# Patient Record
Sex: MALE | Race: WHITE | NOT HISPANIC OR LATINO | Employment: OTHER | URBAN - METROPOLITAN AREA
[De-identification: names, ages, dates, MRNs, and addresses within clinical notes are randomized per-mention and may not be internally consistent; named-entity substitution may affect disease eponyms.]

---

## 2017-12-02 ENCOUNTER — HISTORICAL (OUTPATIENT)
Dept: ADMINISTRATIVE | Facility: HOSPITAL | Age: 72
End: 2017-12-02

## 2017-12-13 ENCOUNTER — HISTORICAL (OUTPATIENT)
Dept: ADMINISTRATIVE | Facility: HOSPITAL | Age: 72
End: 2017-12-13

## 2018-03-21 ENCOUNTER — HISTORICAL (OUTPATIENT)
Dept: ADMINISTRATIVE | Facility: HOSPITAL | Age: 73
End: 2018-03-21

## 2018-04-28 ENCOUNTER — HISTORICAL (OUTPATIENT)
Dept: ADMINISTRATIVE | Facility: HOSPITAL | Age: 73
End: 2018-04-28

## 2021-01-01 ENCOUNTER — HISTORICAL (OUTPATIENT)
Dept: ADMINISTRATIVE | Facility: HOSPITAL | Age: 76
End: 2021-01-01

## 2021-10-25 ENCOUNTER — HISTORICAL (OUTPATIENT)
Dept: ADMINISTRATIVE | Facility: HOSPITAL | Age: 76
End: 2021-10-25

## 2022-01-01 ENCOUNTER — OFFICE VISIT (OUTPATIENT)
Dept: NEUROLOGY | Facility: CLINIC | Age: 77
End: 2022-01-01
Payer: MEDICARE

## 2022-01-01 ENCOUNTER — ANESTHESIA EVENT (OUTPATIENT)
Dept: ENDOSCOPY | Facility: HOSPITAL | Age: 77
DRG: 551 | End: 2022-01-01
Payer: MEDICARE

## 2022-01-01 ENCOUNTER — TELEPHONE (OUTPATIENT)
Dept: NEUROLOGY | Facility: CLINIC | Age: 77
End: 2022-01-01
Payer: MEDICARE

## 2022-01-01 ENCOUNTER — ANESTHESIA (OUTPATIENT)
Dept: ENDOSCOPY | Facility: HOSPITAL | Age: 77
DRG: 551 | End: 2022-01-01
Payer: MEDICARE

## 2022-01-01 ENCOUNTER — HOSPITAL ENCOUNTER (INPATIENT)
Facility: HOSPITAL | Age: 77
LOS: 11 days | DRG: 551 | End: 2022-11-18
Attending: EMERGENCY MEDICINE | Admitting: INTERNAL MEDICINE
Payer: MEDICARE

## 2022-01-01 ENCOUNTER — EXTERNAL HOME HEALTH (OUTPATIENT)
Dept: HOME HEALTH SERVICES | Facility: HOSPITAL | Age: 77
End: 2022-01-01
Payer: MEDICARE

## 2022-01-01 VITALS
HEIGHT: 65 IN | SYSTOLIC BLOOD PRESSURE: 168 MMHG | DIASTOLIC BLOOD PRESSURE: 87 MMHG | BODY MASS INDEX: 22.99 KG/M2 | WEIGHT: 138 LBS | HEART RATE: 66 BPM

## 2022-01-01 VITALS
WEIGHT: 150 LBS | OXYGEN SATURATION: 76 % | SYSTOLIC BLOOD PRESSURE: 129 MMHG | HEIGHT: 65 IN | BODY MASS INDEX: 24.99 KG/M2 | RESPIRATION RATE: 33 BRPM | TEMPERATURE: 99 F | DIASTOLIC BLOOD PRESSURE: 58 MMHG

## 2022-01-01 VITALS
HEIGHT: 66 IN | DIASTOLIC BLOOD PRESSURE: 80 MMHG | SYSTOLIC BLOOD PRESSURE: 140 MMHG | WEIGHT: 142 LBS | BODY MASS INDEX: 22.82 KG/M2

## 2022-01-01 VITALS
WEIGHT: 142 LBS | DIASTOLIC BLOOD PRESSURE: 82 MMHG | BODY MASS INDEX: 22.82 KG/M2 | SYSTOLIC BLOOD PRESSURE: 140 MMHG | HEIGHT: 66 IN

## 2022-01-01 DIAGNOSIS — R42 DIZZINESS: ICD-10-CM

## 2022-01-01 DIAGNOSIS — R29.818 ROMBERG'S TEST POSITIVE: ICD-10-CM

## 2022-01-01 DIAGNOSIS — I82.409 DVT (DEEP VENOUS THROMBOSIS): ICD-10-CM

## 2022-01-01 DIAGNOSIS — T17.908A ASPIRATION INTO AIRWAY, INITIAL ENCOUNTER: ICD-10-CM

## 2022-01-01 DIAGNOSIS — R53.1 GENERALIZED WEAKNESS: ICD-10-CM

## 2022-01-01 DIAGNOSIS — R53.81 PHYSICAL DECONDITIONING: ICD-10-CM

## 2022-01-01 DIAGNOSIS — R63.4 UNINTENTIONAL WEIGHT LOSS: ICD-10-CM

## 2022-01-01 DIAGNOSIS — R29.6 REPEATED FALLS: ICD-10-CM

## 2022-01-01 DIAGNOSIS — G40.019 INTRACTABLE LOCALIZATION-RELATED EPILEPSY: Primary | ICD-10-CM

## 2022-01-01 DIAGNOSIS — M62.81 MUSCLE WEAKNESS: ICD-10-CM

## 2022-01-01 DIAGNOSIS — R29.818 POSITIVE ROMBERG TEST: ICD-10-CM

## 2022-01-01 DIAGNOSIS — R29.6 FALLS FREQUENTLY: ICD-10-CM

## 2022-01-01 DIAGNOSIS — G40.019 INTRACTABLE LOCALIZATION-RELATED EPILEPSY: Primary | Chronic | ICD-10-CM

## 2022-01-01 DIAGNOSIS — R11.10 VOMITING, UNSPECIFIED VOMITING TYPE, UNSPECIFIED WHETHER NAUSEA PRESENT: ICD-10-CM

## 2022-01-01 DIAGNOSIS — R78.81 BACTEREMIA: ICD-10-CM

## 2022-01-01 DIAGNOSIS — R26.89 IMBALANCE: ICD-10-CM

## 2022-01-01 DIAGNOSIS — H53.2 VERTICAL DIPLOPIA: ICD-10-CM

## 2022-01-01 DIAGNOSIS — R56.9 SEIZURE: Primary | ICD-10-CM

## 2022-01-01 DIAGNOSIS — R47.01 APHASIA: Primary | ICD-10-CM

## 2022-01-01 DIAGNOSIS — Z51.81 THERAPEUTIC DRUG MONITORING: ICD-10-CM

## 2022-01-01 DIAGNOSIS — G40.109 LOCALIZATION-RELATED EPILEPSY: Primary | ICD-10-CM

## 2022-01-01 DIAGNOSIS — S12.300A: ICD-10-CM

## 2022-01-01 DIAGNOSIS — E87.6 HYPOKALEMIA: ICD-10-CM

## 2022-01-01 DIAGNOSIS — N17.9 AKI (ACUTE KIDNEY INJURY): Primary | ICD-10-CM

## 2022-01-01 DIAGNOSIS — R79.89 ELEVATED TROPONIN: ICD-10-CM

## 2022-01-01 DIAGNOSIS — R29.6 FREQUENT FALLS: ICD-10-CM

## 2022-01-01 LAB
ABS NEUT (OLG): 17.58 X10(3)/MCL (ref 2.1–9.2)
ABS NEUT (OLG): 18.81 X10(3)/MCL (ref 2.1–9.2)
ABS NEUT (OLG): 19.14 X10(3)/MCL (ref 2.1–9.2)
ABS NEUT (OLG): 23 X10(3)/MCL (ref 2.1–9.2)
ALBUMIN SERPL-MCNC: 1.2 GM/DL (ref 3.4–4.8)
ALBUMIN SERPL-MCNC: 1.4 GM/DL (ref 3.4–4.8)
ALBUMIN SERPL-MCNC: 1.6 GM/DL (ref 3.4–4.8)
ALBUMIN SERPL-MCNC: 1.8 GM/DL (ref 3.4–4.8)
ALBUMIN SERPL-MCNC: 1.9 GM/DL (ref 3.4–4.8)
ALBUMIN SERPL-MCNC: 2.4 GM/DL (ref 3.4–4.8)
ALBUMIN SERPL-MCNC: 2.7 GM/DL (ref 3.4–4.8)
ALBUMIN SERPL-MCNC: 2.8 GM/DL (ref 3.4–4.8)
ALBUMIN SERPL-MCNC: 2.8 GM/DL (ref 3.4–4.8)
ALBUMIN/GLOB SERPL: 0.3 RATIO (ref 1.1–2)
ALBUMIN/GLOB SERPL: 0.4 RATIO (ref 1.1–2)
ALBUMIN/GLOB SERPL: 0.5 RATIO (ref 1.1–2)
ALBUMIN/GLOB SERPL: 0.6 RATIO (ref 1.1–2)
ALBUMIN/GLOB SERPL: 0.6 RATIO (ref 1.1–2)
ALBUMIN/GLOB SERPL: 0.7 RATIO (ref 1.1–2)
ALBUMIN/GLOB SERPL: 0.9 RATIO (ref 1.1–2)
ALBUMIN/GLOB SERPL: 1 RATIO (ref 1.1–2)
ALBUMIN/GLOB SERPL: 1 RATIO (ref 1.1–2)
ALP SERPL-CCNC: 101 UNIT/L (ref 40–150)
ALP SERPL-CCNC: 65 UNIT/L (ref 40–150)
ALP SERPL-CCNC: 67 UNIT/L (ref 40–150)
ALP SERPL-CCNC: 67 UNIT/L (ref 40–150)
ALP SERPL-CCNC: 80 UNIT/L (ref 40–150)
ALP SERPL-CCNC: 88 UNIT/L (ref 40–150)
ALP SERPL-CCNC: 91 UNIT/L (ref 40–150)
ALP SERPL-CCNC: 94 UNIT/L (ref 40–150)
ALP SERPL-CCNC: 99 UNIT/L (ref 40–150)
ALT SERPL-CCNC: 10 UNIT/L (ref 0–55)
ALT SERPL-CCNC: 14 UNIT/L (ref 0–55)
ALT SERPL-CCNC: 15 UNIT/L (ref 0–55)
ALT SERPL-CCNC: 15 UNIT/L (ref 0–55)
ALT SERPL-CCNC: 6 UNIT/L (ref 0–55)
ALT SERPL-CCNC: 9 UNIT/L (ref 0–55)
ALT SERPL-CCNC: <5 UNIT/L (ref 0–55)
ANA SER QL HEP2 SUBST: NORMAL
ANION GAP SERPL CALC-SCNC: 11 MEQ/L
ANION GAP SERPL CALC-SCNC: 16 MEQ/L
ANISOCYTOSIS BLD QL SMEAR: ABNORMAL
APPEARANCE UR: CLEAR
AST SERPL-CCNC: 131 UNIT/L (ref 5–34)
AST SERPL-CCNC: 23 UNIT/L (ref 5–34)
AST SERPL-CCNC: 23 UNIT/L (ref 5–34)
AST SERPL-CCNC: 28 UNIT/L (ref 5–34)
AST SERPL-CCNC: 30 UNIT/L (ref 5–34)
AST SERPL-CCNC: 74 UNIT/L (ref 5–34)
AST SERPL-CCNC: 76 UNIT/L (ref 5–34)
AST SERPL-CCNC: 85 UNIT/L (ref 5–34)
AST SERPL-CCNC: 86 UNIT/L (ref 5–34)
AV INDEX (PROSTH): 0.67
AV PEAK GRADIENT: 6 MMHG
AV VELOCITY RATIO: 0.58
B PARAP IS1001 DNA CT SPEC QN NAA+PROBE: NOT DETECTED
B PERT+PARAP PTXS1 CT SPEC QN NAA+PROBE: NOT DETECTED
BACTERIA #/AREA URNS AUTO: NORMAL /HPF
BACTERIA BLD CULT: ABNORMAL
BASOPHILS # BLD AUTO: 0.05 X10(3)/MCL (ref 0–0.2)
BASOPHILS # BLD AUTO: 0.06 X10(3)/MCL (ref 0–0.2)
BASOPHILS # BLD AUTO: 0.06 X10(3)/MCL (ref 0–0.2)
BASOPHILS # BLD AUTO: 0.07 X10(3)/MCL (ref 0–0.2)
BASOPHILS # BLD AUTO: 0.09 X10(3)/MCL (ref 0–0.2)
BASOPHILS NFR BLD AUTO: 0.5 %
BASOPHILS NFR BLD AUTO: 0.5 %
BASOPHILS NFR BLD AUTO: 0.7 %
BASOPHILS NFR BLD AUTO: 0.7 %
BASOPHILS NFR BLD AUTO: 0.8 %
BILIRUB UR QL STRIP.AUTO: NEGATIVE MG/DL
BILIRUBIN DIRECT+TOT PNL SERPL-MCNC: 0.8 MG/DL
BILIRUBIN DIRECT+TOT PNL SERPL-MCNC: 0.9 MG/DL
BILIRUBIN DIRECT+TOT PNL SERPL-MCNC: 1 MG/DL
BILIRUBIN DIRECT+TOT PNL SERPL-MCNC: 1.1 MG/DL
BILIRUBIN DIRECT+TOT PNL SERPL-MCNC: 1.3 MG/DL
BSA FOR ECHO PROCEDURE: 1.77 M2
BUN SERPL-MCNC: 11.3 MG/DL (ref 8.4–25.7)
BUN SERPL-MCNC: 21.9 MG/DL (ref 8.4–25.7)
BUN SERPL-MCNC: 23.9 MG/DL (ref 8.4–25.7)
BUN SERPL-MCNC: 26.5 MG/DL (ref 8.4–25.7)
BUN SERPL-MCNC: 62.7 MG/DL (ref 8.4–25.7)
BUN SERPL-MCNC: 68 MG/DL (ref 8.4–25.7)
BUN SERPL-MCNC: 75.7 MG/DL (ref 8.4–25.7)
BUN SERPL-MCNC: 83.1 MG/DL (ref 8.4–25.7)
BUN SERPL-MCNC: 87.1 MG/DL (ref 8.4–25.7)
BUN SERPL-MCNC: 94.3 MG/DL (ref 8.4–25.7)
BUN SERPL-MCNC: 96 MG/DL (ref 8.4–25.7)
BURR CELLS (OLG): ABNORMAL
C-ANCA TITR SER IF: NEGATIVE {TITER}
C3 SERPL-MCNC: 94 MG/DL (ref 80–173)
C4 SERPL-MCNC: 21 MG/DL (ref 13–46)
CALCIUM SERPL-MCNC: 5.9 MG/DL (ref 8.8–10)
CALCIUM SERPL-MCNC: 6.3 MG/DL (ref 8.8–10)
CALCIUM SERPL-MCNC: 6.4 MG/DL (ref 8.8–10)
CALCIUM SERPL-MCNC: 6.5 MG/DL (ref 8.8–10)
CALCIUM SERPL-MCNC: 6.6 MG/DL (ref 8.8–10)
CALCIUM SERPL-MCNC: 7.3 MG/DL (ref 8.8–10)
CALCIUM SERPL-MCNC: 7.7 MG/DL (ref 8.8–10)
CALCIUM SERPL-MCNC: 8.6 MG/DL (ref 8.8–10)
CHLAMYDIA SP IGG+IGM PNL TITR SER IF: NOT DETECTED
CHLORIDE SERPL-SCNC: 100 MMOL/L (ref 98–107)
CHLORIDE SERPL-SCNC: 101 MMOL/L (ref 98–107)
CHLORIDE SERPL-SCNC: 101 MMOL/L (ref 98–107)
CHLORIDE SERPL-SCNC: 103 MMOL/L (ref 98–107)
CHLORIDE SERPL-SCNC: 104 MMOL/L (ref 98–107)
CHLORIDE SERPL-SCNC: 104 MMOL/L (ref 98–107)
CHLORIDE SERPL-SCNC: 105 MMOL/L (ref 98–107)
CHLORIDE SERPL-SCNC: 96 MMOL/L (ref 98–107)
CHLORIDE SERPL-SCNC: 96 MMOL/L (ref 98–107)
CHLORIDE SERPL-SCNC: 98 MMOL/L (ref 98–107)
CHLORIDE SERPL-SCNC: 99 MMOL/L (ref 98–107)
CO2 SERPL-SCNC: 14 MMOL/L (ref 23–31)
CO2 SERPL-SCNC: 16 MMOL/L (ref 23–31)
CO2 SERPL-SCNC: 20 MMOL/L (ref 23–31)
CO2 SERPL-SCNC: 21 MMOL/L (ref 23–31)
CO2 SERPL-SCNC: 23 MMOL/L (ref 23–31)
CO2 SERPL-SCNC: 24 MMOL/L (ref 23–31)
CO2 SERPL-SCNC: 25 MMOL/L (ref 23–31)
CO2 SERPL-SCNC: 27 MMOL/L (ref 23–31)
CO2 SERPL-SCNC: 30 MMOL/L (ref 23–31)
COLOR UR AUTO: YELLOW
CORRECTED TEMPERATURE (PCO2): 37 MMHG (ref 35–45)
CORRECTED TEMPERATURE (PCO2): 54 MMHG (ref 19–50)
CORRECTED TEMPERATURE (PH): 7.25 (ref 7.29–7.61)
CORRECTED TEMPERATURE (PH): 7.32 (ref 7.35–7.45)
CORRECTED TEMPERATURE (PO2): 82 MMHG (ref 80–100)
CORRECTED TEMPERATURE (PO2): 98 MMHG (ref 80–100)
CREAT SERPL-MCNC: 1.32 MG/DL (ref 0.73–1.18)
CREAT SERPL-MCNC: 1.93 MG/DL (ref 0.73–1.18)
CREAT SERPL-MCNC: 2.24 MG/DL (ref 0.73–1.18)
CREAT SERPL-MCNC: 2.71 MG/DL (ref 0.73–1.18)
CREAT SERPL-MCNC: 3.49 MG/DL (ref 0.73–1.18)
CREAT SERPL-MCNC: 4.23 MG/DL (ref 0.73–1.18)
CREAT SERPL-MCNC: 4.26 MG/DL (ref 0.73–1.18)
CREAT SERPL-MCNC: 4.6 MG/DL (ref 0.73–1.18)
CREAT SERPL-MCNC: 5.06 MG/DL (ref 0.73–1.18)
CREAT SERPL-MCNC: 5.21 MG/DL (ref 0.73–1.18)
CREAT SERPL-MCNC: 5.22 MG/DL (ref 0.73–1.18)
CREAT UR-MCNC: 40 MG/DL (ref 63–166)
CREAT/UREA NIT SERPL: 18
CREAT/UREA NIT SERPL: 18
CV ECHO LV RWT: 0.67 CM
DOP CALC AO PEAK VEL: 1.2 M/S
DOP CALC AO VTI: 27.9 CM
DOP CALC LVOT PEAK VEL: 0.7 M/S
DOP CALCLVOT PEAK VEL VTI: 18.7 CM
E/A RATIO: 0.83
ECHO LV POSTERIOR WALL: 1.2 CM (ref 0.6–1.1)
EJECTION FRACTION: 55 %
ELIA CELIKEY IGA (TTG IGA): NEGATIVE
EOSINOPHIL # BLD AUTO: 0.01 X10(3)/MCL (ref 0–0.9)
EOSINOPHIL # BLD AUTO: 0.06 X10(3)/MCL (ref 0–0.9)
EOSINOPHIL # BLD AUTO: 0.13 X10(3)/MCL (ref 0–0.9)
EOSINOPHIL # BLD AUTO: 0.19 X10(3)/MCL (ref 0–0.9)
EOSINOPHIL # BLD AUTO: 0.24 X10(3)/MCL (ref 0–0.9)
EOSINOPHIL NFR BLD AUTO: 0.1 %
EOSINOPHIL NFR BLD AUTO: 0.4 %
EOSINOPHIL NFR BLD AUTO: 1.4 %
EOSINOPHIL NFR BLD AUTO: 2.1 %
EOSINOPHIL NFR BLD AUTO: 2.9 %
ERYTHROCYTE [DISTWIDTH] IN BLOOD BY AUTOMATED COUNT: 12.6 % (ref 11.5–17)
ERYTHROCYTE [DISTWIDTH] IN BLOOD BY AUTOMATED COUNT: 13 % (ref 11.5–17)
ERYTHROCYTE [DISTWIDTH] IN BLOOD BY AUTOMATED COUNT: 13.2 % (ref 11.5–17)
ERYTHROCYTE [DISTWIDTH] IN BLOOD BY AUTOMATED COUNT: 13.2 % (ref 11.5–17)
ERYTHROCYTE [DISTWIDTH] IN BLOOD BY AUTOMATED COUNT: 13.4 % (ref 11.5–17)
ERYTHROCYTE [DISTWIDTH] IN BLOOD BY AUTOMATED COUNT: 13.5 % (ref 11.5–17)
ERYTHROCYTE [DISTWIDTH] IN BLOOD BY AUTOMATED COUNT: 13.5 % (ref 11.5–17)
ERYTHROCYTE [DISTWIDTH] IN BLOOD BY AUTOMATED COUNT: 14 % (ref 11.5–17)
ERYTHROCYTE [DISTWIDTH] IN BLOOD BY AUTOMATED COUNT: 14.6 % (ref 11.5–17)
ESTROGEN SERPL-MCNC: NORMAL PG/ML
FLUAV AG UPPER RESP QL IA.RAPID: NOT DETECTED
FLUAV AG UPPER RESP QL IA.RAPID: NOT DETECTED
FLUAV H1 2009 RNA SPEC NAA+PROBE-IMP: NORMAL
FLUAV H3 HA GENE NPH QL NAA+PROBE: NORMAL
FLUBV AG UPPER RESP QL IA.RAPID: NOT DETECTED
FLUBV AG UPPER RESP QL IA.RAPID: NOT DETECTED
FRACTIONAL SHORTENING: 67 % (ref 28–44)
GFR SERPLBLD CREATININE-BSD FMLA CKD-EPI: 11 MLS/MIN/1.73/M2
GFR SERPLBLD CREATININE-BSD FMLA CKD-EPI: 12 MLS/MIN/1.73/M2
GFR SERPLBLD CREATININE-BSD FMLA CKD-EPI: 14 MLS/MIN/1.73/M2
GFR SERPLBLD CREATININE-BSD FMLA CKD-EPI: 14 MLS/MIN/1.73/M2
GFR SERPLBLD CREATININE-BSD FMLA CKD-EPI: 17 MLS/MIN/1.73/M2
GFR SERPLBLD CREATININE-BSD FMLA CKD-EPI: 24 MLS/MIN/1.73/M2
GFR SERPLBLD CREATININE-BSD FMLA CKD-EPI: 30 MLS/MIN/1.73/M2
GFR SERPLBLD CREATININE-BSD FMLA CKD-EPI: 35 MLS/MIN/1.73/M2
GFR SERPLBLD CREATININE-BSD FMLA CKD-EPI: 56 MLS/MIN/1.73/M2
GIANT PLATELETS: ABNORMAL
GLOBULIN SER-MCNC: 2.7 GM/DL (ref 2.4–3.5)
GLOBULIN SER-MCNC: 2.7 GM/DL (ref 2.4–3.5)
GLOBULIN SER-MCNC: 3.1 GM/DL (ref 2.4–3.5)
GLOBULIN SER-MCNC: 3.1 GM/DL (ref 2.4–3.5)
GLOBULIN SER-MCNC: 3.2 GM/DL (ref 2.4–3.5)
GLOBULIN SER-MCNC: 3.3 GM/DL (ref 2.4–3.5)
GLOBULIN SER-MCNC: 3.4 GM/DL (ref 2.4–3.5)
GLOBULIN SER-MCNC: 3.6 GM/DL (ref 2.4–3.5)
GLOBULIN SER-MCNC: 3.8 GM/DL (ref 2.4–3.5)
GLUCOSE SERPL-MCNC: 110 MG/DL (ref 82–115)
GLUCOSE SERPL-MCNC: 111 MG/DL (ref 82–115)
GLUCOSE SERPL-MCNC: 120 MG/DL (ref 82–115)
GLUCOSE SERPL-MCNC: 157 MG/DL (ref 82–115)
GLUCOSE SERPL-MCNC: 180 MG/DL (ref 82–115)
GLUCOSE SERPL-MCNC: 202 MG/DL (ref 82–115)
GLUCOSE SERPL-MCNC: 211 MG/DL (ref 82–115)
GLUCOSE SERPL-MCNC: 253 MG/DL (ref 82–115)
GLUCOSE SERPL-MCNC: 439 MG/DL (ref 82–115)
GLUCOSE SERPL-MCNC: 68 MG/DL (ref 82–115)
GLUCOSE SERPL-MCNC: 90 MG/DL (ref 82–115)
GLUCOSE UR QL STRIP.AUTO: ABNORMAL MG/DL
GLUCOSE UR QL STRIP.AUTO: NEGATIVE MG/DL
GLUCOSE UR QL STRIP.AUTO: NEGATIVE MG/DL
GRAM STN SPEC: ABNORMAL
HADV DNA NPH QL NAA+NON-PROBE: NOT DETECTED
HBV CORE AB SERPL QL IA: NONREACTIVE
HBV CORE IGM SERPL QL IA: NONREACTIVE
HBV SURFACE AB SER-ACNC: 0 MIU/ML
HBV SURFACE AB SERPL IA-ACNC: NONREACTIVE M[IU]/ML
HBV SURFACE AG SERPL QL IA: NONREACTIVE
HCO3 UR-SCNC: 16.2 MMOL/L (ref 22–26)
HCO3 UR-SCNC: 27.8 MMOL/L (ref 22–26)
HCOV 229E+OC43 RNA NPH QL NAA+PROBE: NOT DETECTED
HCOV HKU1 RNA SPEC QL NAA+PROBE: NOT DETECTED
HCOV NL63 RNA SPEC QL NAA+PROBE: NOT DETECTED
HCOV OC43 RNA SPEC QL NAA+PROBE: NOT DETECTED
HCT VFR BLD AUTO: 22.8 % (ref 42–52)
HCT VFR BLD AUTO: 23.6 % (ref 42–52)
HCT VFR BLD AUTO: 25.6 % (ref 42–52)
HCT VFR BLD AUTO: 32.8 % (ref 42–52)
HCT VFR BLD AUTO: 33.4 % (ref 42–52)
HCT VFR BLD AUTO: 34.6 % (ref 42–52)
HCT VFR BLD AUTO: 34.8 % (ref 42–52)
HCT VFR BLD AUTO: 36.2 % (ref 42–52)
HCT VFR BLD AUTO: 37.4 % (ref 42–52)
HCV AB SERPL QL IA: NONREACTIVE
HGB BLD-MCNC: 10.4 GM/DL (ref 14–18)
HGB BLD-MCNC: 10.8 G/DL (ref 12–16)
HGB BLD-MCNC: 11 GM/DL (ref 14–18)
HGB BLD-MCNC: 11.2 GM/DL (ref 14–18)
HGB BLD-MCNC: 11.3 GM/DL (ref 14–18)
HGB BLD-MCNC: 11.8 GM/DL (ref 14–18)
HGB BLD-MCNC: 12.2 GM/DL (ref 14–18)
HGB BLD-MCNC: 7.4 GM/DL (ref 14–18)
HGB BLD-MCNC: 7.8 GM/DL (ref 14–18)
HGB BLD-MCNC: 7.9 G/DL (ref 12–16)
HGB BLD-MCNC: 8.4 GM/DL (ref 14–18)
HIV 1+2 AB+HIV1 P24 AG SERPL QL IA: NONREACTIVE
HMPV RNA SPEC QL NAA+PROBE: NOT DETECTED
HPIV1 F GENE NPH QL NAA+PROBE: NOT DETECTED
HPIV2 L GENE NPH QL NAA+PROBE: NOT DETECTED
HPIV3 NP GENE NPH QL NAA+PROBE: NOT DETECTED
HPIV4 P GENE NPH QL NAA+PROBE: NOT DETECTED
IMM GRANULOCYTES # BLD AUTO: 0.03 X10(3)/MCL (ref 0–0.04)
IMM GRANULOCYTES # BLD AUTO: 0.03 X10(3)/MCL (ref 0–0.04)
IMM GRANULOCYTES # BLD AUTO: 0.04 X10(3)/MCL (ref 0–0.04)
IMM GRANULOCYTES # BLD AUTO: 0.09 X10(3)/MCL (ref 0–0.04)
IMM GRANULOCYTES # BLD AUTO: 0.14 X10(3)/MCL (ref 0–0.04)
IMM GRANULOCYTES # BLD AUTO: 0.16 X10(3)/MCL (ref 0–0.04)
IMM GRANULOCYTES # BLD AUTO: 0.23 X10(3)/MCL (ref 0–0.04)
IMM GRANULOCYTES # BLD AUTO: 0.97 X10(3)/MCL (ref 0–0.04)
IMM GRANULOCYTES # BLD AUTO: 1.4 X10(3)/MCL (ref 0–0.04)
IMM GRANULOCYTES NFR BLD AUTO: 0.3 %
IMM GRANULOCYTES NFR BLD AUTO: 0.4 %
IMM GRANULOCYTES NFR BLD AUTO: 0.5 %
IMM GRANULOCYTES NFR BLD AUTO: 0.8 %
IMM GRANULOCYTES NFR BLD AUTO: 0.8 %
IMM GRANULOCYTES NFR BLD AUTO: 0.9 %
IMM GRANULOCYTES NFR BLD AUTO: 1.2 %
IMM GRANULOCYTES NFR BLD AUTO: 4.5 %
IMM GRANULOCYTES NFR BLD AUTO: 5.6 %
INSTRUMENT WBC (OLG): 18.9 X10(3)/MCL
INSTRUMENT WBC (OLG): 20.9 X10(3)/MCL
INSTRUMENT WBC (OLG): 21.5 X10(3)/MCL
INSTRUMENT WBC (OLG): 25 X10(3)/MCL
INSULIN SERPL-ACNC: NORMAL U[IU]/ML
INTERVENTRICULAR SEPTUM: 1.4 CM (ref 0.6–1.1)
KETONES UR QL STRIP.AUTO: ABNORMAL MG/DL
LAB AP CLINICAL INFORMATION: NORMAL
LAB AP GROSS DESCRIPTION: NORMAL
LAB AP REPORT FOOTNOTES: NORMAL
LACOSAMIDE SERPL-MCNC: 10.9 MCG/ML (ref 1–10)
LACTATE SERPL-SCNC: 1.9 MMOL/L (ref 0.5–2.2)
LAMOTRIGINE SERPL-MCNC: 11 MCG/ML (ref 3–15)
LEFT ATRIUM SIZE: 2.6 CM
LEFT INTERNAL DIMENSION IN SYSTOLE: 1.2 CM (ref 2.1–4)
LEFT VENTRICLE MASS INDEX: 91 G/M2
LEFT VENTRICULAR INTERNAL DIMENSION IN DIASTOLE: 3.6 CM (ref 3.5–6)
LEFT VENTRICULAR MASS: 160.07 G
LEUKOCYTE ESTERASE UR QL STRIP.AUTO: NEGATIVE UNIT/L
LYMPHOCYTES # BLD AUTO: 0.59 X10(3)/MCL (ref 0.6–4.6)
LYMPHOCYTES # BLD AUTO: 1.11 X10(3)/MCL (ref 0.6–4.6)
LYMPHOCYTES # BLD AUTO: 1.48 X10(3)/MCL (ref 0.6–4.6)
LYMPHOCYTES # BLD AUTO: 1.89 X10(3)/MCL (ref 0.6–4.6)
LYMPHOCYTES # BLD AUTO: 2.08 X10(3)/MCL (ref 0.6–4.6)
LYMPHOCYTES NFR BLD AUTO: 17.9 %
LYMPHOCYTES NFR BLD AUTO: 21.4 %
LYMPHOCYTES NFR BLD AUTO: 22.7 %
LYMPHOCYTES NFR BLD AUTO: 5.7 %
LYMPHOCYTES NFR BLD AUTO: 6.5 %
LYMPHOCYTES NFR BLD MANUAL: 0.75 X10(3)/MCL
LYMPHOCYTES NFR BLD MANUAL: 0.76 X10(3)/MCL
LYMPHOCYTES NFR BLD MANUAL: 1.25 X10(3)/MCL
LYMPHOCYTES NFR BLD MANUAL: 1.72 X10(3)/MCL
LYMPHOCYTES NFR BLD MANUAL: 3 %
LYMPHOCYTES NFR BLD MANUAL: 4 %
LYMPHOCYTES NFR BLD MANUAL: 6 %
LYMPHOCYTES NFR BLD MANUAL: 8 %
M PNEUMO IGA SER-ACNC: NOT DETECTED
MAGNESIUM SERPL-MCNC: 1.9 MG/DL (ref 1.6–2.6)
MAGNESIUM SERPL-MCNC: 2 MG/DL (ref 1.6–2.6)
MAGNESIUM SERPL-MCNC: 2 MG/DL (ref 1.6–2.6)
MAGNESIUM SERPL-MCNC: 2.1 MG/DL (ref 1.6–2.6)
MAGNESIUM SERPL-MCNC: 2.2 MG/DL (ref 1.6–2.6)
MCH RBC QN AUTO: 27.6 PG (ref 27–31)
MCH RBC QN AUTO: 27.8 PG (ref 27–31)
MCH RBC QN AUTO: 27.8 PG (ref 27–31)
MCH RBC QN AUTO: 28.1 PG (ref 27–31)
MCH RBC QN AUTO: 28.1 PG (ref 27–31)
MCH RBC QN AUTO: 28.2 PG (ref 27–31)
MCH RBC QN AUTO: 28.3 PG (ref 27–31)
MCH RBC QN AUTO: 28.4 PG (ref 27–31)
MCH RBC QN AUTO: 28.6 PG (ref 27–31)
MCHC RBC AUTO-ENTMCNC: 31.6 MG/DL (ref 33–36)
MCHC RBC AUTO-ENTMCNC: 31.7 MG/DL (ref 33–36)
MCHC RBC AUTO-ENTMCNC: 32.5 MG/DL (ref 33–36)
MCHC RBC AUTO-ENTMCNC: 32.6 MG/DL (ref 33–36)
MCHC RBC AUTO-ENTMCNC: 32.6 MG/DL (ref 33–36)
MCHC RBC AUTO-ENTMCNC: 32.7 MG/DL (ref 33–36)
MCHC RBC AUTO-ENTMCNC: 32.8 MG/DL (ref 33–36)
MCHC RBC AUTO-ENTMCNC: 33.1 MG/DL (ref 33–36)
MCHC RBC AUTO-ENTMCNC: 33.5 MG/DL (ref 33–36)
MCV RBC AUTO: 84.9 FL (ref 80–94)
MCV RBC AUTO: 85.4 FL (ref 80–94)
MCV RBC AUTO: 85.7 FL (ref 80–94)
MCV RBC AUTO: 86.2 FL (ref 80–94)
MCV RBC AUTO: 86.4 FL (ref 80–94)
MCV RBC AUTO: 86.5 FL (ref 80–94)
MCV RBC AUTO: 86.7 FL (ref 80–94)
MCV RBC AUTO: 87 FL (ref 80–94)
MCV RBC AUTO: 88.1 FL (ref 80–94)
METAMYELOCYTES NFR BLD MANUAL: 2 %
MICROCYTES BLD QL SMEAR: ABNORMAL
MONOCYTES # BLD AUTO: 0.76 X10(3)/MCL (ref 0.1–1.3)
MONOCYTES # BLD AUTO: 0.89 X10(3)/MCL (ref 0.1–1.3)
MONOCYTES # BLD AUTO: 0.9 X10(3)/MCL (ref 0.1–1.3)
MONOCYTES # BLD AUTO: 1 X10(3)/MCL (ref 0.1–1.3)
MONOCYTES # BLD AUTO: 1.21 X10(3)/MCL (ref 0.1–1.3)
MONOCYTES NFR BLD AUTO: 10.1 %
MONOCYTES NFR BLD AUTO: 10.9 %
MONOCYTES NFR BLD AUTO: 10.9 %
MONOCYTES NFR BLD AUTO: 7.1 %
MONOCYTES NFR BLD AUTO: 7.4 %
MONOCYTES NFR BLD MANUAL: 0.57 X10(3)/MCL (ref 0.1–1.3)
MONOCYTES NFR BLD MANUAL: 0.65 X10(3)/MCL (ref 0.1–1.3)
MONOCYTES NFR BLD MANUAL: 0.84 X10(3)/MCL (ref 0.1–1.3)
MONOCYTES NFR BLD MANUAL: 1.25 X10(3)/MCL (ref 0.1–1.3)
MONOCYTES NFR BLD MANUAL: 3 %
MONOCYTES NFR BLD MANUAL: 3 %
MONOCYTES NFR BLD MANUAL: 4 %
MONOCYTES NFR BLD MANUAL: 5 %
MV PEAK A VEL: 0.6 M/S
MV PEAK E VEL: 0.5 M/S
NEUTROPHILS # BLD AUTO: 14.5 X10(3)/MCL (ref 2.1–9.2)
NEUTROPHILS # BLD AUTO: 5.5 X10(3)/MCL (ref 2.1–9.2)
NEUTROPHILS # BLD AUTO: 5.8 X10(3)/MCL (ref 2.1–9.2)
NEUTROPHILS # BLD AUTO: 5.9 X10(3)/MCL (ref 2.1–9.2)
NEUTROPHILS # BLD AUTO: 8.8 X10(3)/MCL (ref 2.1–9.2)
NEUTROPHILS NFR BLD AUTO: 64 %
NEUTROPHILS NFR BLD AUTO: 65.1 %
NEUTROPHILS NFR BLD AUTO: 67.2 %
NEUTROPHILS NFR BLD AUTO: 84.7 %
NEUTROPHILS NFR BLD AUTO: 85.4 %
NEUTROPHILS NFR BLD MANUAL: 89 %
NEUTROPHILS NFR BLD MANUAL: 90 %
NEUTROPHILS NFR BLD MANUAL: 90 %
NEUTROPHILS NFR BLD MANUAL: 93 %
NITRITE UR QL STRIP.AUTO: NEGATIVE
NRBC BLD AUTO-RTO: 0 %
NRBC BLD AUTO-RTO: 0.1 %
NRBC BLD AUTO-RTO: 0.1 %
P-ANCA SER QL IF: NEGATIVE
PCO2 BLDA: 37 MMHG (ref 35–45)
PCO2 BLDA: 43 MMHG
PCO2 BLDA: 49 MMHG
PCO2 BLDA: 54 MMHG (ref 19–50)
PH SMN: 7.25 [PH] (ref 7.29–7.61)
PH SMN: 7.32 [PH] (ref 7.35–7.45)
PH SMN: 7.35 [PH]
PH SMN: 7.35 [PH]
PH UR STRIP.AUTO: 5 [PH]
PH UR STRIP.AUTO: 6.5 [PH]
PH UR STRIP.AUTO: 6.5 [PH]
PHOSPHATE SERPL-MCNC: 3 MG/DL (ref 2.3–4.7)
PHOSPHATE SERPL-MCNC: 5.4 MG/DL (ref 2.3–4.7)
PHOSPHATE SERPL-MCNC: 6.3 MG/DL (ref 2.3–4.7)
PLATELET # BLD AUTO: 253 X10(3)/MCL (ref 130–400)
PLATELET # BLD AUTO: 254 X10(3)/MCL (ref 130–400)
PLATELET # BLD AUTO: 258 X10(3)/MCL (ref 130–400)
PLATELET # BLD AUTO: 258 X10(3)/MCL (ref 130–400)
PLATELET # BLD AUTO: 259 X10(3)/MCL (ref 130–400)
PLATELET # BLD AUTO: 279 X10(3)/MCL (ref 130–400)
PLATELET # BLD AUTO: 288 X10(3)/MCL (ref 130–400)
PLATELET # BLD AUTO: 294 X10(3)/MCL (ref 130–400)
PLATELET # BLD AUTO: 312 X10(3)/MCL (ref 130–400)
PLATELET # BLD EST: ADEQUATE 10*3/UL
PLATELET # BLD EST: ADEQUATE 10*3/UL
PLATELET # BLD EST: NORMAL 10*3/UL
PLATELET # BLD EST: NORMAL 10*3/UL
PMV BLD AUTO: 8.5 FL (ref 7.4–10.4)
PMV BLD AUTO: 9 FL (ref 7.4–10.4)
PMV BLD AUTO: 9 FL (ref 7.4–10.4)
PMV BLD AUTO: 9.2 FL (ref 7.4–10.4)
PMV BLD AUTO: 9.3 FL (ref 7.4–10.4)
PMV BLD AUTO: 9.4 FL (ref 7.4–10.4)
PMV BLD AUTO: 9.4 FL (ref 7.4–10.4)
PMV BLD AUTO: 9.5 FL (ref 7.4–10.4)
PMV BLD AUTO: 9.6 FL (ref 7.4–10.4)
PO2 BLDA: 82 MMHG (ref 80–100)
PO2 BLDA: 92 MMHG
PO2 BLDA: 98 MMHG
PO2 BLDA: 98 MMHG (ref 80–100)
POC BASE DEFICIT: -10.2 MMOL/L (ref -2–2)
POC BASE DEFICIT: -2 MMOL/L
POC BASE DEFICIT: 0.8 MMOL/L
POC BASE DEFICIT: 1 MMOL/L (ref -2–2)
POC COHB: 1.7 %
POC COHB: 1.8 %
POC HCO3: 23.7 MMOL/L
POC HCO3: 27.1 MMOL/L
POC IONIZED CALCIUM: 0.77 MMOL/L (ref 0.79–1.4)
POC IONIZED CALCIUM: 0.87 MMOL/L (ref 1.1–1.2)
POC IONIZED CALCIUM: 0.89 MMOL/L (ref 1.1–1.2)
POC IONIZED CALCIUM: 0.97 MMOL/L (ref 1.1–1.2)
POC METHB: 1.2 % (ref 0.4–1.5)
POC METHB: 1.3 % (ref 0.4–1.5)
POC O2HB: 94.7 % (ref 94–97)
POC O2HB: 95.6 % (ref 94–97)
POC SATURATED O2: 95 %
POC SATURATED O2: 96.4 %
POC SATURATED O2: 97 %
POC SATURATED O2: 97 %
POC TEMPERATURE: 37 C
POC TEMPERATURE: 37 C
POC TEMPERATURE: 37 °C
POC TEMPERATURE: 37 °C
POCT GLUCOSE: 162 MG/DL (ref 70–110)
POCT GLUCOSE: 428 MG/DL (ref 70–110)
POCT GLUCOSE: 76 MG/DL (ref 70–110)
POCT GLUCOSE: 81 MG/DL (ref 70–110)
POCT GLUCOSE: <20 MG/DL (ref 70–110)
POCT GLUCOSE: <20 MG/DL (ref 70–110)
POCT GLUCOSE: >500 MG/DL (ref 70–110)
POIKILOCYTOSIS BLD QL SMEAR: ABNORMAL
POTASSIUM BLD-SCNC: 4.3 MMOL/L (ref 3.5–5)
POTASSIUM BLD-SCNC: 4.5 MMOL/L
POTASSIUM BLD-SCNC: 4.6 MMOL/L
POTASSIUM BLD-SCNC: 5.8 MMOL/L (ref 3.5–5)
POTASSIUM SERPL-SCNC: 2.8 MMOL/L (ref 3.5–5.1)
POTASSIUM SERPL-SCNC: 3.3 MMOL/L (ref 3.5–5.1)
POTASSIUM SERPL-SCNC: 3.5 MMOL/L (ref 3.5–5.1)
POTASSIUM SERPL-SCNC: 3.8 MMOL/L (ref 3.5–5.1)
POTASSIUM SERPL-SCNC: 4.5 MMOL/L (ref 3.5–5.1)
POTASSIUM SERPL-SCNC: 4.7 MMOL/L (ref 3.5–5.1)
POTASSIUM SERPL-SCNC: 4.7 MMOL/L (ref 3.5–5.1)
POTASSIUM SERPL-SCNC: 4.8 MMOL/L (ref 3.5–5.1)
POTASSIUM SERPL-SCNC: 5.1 MMOL/L (ref 3.5–5.1)
POTASSIUM SERPL-SCNC: 5.5 MMOL/L (ref 3.5–5.1)
POTASSIUM SERPL-SCNC: 5.9 MMOL/L (ref 3.5–5.1)
PROT SERPL-MCNC: 4.9 GM/DL (ref 5.8–7.6)
PROT SERPL-MCNC: 4.9 GM/DL (ref 5.8–7.6)
PROT SERPL-MCNC: 5 GM/DL (ref 5.8–7.6)
PROT SERPL-MCNC: 5 GM/DL (ref 5.8–7.6)
PROT SERPL-MCNC: 5.1 GM/DL (ref 5.8–7.6)
PROT SERPL-MCNC: 5.4 GM/DL (ref 5.8–7.6)
PROT SERPL-MCNC: 5.5 GM/DL (ref 5.8–7.6)
PROT SERPL-MCNC: 5.8 GM/DL (ref 5.8–7.6)
PROT SERPL-MCNC: 5.9 GM/DL (ref 5.8–7.6)
PROT UR QL STRIP.AUTO: ABNORMAL MG/DL
PROT UR QL STRIP.AUTO: ABNORMAL MG/DL
PROT UR QL STRIP.AUTO: NEGATIVE MG/DL
PROT UR STRIP-MCNC: 56.4 MG/DL
PSA SERPL-MCNC: 0.81 NG/ML
RBC # BLD AUTO: 2.66 X10(6)/MCL (ref 4.7–6.1)
RBC # BLD AUTO: 2.78 X10(6)/MCL (ref 4.7–6.1)
RBC # BLD AUTO: 2.96 X10(6)/MCL (ref 4.7–6.1)
RBC # BLD AUTO: 3.77 X10(6)/MCL (ref 4.7–6.1)
RBC # BLD AUTO: 3.91 X10(6)/MCL (ref 4.7–6.1)
RBC # BLD AUTO: 3.95 X10(6)/MCL (ref 4.7–6.1)
RBC # BLD AUTO: 3.99 X10(6)/MCL (ref 4.7–6.1)
RBC # BLD AUTO: 4.19 X10(6)/MCL (ref 4.7–6.1)
RBC # BLD AUTO: 4.34 X10(6)/MCL (ref 4.7–6.1)
RBC #/AREA URNS AUTO: <5 /HPF
RBC MORPH BLD: ABNORMAL
RBC UR QL AUTO: ABNORMAL UNIT/L
RBC UR QL AUTO: ABNORMAL UNIT/L
RBC UR QL AUTO: NEGATIVE UNIT/L
RHINOVIRUS RNA SPEC NAA+PROBE: NOT DETECTED
RSV A 5' UTR RNA NPH QL NAA+PROBE: NOT DETECTED
SARS-COV-2 RNA RESP QL NAA+PROBE: NOT DETECTED
SODIUM BLD-SCNC: 128 MMOL/L (ref 137–145)
SODIUM BLD-SCNC: 129 MMOL/L (ref 137–145)
SODIUM BLD-SCNC: 130 MMOL/L (ref 137–145)
SODIUM BLD-SCNC: 130 MMOL/L (ref 137–145)
SODIUM SERPL-SCNC: 131 MMOL/L (ref 136–145)
SODIUM SERPL-SCNC: 132 MMOL/L (ref 136–145)
SODIUM SERPL-SCNC: 133 MMOL/L (ref 136–145)
SODIUM SERPL-SCNC: 134 MMOL/L (ref 136–145)
SODIUM SERPL-SCNC: 135 MMOL/L (ref 136–145)
SODIUM SERPL-SCNC: 137 MMOL/L (ref 136–145)
SODIUM SERPL-SCNC: 138 MMOL/L (ref 136–145)
SODIUM SERPL-SCNC: 144 MMOL/L (ref 136–145)
SODIUM SERPL-SCNC: 144 MMOL/L (ref 136–145)
SODIUM UR-SCNC: 70 MMOL/L
SP GR UR STRIP.AUTO: 1.01 (ref 1–1.03)
SPECIMEN SOURCE: ABNORMAL
SQUAMOUS #/AREA URNS AUTO: <5 /HPF
T PALLIDUM AB SER QL: NONREACTIVE
T3RU NFR SERPL: NORMAL %
TARGETS BLD QL SMEAR: ABNORMAL
TARGETS BLD QL SMEAR: ABNORMAL
TROPONIN I SERPL-MCNC: 2.58 NG/ML (ref 0–0.04)
TROPONIN I SERPL-MCNC: 3.92 NG/ML (ref 0–0.04)
TROPONIN I SERPL-MCNC: 5.38 NG/ML (ref 0–0.04)
TROPONIN I SERPL-MCNC: 5.39 NG/ML (ref 0–0.04)
TROPONIN I SERPL-MCNC: <0.01 NG/ML (ref 0–0.04)
URINE PROTEIN/CREATININE RATIO (OHS): 1.4
UROBILINOGEN UR STRIP-ACNC: 1 MG/DL
VANCOMYCIN SERPL-MCNC: 13.9 UG/ML (ref 15–20)
WBC # SPEC AUTO: 10.3 X10(3)/MCL (ref 4.5–11.5)
WBC # SPEC AUTO: 17.1 X10(3)/MCL (ref 4.5–11.5)
WBC # SPEC AUTO: 18.9 X10(3)/MCL (ref 4.5–11.5)
WBC # SPEC AUTO: 20.9 X10(3)/MCL (ref 4.5–11.5)
WBC # SPEC AUTO: 21.5 X10(3)/MCL (ref 4.5–11.5)
WBC # SPEC AUTO: 25 X10(3)/MCL (ref 4.5–11.5)
WBC # SPEC AUTO: 8.3 X10(3)/MCL (ref 4.5–11.5)
WBC # SPEC AUTO: 8.8 X10(3)/MCL (ref 4.5–11.5)
WBC # SPEC AUTO: 9.2 X10(3)/MCL (ref 4.5–11.5)
WBC #/AREA URNS AUTO: <5 /HPF

## 2022-01-01 PROCEDURE — 1101F PR PT FALLS ASSESS DOC 0-1 FALLS W/OUT INJ PAST YR: ICD-10-PCS | Mod: CPTII,S$GLB,, | Performed by: PSYCHIATRY & NEUROLOGY

## 2022-01-01 PROCEDURE — 97530 THERAPEUTIC ACTIVITIES: CPT | Mod: CQ

## 2022-01-01 PROCEDURE — 3079F DIAST BP 80-89 MM HG: CPT | Mod: CPTII,S$GLB,, | Performed by: PSYCHIATRY & NEUROLOGY

## 2022-01-01 PROCEDURE — 99232 SBSQ HOSP IP/OBS MODERATE 35: CPT | Mod: ,,, | Performed by: NEUROLOGICAL SURGERY

## 2022-01-01 PROCEDURE — 84153 ASSAY OF PSA TOTAL: CPT | Performed by: INTERNAL MEDICINE

## 2022-01-01 PROCEDURE — 1159F MED LIST DOCD IN RCRD: CPT | Mod: CPTII,S$GLB,, | Performed by: NURSE PRACTITIONER

## 2022-01-01 PROCEDURE — 25000003 PHARM REV CODE 250

## 2022-01-01 PROCEDURE — 63600175 PHARM REV CODE 636 W HCPCS: Performed by: NURSE PRACTITIONER

## 2022-01-01 PROCEDURE — 99900035 HC TECH TIME PER 15 MIN (STAT)

## 2022-01-01 PROCEDURE — 94640 AIRWAY INHALATION TREATMENT: CPT

## 2022-01-01 PROCEDURE — 80053 COMPREHEN METABOLIC PANEL: CPT | Performed by: EMERGENCY MEDICINE

## 2022-01-01 PROCEDURE — 85025 COMPLETE CBC W/AUTO DIFF WBC: CPT | Performed by: INTERNAL MEDICINE

## 2022-01-01 PROCEDURE — 25000003 PHARM REV CODE 250: Performed by: STUDENT IN AN ORGANIZED HEALTH CARE EDUCATION/TRAINING PROGRAM

## 2022-01-01 PROCEDURE — 36600 WITHDRAWAL OF ARTERIAL BLOOD: CPT

## 2022-01-01 PROCEDURE — 1160F PR REVIEW ALL MEDS BY PRESCRIBER/CLIN PHARMACIST DOCUMENTED: ICD-10-PCS | Mod: CPTII,S$GLB,, | Performed by: NURSE PRACTITIONER

## 2022-01-01 PROCEDURE — 99233 PR SUBSEQUENT HOSPITAL CARE,LEVL III: ICD-10-PCS | Mod: GT,,, | Performed by: PSYCHIATRY & NEUROLOGY

## 2022-01-01 PROCEDURE — 11000001 HC ACUTE MED/SURG PRIVATE ROOM

## 2022-01-01 PROCEDURE — 25000003 PHARM REV CODE 250: Performed by: INTERNAL MEDICINE

## 2022-01-01 PROCEDURE — 25000003 PHARM REV CODE 250: Performed by: PSYCHIATRY & NEUROLOGY

## 2022-01-01 PROCEDURE — 25000003 PHARM REV CODE 250: Performed by: NURSE ANESTHETIST, CERTIFIED REGISTERED

## 2022-01-01 PROCEDURE — 25000242 PHARM REV CODE 250 ALT 637 W/ HCPCS: Performed by: NURSE PRACTITIONER

## 2022-01-01 PROCEDURE — 86780 TREPONEMA PALLIDUM: CPT | Performed by: NURSE PRACTITIONER

## 2022-01-01 PROCEDURE — 1159F PR MEDICATION LIST DOCUMENTED IN MEDICAL RECORD: ICD-10-PCS | Mod: CPTII,S$GLB,, | Performed by: PSYCHIATRY & NEUROLOGY

## 2022-01-01 PROCEDURE — 92611 MOTION FLUOROSCOPY/SWALLOW: CPT

## 2022-01-01 PROCEDURE — 83735 ASSAY OF MAGNESIUM: CPT | Performed by: EMERGENCY MEDICINE

## 2022-01-01 PROCEDURE — 85025 COMPLETE CBC W/AUTO DIFF WBC: CPT | Performed by: EMERGENCY MEDICINE

## 2022-01-01 PROCEDURE — 85027 COMPLETE CBC AUTOMATED: CPT | Performed by: STUDENT IN AN ORGANIZED HEALTH CARE EDUCATION/TRAINING PROGRAM

## 2022-01-01 PROCEDURE — 43239 EGD BIOPSY SINGLE/MULTIPLE: CPT

## 2022-01-01 PROCEDURE — 84484 ASSAY OF TROPONIN QUANT: CPT | Performed by: STUDENT IN AN ORGANIZED HEALTH CARE EDUCATION/TRAINING PROGRAM

## 2022-01-01 PROCEDURE — 85025 COMPLETE CBC W/AUTO DIFF WBC: CPT | Performed by: STUDENT IN AN ORGANIZED HEALTH CARE EDUCATION/TRAINING PROGRAM

## 2022-01-01 PROCEDURE — 94660 CPAP INITIATION&MGMT: CPT

## 2022-01-01 PROCEDURE — 87077 CULTURE AEROBIC IDENTIFY: CPT | Performed by: GENERAL PRACTICE

## 2022-01-01 PROCEDURE — 25000003 PHARM REV CODE 250: Performed by: NURSE PRACTITIONER

## 2022-01-01 PROCEDURE — 99223 1ST HOSP IP/OBS HIGH 75: CPT | Mod: ,,, | Performed by: PSYCHIATRY & NEUROLOGY

## 2022-01-01 PROCEDURE — 83516 IMMUNOASSAY NONANTIBODY: CPT | Performed by: INTERNAL MEDICINE

## 2022-01-01 PROCEDURE — 84100 ASSAY OF PHOSPHORUS: CPT | Performed by: INTERNAL MEDICINE

## 2022-01-01 PROCEDURE — 36415 COLL VENOUS BLD VENIPUNCTURE: CPT | Performed by: STUDENT IN AN ORGANIZED HEALTH CARE EDUCATION/TRAINING PROGRAM

## 2022-01-01 PROCEDURE — 97116 GAIT TRAINING THERAPY: CPT | Mod: CQ

## 2022-01-01 PROCEDURE — 81003 URINALYSIS AUTO W/O SCOPE: CPT | Performed by: INTERNAL MEDICINE

## 2022-01-01 PROCEDURE — A4216 STERILE WATER/SALINE, 10 ML: HCPCS | Performed by: STUDENT IN AN ORGANIZED HEALTH CARE EDUCATION/TRAINING PROGRAM

## 2022-01-01 PROCEDURE — 1159F PR MEDICATION LIST DOCUMENTED IN MEDICAL RECORD: ICD-10-PCS | Mod: CPTII,S$GLB,, | Performed by: NURSE PRACTITIONER

## 2022-01-01 PROCEDURE — 0240U COVID/FLU A&B PCR: CPT | Performed by: INTERNAL MEDICINE

## 2022-01-01 PROCEDURE — 27000221 HC OXYGEN, UP TO 24 HOURS

## 2022-01-01 PROCEDURE — 93010 EKG 12-LEAD: ICD-10-PCS | Mod: ,,, | Performed by: INTERNAL MEDICINE

## 2022-01-01 PROCEDURE — 3077F PR MOST RECENT SYSTOLIC BLOOD PRESSURE >= 140 MM HG: ICD-10-PCS | Mod: CPTII,S$GLB,, | Performed by: NURSE PRACTITIONER

## 2022-01-01 PROCEDURE — 82803 BLOOD GASES ANY COMBINATION: CPT

## 2022-01-01 PROCEDURE — 21400001 HC TELEMETRY ROOM

## 2022-01-01 PROCEDURE — 1100F PTFALLS ASSESS-DOCD GE2>/YR: CPT | Mod: CPTII,S$GLB,, | Performed by: NURSE PRACTITIONER

## 2022-01-01 PROCEDURE — 1160F RVW MEDS BY RX/DR IN RCRD: CPT | Mod: CPTII,S$GLB,, | Performed by: NURSE PRACTITIONER

## 2022-01-01 PROCEDURE — 87389 HIV-1 AG W/HIV-1&-2 AB AG IA: CPT | Performed by: NURSE PRACTITIONER

## 2022-01-01 PROCEDURE — 80235 DRUG ASSAY LACOSAMIDE: CPT | Performed by: INTERNAL MEDICINE

## 2022-01-01 PROCEDURE — 3288F FALL RISK ASSESSMENT DOCD: CPT | Mod: CPTII,S$GLB,, | Performed by: NURSE PRACTITIONER

## 2022-01-01 PROCEDURE — 1100F PR PT FALLS ASSESS DOC 2+ FALLS/FALL W/INJURY/YR: ICD-10-PCS | Mod: CPTII,S$GLB,, | Performed by: NURSE PRACTITIONER

## 2022-01-01 PROCEDURE — 83735 ASSAY OF MAGNESIUM: CPT | Performed by: NURSE PRACTITIONER

## 2022-01-01 PROCEDURE — 99233 PR SUBSEQUENT HOSPITAL CARE,LEVL III: ICD-10-PCS | Mod: ,,, | Performed by: GENERAL PRACTICE

## 2022-01-01 PROCEDURE — 99233 SBSQ HOSP IP/OBS HIGH 50: CPT | Mod: ,,, | Performed by: GENERAL PRACTICE

## 2022-01-01 PROCEDURE — 84300 ASSAY OF URINE SODIUM: CPT | Performed by: STUDENT IN AN ORGANIZED HEALTH CARE EDUCATION/TRAINING PROGRAM

## 2022-01-01 PROCEDURE — 36569 INSJ PICC 5 YR+ W/O IMAGING: CPT

## 2022-01-01 PROCEDURE — 96374 THER/PROPH/DIAG INJ IV PUSH: CPT

## 2022-01-01 PROCEDURE — 3288F PR FALLS RISK ASSESSMENT DOCUMENTED: ICD-10-PCS | Mod: CPTII,S$GLB,, | Performed by: NURSE PRACTITIONER

## 2022-01-01 PROCEDURE — 25000003 PHARM REV CODE 250: Performed by: HOSPITALIST

## 2022-01-01 PROCEDURE — 63600175 PHARM REV CODE 636 W HCPCS: Performed by: INTERNAL MEDICINE

## 2022-01-01 PROCEDURE — 93010 ELECTROCARDIOGRAM REPORT: CPT | Mod: ,,, | Performed by: INTERNAL MEDICINE

## 2022-01-01 PROCEDURE — 86036 ANCA SCREEN EACH ANTIBODY: CPT | Performed by: STUDENT IN AN ORGANIZED HEALTH CARE EDUCATION/TRAINING PROGRAM

## 2022-01-01 PROCEDURE — 27201423 OPTIME MED/SURG SUP & DEVICES STERILE SUPPLY: Performed by: INTERNAL MEDICINE

## 2022-01-01 PROCEDURE — 36415 COLL VENOUS BLD VENIPUNCTURE: CPT | Performed by: GENERAL PRACTICE

## 2022-01-01 PROCEDURE — 63600175 PHARM REV CODE 636 W HCPCS: Performed by: STUDENT IN AN ORGANIZED HEALTH CARE EDUCATION/TRAINING PROGRAM

## 2022-01-01 PROCEDURE — 36415 COLL VENOUS BLD VENIPUNCTURE: CPT | Performed by: NURSE PRACTITIONER

## 2022-01-01 PROCEDURE — 37799 UNLISTED PX VASCULAR SURGERY: CPT

## 2022-01-01 PROCEDURE — 1126F PR PAIN SEVERITY QUANTIFIED, NO PAIN PRESENT: ICD-10-PCS | Mod: CPTII,S$GLB,, | Performed by: NURSE PRACTITIONER

## 2022-01-01 PROCEDURE — 80053 COMPREHEN METABOLIC PANEL: CPT | Performed by: INTERNAL MEDICINE

## 2022-01-01 PROCEDURE — 99214 OFFICE O/P EST MOD 30 MIN: CPT | Mod: S$GLB,,, | Performed by: NURSE PRACTITIONER

## 2022-01-01 PROCEDURE — 80202 ASSAY OF VANCOMYCIN: CPT | Performed by: INTERNAL MEDICINE

## 2022-01-01 PROCEDURE — 99214 PR OFFICE/OUTPT VISIT, EST, LEVL IV, 30-39 MIN: ICD-10-PCS | Mod: S$GLB,,, | Performed by: NURSE PRACTITIONER

## 2022-01-01 PROCEDURE — 99999 PR PBB SHADOW E&M-EST. PATIENT-LVL IV: CPT | Mod: PBBFAC,,, | Performed by: NURSE PRACTITIONER

## 2022-01-01 PROCEDURE — 84484 ASSAY OF TROPONIN QUANT: CPT | Performed by: EMERGENCY MEDICINE

## 2022-01-01 PROCEDURE — 86160 COMPLEMENT ANTIGEN: CPT | Performed by: STUDENT IN AN ORGANIZED HEALTH CARE EDUCATION/TRAINING PROGRAM

## 2022-01-01 PROCEDURE — 25000242 PHARM REV CODE 250 ALT 637 W/ HCPCS: Performed by: INTERNAL MEDICINE

## 2022-01-01 PROCEDURE — 84100 ASSAY OF PHOSPHORUS: CPT | Performed by: STUDENT IN AN ORGANIZED HEALTH CARE EDUCATION/TRAINING PROGRAM

## 2022-01-01 PROCEDURE — 86704 HEP B CORE ANTIBODY TOTAL: CPT | Performed by: NURSE PRACTITIONER

## 2022-01-01 PROCEDURE — C9113 INJ PANTOPRAZOLE SODIUM, VIA: HCPCS | Performed by: STUDENT IN AN ORGANIZED HEALTH CARE EDUCATION/TRAINING PROGRAM

## 2022-01-01 PROCEDURE — 37000008 HC ANESTHESIA 1ST 15 MINUTES: Performed by: INTERNAL MEDICINE

## 2022-01-01 PROCEDURE — 99223 1ST HOSP IP/OBS HIGH 75: CPT | Mod: ,,, | Performed by: NEUROLOGICAL SURGERY

## 2022-01-01 PROCEDURE — 94761 N-INVAS EAR/PLS OXIMETRY MLT: CPT

## 2022-01-01 PROCEDURE — 63600175 PHARM REV CODE 636 W HCPCS: Performed by: NURSE ANESTHETIST, CERTIFIED REGISTERED

## 2022-01-01 PROCEDURE — 93005 ELECTROCARDIOGRAM TRACING: CPT

## 2022-01-01 PROCEDURE — 99999 PR PBB SHADOW E&M-EST. PATIENT-LVL V: CPT | Mod: PBBFAC,,, | Performed by: NURSE PRACTITIONER

## 2022-01-01 PROCEDURE — 99223 PR INITIAL HOSPITAL CARE,LEVL III: ICD-10-PCS | Mod: ,,, | Performed by: PSYCHIATRY & NEUROLOGY

## 2022-01-01 PROCEDURE — 83735 ASSAY OF MAGNESIUM: CPT | Performed by: STUDENT IN AN ORGANIZED HEALTH CARE EDUCATION/TRAINING PROGRAM

## 2022-01-01 PROCEDURE — 3077F PR MOST RECENT SYSTOLIC BLOOD PRESSURE >= 140 MM HG: ICD-10-PCS | Mod: CPTII,S$GLB,, | Performed by: PSYCHIATRY & NEUROLOGY

## 2022-01-01 PROCEDURE — 97162 PT EVAL MOD COMPLEX 30 MIN: CPT

## 2022-01-01 PROCEDURE — 3077F SYST BP >= 140 MM HG: CPT | Mod: CPTII,S$GLB,, | Performed by: NURSE PRACTITIONER

## 2022-01-01 PROCEDURE — 37000009 HC ANESTHESIA EA ADD 15 MINS: Performed by: INTERNAL MEDICINE

## 2022-01-01 PROCEDURE — 99999 PR PBB SHADOW E&M-EST. PATIENT-LVL V: ICD-10-PCS | Mod: PBBFAC,,, | Performed by: NURSE PRACTITIONER

## 2022-01-01 PROCEDURE — 99233 PR SUBSEQUENT HOSPITAL CARE,LEVL III: ICD-10-PCS | Mod: FS,,, | Performed by: GENERAL PRACTICE

## 2022-01-01 PROCEDURE — 99291 CRITICAL CARE FIRST HOUR: CPT | Mod: FS,,, | Performed by: GENERAL PRACTICE

## 2022-01-01 PROCEDURE — 99231 PR SUBSEQUENT HOSPITAL CARE,LEVL I: ICD-10-PCS | Mod: ,,, | Performed by: NEUROLOGICAL SURGERY

## 2022-01-01 PROCEDURE — C9254 INJECTION, LACOSAMIDE: HCPCS | Performed by: STUDENT IN AN ORGANIZED HEALTH CARE EDUCATION/TRAINING PROGRAM

## 2022-01-01 PROCEDURE — 80053 COMPREHEN METABOLIC PANEL: CPT | Performed by: STUDENT IN AN ORGANIZED HEALTH CARE EDUCATION/TRAINING PROGRAM

## 2022-01-01 PROCEDURE — 3077F SYST BP >= 140 MM HG: CPT | Mod: CPTII,S$GLB,, | Performed by: PSYCHIATRY & NEUROLOGY

## 2022-01-01 PROCEDURE — 86039 ANTINUCLEAR ANTIBODIES (ANA): CPT | Performed by: STUDENT IN AN ORGANIZED HEALTH CARE EDUCATION/TRAINING PROGRAM

## 2022-01-01 PROCEDURE — 99231 SBSQ HOSP IP/OBS SF/LOW 25: CPT | Mod: ,,, | Performed by: NEUROLOGICAL SURGERY

## 2022-01-01 PROCEDURE — 36415 COLL VENOUS BLD VENIPUNCTURE: CPT | Performed by: INTERNAL MEDICINE

## 2022-01-01 PROCEDURE — 81001 URINALYSIS AUTO W/SCOPE: CPT | Performed by: PSYCHIATRY & NEUROLOGY

## 2022-01-01 PROCEDURE — 36415 COLL VENOUS BLD VENIPUNCTURE: CPT

## 2022-01-01 PROCEDURE — 27000190 HC CPAP FULL FACE MASK W/VALVE

## 2022-01-01 PROCEDURE — 81001 URINALYSIS AUTO W/SCOPE: CPT | Performed by: INTERNAL MEDICINE

## 2022-01-01 PROCEDURE — 3079F PR MOST RECENT DIASTOLIC BLOOD PRESSURE 80-89 MM HG: ICD-10-PCS | Mod: CPTII,S$GLB,, | Performed by: NURSE PRACTITIONER

## 2022-01-01 PROCEDURE — 99233 SBSQ HOSP IP/OBS HIGH 50: CPT | Mod: FS,,, | Performed by: GENERAL PRACTICE

## 2022-01-01 PROCEDURE — 1126F AMNT PAIN NOTED NONE PRSNT: CPT | Mod: CPTII,S$GLB,, | Performed by: NURSE PRACTITIONER

## 2022-01-01 PROCEDURE — 43450 DILATE ESOPHAGUS 1/MULT PASS: CPT | Performed by: INTERNAL MEDICINE

## 2022-01-01 PROCEDURE — 3079F DIAST BP 80-89 MM HG: CPT | Mod: CPTII,S$GLB,, | Performed by: NURSE PRACTITIONER

## 2022-01-01 PROCEDURE — 25000242 PHARM REV CODE 250 ALT 637 W/ HCPCS: Performed by: STUDENT IN AN ORGANIZED HEALTH CARE EDUCATION/TRAINING PROGRAM

## 2022-01-01 PROCEDURE — 88305 TISSUE EXAM BY PATHOLOGIST: CPT | Performed by: INTERNAL MEDICINE

## 2022-01-01 PROCEDURE — 87040 BLOOD CULTURE FOR BACTERIA: CPT | Performed by: NURSE PRACTITIONER

## 2022-01-01 PROCEDURE — 20000000 HC ICU ROOM

## 2022-01-01 PROCEDURE — 85007 BL SMEAR W/DIFF WBC COUNT: CPT | Performed by: STUDENT IN AN ORGANIZED HEALTH CARE EDUCATION/TRAINING PROGRAM

## 2022-01-01 PROCEDURE — 86709 HEPATITIS A IGM ANTIBODY: CPT | Performed by: NURSE PRACTITIONER

## 2022-01-01 PROCEDURE — 87040 BLOOD CULTURE FOR BACTERIA: CPT | Performed by: INTERNAL MEDICINE

## 2022-01-01 PROCEDURE — 87633 RESP VIRUS 12-25 TARGETS: CPT | Performed by: INTERNAL MEDICINE

## 2022-01-01 PROCEDURE — 1126F PR PAIN SEVERITY QUANTIFIED, NO PAIN PRESENT: ICD-10-PCS | Mod: CPTII,S$GLB,, | Performed by: PSYCHIATRY & NEUROLOGY

## 2022-01-01 PROCEDURE — 99214 OFFICE O/P EST MOD 30 MIN: CPT | Mod: S$GLB,,, | Performed by: PSYCHIATRY & NEUROLOGY

## 2022-01-01 PROCEDURE — 1101F PT FALLS ASSESS-DOCD LE1/YR: CPT | Mod: CPTII,S$GLB,, | Performed by: PSYCHIATRY & NEUROLOGY

## 2022-01-01 PROCEDURE — 97166 OT EVAL MOD COMPLEX 45 MIN: CPT

## 2022-01-01 PROCEDURE — 99358 PR PROLONGED SERV,NO CONTACT,1ST HR: ICD-10-PCS | Mod: ,,, | Performed by: PSYCHIATRY & NEUROLOGY

## 2022-01-01 PROCEDURE — 3288F FALL RISK ASSESSMENT DOCD: CPT | Mod: CPTII,S$GLB,, | Performed by: PSYCHIATRY & NEUROLOGY

## 2022-01-01 PROCEDURE — 3288F PR FALLS RISK ASSESSMENT DOCUMENTED: ICD-10-PCS | Mod: CPTII,S$GLB,, | Performed by: PSYCHIATRY & NEUROLOGY

## 2022-01-01 PROCEDURE — 99358 PROLONG SERVICE W/O CONTACT: CPT | Mod: ,,, | Performed by: PSYCHIATRY & NEUROLOGY

## 2022-01-01 PROCEDURE — 99232 PR SUBSEQUENT HOSPITAL CARE,LEVL II: ICD-10-PCS | Mod: ,,, | Performed by: NEUROLOGICAL SURGERY

## 2022-01-01 PROCEDURE — 99285 EMERGENCY DEPT VISIT HI MDM: CPT | Mod: 25

## 2022-01-01 PROCEDURE — 83735 ASSAY OF MAGNESIUM: CPT | Performed by: INTERNAL MEDICINE

## 2022-01-01 PROCEDURE — 82570 ASSAY OF URINE CREATININE: CPT | Performed by: STUDENT IN AN ORGANIZED HEALTH CARE EDUCATION/TRAINING PROGRAM

## 2022-01-01 PROCEDURE — 83605 ASSAY OF LACTIC ACID: CPT | Performed by: STUDENT IN AN ORGANIZED HEALTH CARE EDUCATION/TRAINING PROGRAM

## 2022-01-01 PROCEDURE — 99999 PR PBB SHADOW E&M-EST. PATIENT-LVL III: ICD-10-PCS | Mod: PBBFAC,,, | Performed by: PSYCHIATRY & NEUROLOGY

## 2022-01-01 PROCEDURE — 99999 PR PBB SHADOW E&M-EST. PATIENT-LVL III: CPT | Mod: PBBFAC,,, | Performed by: PSYCHIATRY & NEUROLOGY

## 2022-01-01 PROCEDURE — 99291 PR CRITICAL CARE, E/M 30-74 MINUTES: ICD-10-PCS | Mod: FS,,, | Performed by: GENERAL PRACTICE

## 2022-01-01 PROCEDURE — C1751 CATH, INF, PER/CENT/MIDLINE: HCPCS

## 2022-01-01 PROCEDURE — 1159F MED LIST DOCD IN RCRD: CPT | Mod: CPTII,S$GLB,, | Performed by: PSYCHIATRY & NEUROLOGY

## 2022-01-01 PROCEDURE — 99214 PR OFFICE/OUTPT VISIT, EST, LEVL IV, 30-39 MIN: ICD-10-PCS | Mod: S$GLB,,, | Performed by: PSYCHIATRY & NEUROLOGY

## 2022-01-01 PROCEDURE — 99233 SBSQ HOSP IP/OBS HIGH 50: CPT | Mod: GT,,, | Performed by: PSYCHIATRY & NEUROLOGY

## 2022-01-01 PROCEDURE — 99999 PR PBB SHADOW E&M-EST. PATIENT-LVL IV: ICD-10-PCS | Mod: PBBFAC,,, | Performed by: NURSE PRACTITIONER

## 2022-01-01 PROCEDURE — 87340 HEPATITIS B SURFACE AG IA: CPT | Performed by: NURSE PRACTITIONER

## 2022-01-01 PROCEDURE — 80175 DRUG SCREEN QUAN LAMOTRIGINE: CPT

## 2022-01-01 PROCEDURE — 97110 THERAPEUTIC EXERCISES: CPT | Mod: CQ

## 2022-01-01 PROCEDURE — 3079F PR MOST RECENT DIASTOLIC BLOOD PRESSURE 80-89 MM HG: ICD-10-PCS | Mod: CPTII,S$GLB,, | Performed by: PSYCHIATRY & NEUROLOGY

## 2022-01-01 PROCEDURE — 84100 ASSAY OF PHOSPHORUS: CPT | Performed by: NURSE PRACTITIONER

## 2022-01-01 PROCEDURE — 99223 PR INITIAL HOSPITAL CARE,LEVL III: ICD-10-PCS | Mod: ,,, | Performed by: NEUROLOGICAL SURGERY

## 2022-01-01 PROCEDURE — 86706 HEP B SURFACE ANTIBODY: CPT | Performed by: NURSE PRACTITIONER

## 2022-01-01 PROCEDURE — 86803 HEPATITIS C AB TEST: CPT | Performed by: NURSE PRACTITIONER

## 2022-01-01 PROCEDURE — 1126F AMNT PAIN NOTED NONE PRSNT: CPT | Mod: CPTII,S$GLB,, | Performed by: PSYCHIATRY & NEUROLOGY

## 2022-01-01 RX ORDER — CLONIDINE HYDROCHLORIDE 0.1 MG/1
1 TABLET ORAL EVERY 8 HOURS PRN
COMMUNITY
Start: 2022-01-01

## 2022-01-01 RX ORDER — LIDOCAINE HYDROCHLORIDE 20 MG/ML
INJECTION, SOLUTION EPIDURAL; INFILTRATION; INTRACAUDAL; PERINEURAL
Status: COMPLETED
Start: 2022-01-01 | End: 2022-01-01

## 2022-01-01 RX ORDER — LACOSAMIDE 100 MG/1
TABLET, FILM COATED ORAL
Qty: 180 TABLET | Refills: 0 | Status: SHIPPED | OUTPATIENT
Start: 2022-01-01 | End: 2022-01-01 | Stop reason: SDUPTHER

## 2022-01-01 RX ORDER — SODIUM CHLORIDE, SODIUM GLUCONATE, SODIUM ACETATE, POTASSIUM CHLORIDE AND MAGNESIUM CHLORIDE 30; 37; 368; 526; 502 MG/100ML; MG/100ML; MG/100ML; MG/100ML; MG/100ML
INJECTION, SOLUTION INTRAVENOUS CONTINUOUS
Status: CANCELLED | OUTPATIENT
Start: 2022-01-01 | End: 2022-12-11

## 2022-01-01 RX ORDER — NIFEDIPINE 60 MG/1
60 TABLET, EXTENDED RELEASE ORAL
COMMUNITY
Start: 2022-01-01 | End: 2022-01-01

## 2022-01-01 RX ORDER — LACOSAMIDE 100 MG/1
150 TABLET ORAL DAILY
COMMUNITY
Start: 2022-01-01 | End: 2022-01-01 | Stop reason: SDUPTHER

## 2022-01-01 RX ORDER — HYDROCODONE BITARTRATE AND ACETAMINOPHEN 10; 325 MG/1; MG/1
1 TABLET ORAL EVERY 4 HOURS PRN
Status: DISCONTINUED | OUTPATIENT
Start: 2022-01-01 | End: 2022-01-01 | Stop reason: HOSPADM

## 2022-01-01 RX ORDER — PANTOPRAZOLE SODIUM 40 MG/10ML
40 INJECTION, POWDER, LYOPHILIZED, FOR SOLUTION INTRAVENOUS DAILY
Status: CANCELLED | OUTPATIENT
Start: 2022-01-01

## 2022-01-01 RX ORDER — DEXMEDETOMIDINE HYDROCHLORIDE 4 UG/ML
INJECTION, SOLUTION INTRAVENOUS
Status: COMPLETED
Start: 2022-01-01 | End: 2022-01-01

## 2022-01-01 RX ORDER — LAMOTRIGINE 200 MG/1
1.5 TABLET ORAL NIGHTLY
COMMUNITY
Start: 2022-01-01 | End: 2022-01-01 | Stop reason: SDUPTHER

## 2022-01-01 RX ORDER — LAMOTRIGINE 100 MG/1
200 TABLET ORAL 2 TIMES DAILY
Status: DISCONTINUED | OUTPATIENT
Start: 2022-01-01 | End: 2022-01-01 | Stop reason: HOSPADM

## 2022-01-01 RX ORDER — CALCIUM GLUCONATE 20 MG/ML
1 INJECTION, SOLUTION INTRAVENOUS ONCE
Status: COMPLETED | OUTPATIENT
Start: 2022-01-01 | End: 2022-01-01

## 2022-01-01 RX ORDER — NOREPINEPHRINE BITARTRATE/D5W 8 MG/250ML
PLASTIC BAG, INJECTION (ML) INTRAVENOUS
Status: COMPLETED
Start: 2022-01-01 | End: 2022-01-01

## 2022-01-01 RX ORDER — LACOSAMIDE 50 MG/1
100 TABLET ORAL EVERY 12 HOURS
Status: DISCONTINUED | OUTPATIENT
Start: 2022-01-01 | End: 2022-01-01

## 2022-01-01 RX ORDER — GLYCOPYRROLATE 0.2 MG/ML
0.1 INJECTION INTRAMUSCULAR; INTRAVENOUS ONCE
Status: COMPLETED | OUTPATIENT
Start: 2022-01-01 | End: 2022-01-01

## 2022-01-01 RX ORDER — CARVEDILOL 3.12 MG/1
3.12 TABLET ORAL 2 TIMES DAILY
Status: DISCONTINUED | OUTPATIENT
Start: 2022-01-01 | End: 2022-01-01

## 2022-01-01 RX ORDER — LAMOTRIGINE 200 MG/1
TABLET ORAL
Qty: 90 TABLET | Refills: 12 | Status: SHIPPED | OUTPATIENT
Start: 2022-01-01

## 2022-01-01 RX ORDER — CETIRIZINE HYDROCHLORIDE 10 MG/1
10 TABLET ORAL DAILY
COMMUNITY
Start: 2022-01-01 | End: 2023-04-14

## 2022-01-01 RX ORDER — MUPIROCIN 20 MG/G
OINTMENT TOPICAL 2 TIMES DAILY
Status: DISCONTINUED | OUTPATIENT
Start: 2022-01-01 | End: 2022-01-01 | Stop reason: HOSPADM

## 2022-01-01 RX ORDER — ATORVASTATIN CALCIUM 40 MG/1
40 TABLET, FILM COATED ORAL DAILY
Status: DISCONTINUED | OUTPATIENT
Start: 2022-01-01 | End: 2022-01-01 | Stop reason: HOSPADM

## 2022-01-01 RX ORDER — SODIUM CHLORIDE 0.9 % (FLUSH) 0.9 %
10 SYRINGE (ML) INJECTION
Status: DISCONTINUED | OUTPATIENT
Start: 2022-01-01 | End: 2022-01-01 | Stop reason: HOSPADM

## 2022-01-01 RX ORDER — BISACODYL 10 MG
10 SUPPOSITORY, RECTAL RECTAL EVERY 12 HOURS
Status: DISCONTINUED | OUTPATIENT
Start: 2022-01-01 | End: 2022-01-01 | Stop reason: HOSPADM

## 2022-01-01 RX ORDER — ATORVASTATIN CALCIUM 40 MG/1
1 TABLET, FILM COATED ORAL DAILY
COMMUNITY
Start: 2022-01-01

## 2022-01-01 RX ORDER — ESCITALOPRAM OXALATE 5 MG/1
5 TABLET ORAL DAILY
Status: DISCONTINUED | OUTPATIENT
Start: 2022-01-01 | End: 2022-01-01 | Stop reason: HOSPADM

## 2022-01-01 RX ORDER — TALC
6 POWDER (GRAM) TOPICAL NIGHTLY PRN
Status: CANCELLED | OUTPATIENT
Start: 2022-01-01

## 2022-01-01 RX ORDER — SODIUM CHLORIDE 9 MG/ML
INJECTION, SOLUTION INTRAVENOUS ONCE
Status: COMPLETED | OUTPATIENT
Start: 2022-01-01 | End: 2022-01-01

## 2022-01-01 RX ORDER — CARBIDOPA AND LEVODOPA 10; 100 MG/1; MG/1
1 TABLET ORAL 3 TIMES DAILY
Status: DISCONTINUED | OUTPATIENT
Start: 2022-01-01 | End: 2022-01-01

## 2022-01-01 RX ORDER — FUROSEMIDE 40 MG/1
40 TABLET ORAL DAILY
Status: DISCONTINUED | OUTPATIENT
Start: 2022-01-01 | End: 2022-01-01

## 2022-01-01 RX ORDER — PROPOFOL 10 MG/ML
VIAL (ML) INTRAVENOUS
Status: DISPENSED
Start: 2022-01-01 | End: 2022-01-01

## 2022-01-01 RX ORDER — DEXMEDETOMIDINE HYDROCHLORIDE 4 UG/ML
0-1.4 INJECTION, SOLUTION INTRAVENOUS CONTINUOUS
Status: DISCONTINUED | OUTPATIENT
Start: 2022-01-01 | End: 2022-01-01 | Stop reason: HOSPADM

## 2022-01-01 RX ORDER — IPRATROPIUM BROMIDE AND ALBUTEROL SULFATE 2.5; .5 MG/3ML; MG/3ML
3 SOLUTION RESPIRATORY (INHALATION)
Status: COMPLETED | OUTPATIENT
Start: 2022-01-01 | End: 2022-01-01

## 2022-01-01 RX ORDER — NIFEDIPINE 60 MG/1
60 TABLET, EXTENDED RELEASE ORAL DAILY
Status: DISCONTINUED | OUTPATIENT
Start: 2022-01-01 | End: 2022-01-01

## 2022-01-01 RX ORDER — PROPOFOL 10 MG/ML
VIAL (ML) INTRAVENOUS
Status: DISCONTINUED | OUTPATIENT
Start: 2022-01-01 | End: 2022-01-01

## 2022-01-01 RX ORDER — MORPHINE SULFATE 4 MG/ML
2 INJECTION, SOLUTION INTRAMUSCULAR; INTRAVENOUS
Status: DISCONTINUED | OUTPATIENT
Start: 2022-01-01 | End: 2022-01-01 | Stop reason: HOSPADM

## 2022-01-01 RX ORDER — FLUTICASONE PROPIONATE 50 MCG
1 SPRAY, SUSPENSION (ML) NASAL DAILY
Status: DISCONTINUED | OUTPATIENT
Start: 2022-01-01 | End: 2022-01-01

## 2022-01-01 RX ORDER — SODIUM CHLORIDE 9 MG/ML
INJECTION, SOLUTION INTRAVENOUS CONTINUOUS
Status: DISCONTINUED | OUTPATIENT
Start: 2022-01-01 | End: 2022-01-01 | Stop reason: HOSPADM

## 2022-01-01 RX ORDER — ACETAMINOPHEN 325 MG/1
650 TABLET ORAL EVERY 4 HOURS PRN
Status: DISCONTINUED | OUTPATIENT
Start: 2022-01-01 | End: 2022-01-01 | Stop reason: HOSPADM

## 2022-01-01 RX ORDER — NIFEDIPINE 30 MG/1
30 TABLET, FILM COATED, EXTENDED RELEASE ORAL DAILY
COMMUNITY
Start: 2021-01-01 | End: 2022-01-01

## 2022-01-01 RX ORDER — ASPIRIN 81 MG/1
81 TABLET ORAL DAILY
COMMUNITY

## 2022-01-01 RX ORDER — FLUTICASONE PROPIONATE 50 MCG
1 SPRAY, SUSPENSION (ML) NASAL DAILY
COMMUNITY
Start: 2022-01-01

## 2022-01-01 RX ORDER — PANTOPRAZOLE SODIUM 40 MG/10ML
40 INJECTION, POWDER, LYOPHILIZED, FOR SOLUTION INTRAVENOUS DAILY
Status: DISCONTINUED | OUTPATIENT
Start: 2022-01-01 | End: 2022-01-01 | Stop reason: HOSPADM

## 2022-01-01 RX ORDER — CLONIDINE HYDROCHLORIDE 0.1 MG/1
0.1 TABLET ORAL EVERY 8 HOURS PRN
Status: DISCONTINUED | OUTPATIENT
Start: 2022-01-01 | End: 2022-01-01

## 2022-01-01 RX ORDER — SODIUM CHLORIDE 0.9 % (FLUSH) 0.9 %
10 SYRINGE (ML) INJECTION EVERY 6 HOURS
Status: DISCONTINUED | OUTPATIENT
Start: 2022-01-01 | End: 2022-01-01 | Stop reason: HOSPADM

## 2022-01-01 RX ORDER — SODIUM BICARBONATE 1 MEQ/ML
50 SYRINGE (ML) INTRAVENOUS ONCE
Status: COMPLETED | OUTPATIENT
Start: 2022-01-01 | End: 2022-01-01

## 2022-01-01 RX ORDER — LIDOCAINE HYDROCHLORIDE 20 MG/ML
INJECTION, SOLUTION EPIDURAL; INFILTRATION; INTRACAUDAL; PERINEURAL
Status: DISCONTINUED | OUTPATIENT
Start: 2022-01-01 | End: 2022-01-01

## 2022-01-01 RX ORDER — NIFEDIPINE 60 MG/1
60 TABLET, EXTENDED RELEASE ORAL DAILY
COMMUNITY
Start: 2022-01-01

## 2022-01-01 RX ORDER — ALBUTEROL SULFATE 90 UG/1
2 AEROSOL, METERED RESPIRATORY (INHALATION) EVERY 4 HOURS PRN
Status: DISCONTINUED | OUTPATIENT
Start: 2022-01-01 | End: 2022-01-01 | Stop reason: HOSPADM

## 2022-01-01 RX ORDER — ENOXAPARIN SODIUM 100 MG/ML
30 INJECTION SUBCUTANEOUS
Status: DISCONTINUED | OUTPATIENT
Start: 2022-01-01 | End: 2022-01-01 | Stop reason: HOSPADM

## 2022-01-01 RX ORDER — VANCOMYCIN HCL IN 5 % DEXTROSE 1G/250ML
1000 PLASTIC BAG, INJECTION (ML) INTRAVENOUS ONCE
Status: DISCONTINUED | OUTPATIENT
Start: 2022-01-01 | End: 2022-01-01

## 2022-01-01 RX ORDER — CITALOPRAM 20 MG/1
20 TABLET, FILM COATED ORAL DAILY
COMMUNITY
Start: 2022-01-01 | End: 2022-01-01

## 2022-01-01 RX ORDER — LACOSAMIDE 100 MG/1
100 TABLET ORAL EVERY 12 HOURS
Qty: 60 TABLET | Refills: 5
Start: 2022-01-01 | End: 2022-01-01

## 2022-01-01 RX ORDER — LIDOCAINE HYDROCHLORIDE 10 MG/ML
1 INJECTION, SOLUTION EPIDURAL; INFILTRATION; INTRACAUDAL; PERINEURAL ONCE
Status: CANCELLED | OUTPATIENT
Start: 2022-01-01 | End: 2022-01-01

## 2022-01-01 RX ORDER — NIFEDIPINE 60 MG/1
1 TABLET, EXTENDED RELEASE ORAL NIGHTLY
COMMUNITY
Start: 2022-01-01 | End: 2022-01-01

## 2022-01-01 RX ORDER — ALBUTEROL SULFATE 90 UG/1
2 AEROSOL, METERED RESPIRATORY (INHALATION) EVERY 4 HOURS PRN
COMMUNITY
Start: 2021-01-01

## 2022-01-01 RX ORDER — ENOXAPARIN SODIUM 100 MG/ML
30 INJECTION SUBCUTANEOUS EVERY 24 HOURS
Status: DISCONTINUED | OUTPATIENT
Start: 2022-01-01 | End: 2022-01-01

## 2022-01-01 RX ORDER — PANTOPRAZOLE SODIUM 40 MG/1
40 TABLET, DELAYED RELEASE ORAL
Status: DISCONTINUED | OUTPATIENT
Start: 2022-01-01 | End: 2022-01-01

## 2022-01-01 RX ORDER — VALSARTAN 80 MG/1
80 TABLET ORAL 2 TIMES DAILY
Status: DISCONTINUED | OUTPATIENT
Start: 2022-01-01 | End: 2022-01-01

## 2022-01-01 RX ORDER — ESCITALOPRAM OXALATE 5 MG/1
1 TABLET ORAL DAILY
COMMUNITY
Start: 2022-01-01

## 2022-01-01 RX ORDER — CARVEDILOL 25 MG/1
1 TABLET ORAL 2 TIMES DAILY
COMMUNITY
Start: 2022-01-01

## 2022-01-01 RX ORDER — MECLIZINE HYDROCHLORIDE 25 MG/1
25 TABLET ORAL 3 TIMES DAILY PRN
COMMUNITY
Start: 2022-01-01 | End: 2022-01-01

## 2022-01-01 RX ORDER — MAGNESIUM SULFATE 1 G/100ML
1 INJECTION INTRAVENOUS ONCE
Status: COMPLETED | OUTPATIENT
Start: 2022-01-01 | End: 2022-01-01

## 2022-01-01 RX ORDER — DONEPEZIL HYDROCHLORIDE 10 MG/1
10 TABLET, FILM COATED ORAL NIGHTLY
COMMUNITY
Start: 2022-01-01 | End: 2022-01-01

## 2022-01-01 RX ORDER — CARVEDILOL 12.5 MG/1
25 TABLET ORAL 2 TIMES DAILY
Status: DISCONTINUED | OUTPATIENT
Start: 2022-01-01 | End: 2022-01-01

## 2022-01-01 RX ORDER — ASPIRIN 81 MG/1
81 TABLET ORAL DAILY
Status: DISCONTINUED | OUTPATIENT
Start: 2022-01-01 | End: 2022-01-01 | Stop reason: HOSPADM

## 2022-01-01 RX ORDER — IPRATROPIUM BROMIDE AND ALBUTEROL SULFATE 2.5; .5 MG/3ML; MG/3ML
3 SOLUTION RESPIRATORY (INHALATION) EVERY 6 HOURS
Status: DISCONTINUED | OUTPATIENT
Start: 2022-01-01 | End: 2022-01-01

## 2022-01-01 RX ORDER — FUROSEMIDE 40 MG/1
1 TABLET ORAL DAILY
COMMUNITY
Start: 2022-01-01

## 2022-01-01 RX ORDER — SODIUM CHLORIDE 0.9 % (FLUSH) 0.9 %
10 SYRINGE (ML) INJECTION
Status: CANCELLED | OUTPATIENT
Start: 2022-01-01

## 2022-01-01 RX ORDER — LACOSAMIDE 100 MG/1
100 TABLET ORAL EVERY 12 HOURS
Qty: 180 TABLET | Refills: 1 | Status: SHIPPED | OUTPATIENT
Start: 2022-01-01

## 2022-01-01 RX ORDER — IPRATROPIUM BROMIDE AND ALBUTEROL SULFATE 2.5; .5 MG/3ML; MG/3ML
3 SOLUTION RESPIRATORY (INHALATION) EVERY 6 HOURS
Status: DISCONTINUED | OUTPATIENT
Start: 2022-01-01 | End: 2022-01-01 | Stop reason: HOSPADM

## 2022-01-01 RX ORDER — NOREPINEPHRINE BITARTRATE/D5W 8 MG/250ML
0-3 PLASTIC BAG, INJECTION (ML) INTRAVENOUS CONTINUOUS
Status: DISCONTINUED | OUTPATIENT
Start: 2022-01-01 | End: 2022-01-01

## 2022-01-01 RX ORDER — POTASSIUM CHLORIDE 14.9 MG/ML
40 INJECTION INTRAVENOUS ONCE
Status: COMPLETED | OUTPATIENT
Start: 2022-01-01 | End: 2022-01-01

## 2022-01-01 RX ORDER — ENOXAPARIN SODIUM 100 MG/ML
40 INJECTION SUBCUTANEOUS
Status: DISCONTINUED | OUTPATIENT
Start: 2022-01-01 | End: 2022-01-01

## 2022-01-01 RX ADMIN — VASOPRESSIN 0.04 UNITS/MIN: 20 INJECTION INTRAVENOUS at 04:11

## 2022-01-01 RX ADMIN — Medication: at 08:11

## 2022-01-01 RX ADMIN — VASOPRESSIN 0.04 UNITS/MIN: 20 INJECTION INTRAVENOUS at 11:11

## 2022-01-01 RX ADMIN — CARBIDOPA AND LEVODOPA 1 TABLET: 10; 100 TABLET ORAL at 09:11

## 2022-01-01 RX ADMIN — SODIUM CHLORIDE 500 ML: 9 INJECTION, SOLUTION INTRAVENOUS at 12:11

## 2022-01-01 RX ADMIN — OXACILLIN SODIUM 2 G: 2 INJECTION, POWDER, FOR SOLUTION INTRAMUSCULAR; INTRAVENOUS at 12:11

## 2022-01-01 RX ADMIN — LAMOTRIGINE 200 MG: 100 TABLET ORAL at 08:11

## 2022-01-01 RX ADMIN — Medication: at 09:11

## 2022-01-01 RX ADMIN — IPRATROPIUM BROMIDE AND ALBUTEROL SULFATE 3 ML: 2.5; .5 SOLUTION RESPIRATORY (INHALATION) at 08:11

## 2022-01-01 RX ADMIN — OXACILLIN SODIUM 2 G: 2 INJECTION, POWDER, FOR SOLUTION INTRAMUSCULAR; INTRAVENOUS at 01:11

## 2022-01-01 RX ADMIN — DEXMEDETOMIDINE HYDROCHLORIDE: 400 INJECTION INTRAVENOUS at 07:11

## 2022-01-01 RX ADMIN — LIDOCAINE HYDROCHLORIDE 5 ML: 20 INJECTION, SOLUTION INTRAVENOUS at 12:11

## 2022-01-01 RX ADMIN — PROPOFOL 30 MG: 10 INJECTION, EMULSION INTRAVENOUS at 12:11

## 2022-01-01 RX ADMIN — NIFEDIPINE 60 MG: 60 TABLET, FILM COATED, EXTENDED RELEASE ORAL at 09:11

## 2022-01-01 RX ADMIN — MUPIROCIN: 20 OINTMENT TOPICAL at 08:11

## 2022-01-01 RX ADMIN — VASOPRESSIN 0.04 UNITS/MIN: 20 INJECTION INTRAVENOUS at 08:11

## 2022-01-01 RX ADMIN — SODIUM CHLORIDE, POTASSIUM CHLORIDE, SODIUM LACTATE AND CALCIUM CHLORIDE 1000 ML: 600; 310; 30; 20 INJECTION, SOLUTION INTRAVENOUS at 12:11

## 2022-01-01 RX ADMIN — SODIUM CHLORIDE: 9 INJECTION, SOLUTION INTRAVENOUS at 02:11

## 2022-01-01 RX ADMIN — VALSARTAN 80 MG: 80 TABLET, FILM COATED ORAL at 08:11

## 2022-01-01 RX ADMIN — PROPOFOL 50 MG: 10 INJECTION, EMULSION INTRAVENOUS at 12:11

## 2022-01-01 RX ADMIN — ATORVASTATIN CALCIUM 40 MG: 40 TABLET, FILM COATED ORAL at 08:11

## 2022-01-01 RX ADMIN — ASPIRIN 81 MG: 81 TABLET, COATED ORAL at 09:11

## 2022-01-01 RX ADMIN — LACOSAMIDE 100 MG: 50 TABLET, FILM COATED ORAL at 08:11

## 2022-01-01 RX ADMIN — CARVEDILOL 25 MG: 12.5 TABLET, FILM COATED ORAL at 08:11

## 2022-01-01 RX ADMIN — VANCOMYCIN HYDROCHLORIDE 1250 MG: 1.25 INJECTION, POWDER, LYOPHILIZED, FOR SOLUTION INTRAVENOUS at 02:11

## 2022-01-01 RX ADMIN — ESCITALOPRAM OXALATE 5 MG: 5 TABLET, FILM COATED ORAL at 08:11

## 2022-01-01 RX ADMIN — GLYCOPYRROLATE 0.1 MG: 0.2 INJECTION INTRAMUSCULAR; INTRAVENOUS at 10:11

## 2022-01-01 RX ADMIN — DEXTROSE 250 ML: 10 SOLUTION INTRAVENOUS at 04:11

## 2022-01-01 RX ADMIN — OXACILLIN SODIUM 2 G: 2 INJECTION, POWDER, FOR SOLUTION INTRAMUSCULAR; INTRAVENOUS at 04:11

## 2022-01-01 RX ADMIN — CARVEDILOL 25 MG: 12.5 TABLET, FILM COATED ORAL at 09:11

## 2022-01-01 RX ADMIN — VASOPRESSIN 0.04 UNITS/MIN: 20 INJECTION INTRAVENOUS at 10:11

## 2022-01-01 RX ADMIN — OXACILLIN SODIUM 2 G: 2 INJECTION, POWDER, FOR SOLUTION INTRAMUSCULAR; INTRAVENOUS at 08:11

## 2022-01-01 RX ADMIN — CARBIDOPA AND LEVODOPA 1 TABLET: 10; 100 TABLET ORAL at 08:11

## 2022-01-01 RX ADMIN — ESCITALOPRAM OXALATE 5 MG: 5 TABLET, FILM COATED ORAL at 09:11

## 2022-01-01 RX ADMIN — SODIUM CHLORIDE, PRESERVATIVE FREE 10 ML: 5 INJECTION INTRAVENOUS at 12:11

## 2022-01-01 RX ADMIN — SODIUM BICARBONATE 50 MEQ: 84 INJECTION INTRAVENOUS at 08:11

## 2022-01-01 RX ADMIN — ACETAMINOPHEN 650 MG: 325 TABLET, FILM COATED ORAL at 10:11

## 2022-01-01 RX ADMIN — ENOXAPARIN SODIUM 30 MG: 30 INJECTION SUBCUTANEOUS at 05:11

## 2022-01-01 RX ADMIN — CARBIDOPA AND LEVODOPA 1 TABLET: 10; 100 TABLET ORAL at 02:11

## 2022-01-01 RX ADMIN — SODIUM CHLORIDE, PRESERVATIVE FREE 10 ML: 5 INJECTION INTRAVENOUS at 06:11

## 2022-01-01 RX ADMIN — PROPOFOL 20 MG: 10 INJECTION, EMULSION INTRAVENOUS at 12:11

## 2022-01-01 RX ADMIN — SODIUM CHLORIDE, SODIUM GLUCONATE, SODIUM ACETATE, POTASSIUM CHLORIDE AND MAGNESIUM CHLORIDE: 526; 502; 368; 37; 30 INJECTION, SOLUTION INTRAVENOUS at 12:11

## 2022-01-01 RX ADMIN — VASOPRESSIN 0.04 UNITS/MIN: 20 INJECTION INTRAVENOUS at 01:11

## 2022-01-01 RX ADMIN — ENOXAPARIN SODIUM 40 MG: 40 INJECTION SUBCUTANEOUS at 02:11

## 2022-01-01 RX ADMIN — SODIUM CHLORIDE: 9 INJECTION, SOLUTION INTRAVENOUS at 08:11

## 2022-01-01 RX ADMIN — CALCIUM GLUCONATE 1 G: 20 INJECTION, SOLUTION INTRAVENOUS at 08:11

## 2022-01-01 RX ADMIN — SODIUM CHLORIDE: 9 INJECTION, SOLUTION INTRAVENOUS at 01:11

## 2022-01-01 RX ADMIN — SODIUM CHLORIDE 100 MG: 9 INJECTION, SOLUTION INTRAVENOUS at 05:11

## 2022-01-01 RX ADMIN — VALSARTAN 80 MG: 80 TABLET, FILM COATED ORAL at 09:11

## 2022-01-01 RX ADMIN — SODIUM CHLORIDE: 9 INJECTION, SOLUTION INTRAVENOUS at 07:11

## 2022-01-01 RX ADMIN — NOREPINEPHRINE BITARTRATE 0.48 MCG/KG/MIN: 1 INJECTION, SOLUTION, CONCENTRATE INTRAVENOUS at 02:11

## 2022-01-01 RX ADMIN — LACOSAMIDE 100 MG: 50 TABLET, FILM COATED ORAL at 09:11

## 2022-01-01 RX ADMIN — HYDROCORTISONE SODIUM SUCCINATE 100 MG: 100 INJECTION, POWDER, FOR SOLUTION INTRAMUSCULAR; INTRAVENOUS at 02:11

## 2022-01-01 RX ADMIN — FLUTICASONE PROPIONATE 50 MCG: 50 SPRAY, METERED NASAL at 11:11

## 2022-01-01 RX ADMIN — VANCOMYCIN HYDROCHLORIDE 1250 MG: 1.25 INJECTION, POWDER, LYOPHILIZED, FOR SOLUTION INTRAVENOUS at 10:11

## 2022-01-01 RX ADMIN — HYDROCORTISONE SODIUM SUCCINATE 100 MG: 100 INJECTION, POWDER, FOR SOLUTION INTRAMUSCULAR; INTRAVENOUS at 06:11

## 2022-01-01 RX ADMIN — FUROSEMIDE 40 MG: 40 TABLET ORAL at 09:11

## 2022-01-01 RX ADMIN — RIVAROXABAN 10 MG: 10 TABLET, FILM COATED ORAL at 04:11

## 2022-01-01 RX ADMIN — AMIODARONE HYDROCHLORIDE 0.5 MG/MIN: 1.8 INJECTION, SOLUTION INTRAVENOUS at 09:11

## 2022-01-01 RX ADMIN — ENOXAPARIN SODIUM 30 MG: 30 INJECTION SUBCUTANEOUS at 03:11

## 2022-01-01 RX ADMIN — NIFEDIPINE 60 MG: 60 TABLET, FILM COATED, EXTENDED RELEASE ORAL at 08:11

## 2022-01-01 RX ADMIN — RIVAROXABAN 10 MG: 10 TABLET, FILM COATED ORAL at 05:11

## 2022-01-01 RX ADMIN — PANTOPRAZOLE SODIUM 40 MG: 40 INJECTION, POWDER, FOR SOLUTION INTRAVENOUS at 08:11

## 2022-01-01 RX ADMIN — NOREPINEPHRINE BITARTRATE 8 MG: 8 INJECTION, SOLUTION INTRAVENOUS at 05:11

## 2022-01-01 RX ADMIN — NOREPINEPHRINE BITARTRATE 1.4 MCG/KG/MIN: 1 INJECTION, SOLUTION, CONCENTRATE INTRAVENOUS at 02:11

## 2022-01-01 RX ADMIN — SODIUM CHLORIDE, POTASSIUM CHLORIDE, SODIUM LACTATE AND CALCIUM CHLORIDE 1000 ML: 600; 310; 30; 20 INJECTION, SOLUTION INTRAVENOUS at 05:11

## 2022-01-01 RX ADMIN — SODIUM CHLORIDE 100 MG: 9 INJECTION, SOLUTION INTRAVENOUS at 06:11

## 2022-01-01 RX ADMIN — DEXMEDETOMIDINE HYDROCHLORIDE 0.4 MCG/KG/HR: 400 INJECTION INTRAVENOUS at 07:11

## 2022-01-01 RX ADMIN — Medication: at 11:11

## 2022-01-01 RX ADMIN — ATORVASTATIN CALCIUM 40 MG: 40 TABLET, FILM COATED ORAL at 09:11

## 2022-01-01 RX ADMIN — POTASSIUM CHLORIDE 40 MEQ: 14.9 INJECTION, SOLUTION INTRAVENOUS at 11:11

## 2022-01-01 RX ADMIN — ACETAMINOPHEN 650 MG: 325 TABLET, FILM COATED ORAL at 09:11

## 2022-01-01 RX ADMIN — SODIUM CHLORIDE 100 MG: 9 INJECTION, SOLUTION INTRAVENOUS at 09:11

## 2022-01-01 RX ADMIN — SODIUM CHLORIDE 1000 ML: 9 INJECTION, SOLUTION INTRAVENOUS at 05:11

## 2022-01-01 RX ADMIN — POTASSIUM CHLORIDE 75 ML/HR: 149 INJECTION, SOLUTION, CONCENTRATE INTRAVENOUS at 10:11

## 2022-01-01 RX ADMIN — LAMOTRIGINE 200 MG: 100 TABLET ORAL at 09:11

## 2022-01-01 RX ADMIN — ASPIRIN 81 MG: 81 TABLET, COATED ORAL at 08:11

## 2022-01-01 RX ADMIN — OXACILLIN SODIUM 2 G: 2 INJECTION, POWDER, FOR SOLUTION INTRAMUSCULAR; INTRAVENOUS at 11:11

## 2022-01-01 RX ADMIN — POTASSIUM CHLORIDE 75 ML/HR: 149 INJECTION, SOLUTION, CONCENTRATE INTRAVENOUS at 11:11

## 2022-01-01 RX ADMIN — IPRATROPIUM BROMIDE AND ALBUTEROL SULFATE 3 ML: 2.5; .5 SOLUTION RESPIRATORY (INHALATION) at 02:11

## 2022-01-01 RX ADMIN — BISACODYL 10 MG: 10 SUPPOSITORY RECTAL at 08:11

## 2022-01-01 RX ADMIN — SODIUM CHLORIDE 100 MG: 9 INJECTION, SOLUTION INTRAVENOUS at 04:11

## 2022-01-01 RX ADMIN — CARBIDOPA AND LEVODOPA 1 TABLET: 10; 100 TABLET ORAL at 03:11

## 2022-01-01 RX ADMIN — Medication: at 12:11

## 2022-01-01 RX ADMIN — HYDROCODONE BITARTRATE AND ACETAMINOPHEN 1 TABLET: 10; 325 TABLET ORAL at 04:11

## 2022-01-01 RX ADMIN — ACETAMINOPHEN 650 MG: 325 TABLET, FILM COATED ORAL at 03:11

## 2022-01-01 RX ADMIN — SODIUM CHLORIDE 100 MG: 9 INJECTION, SOLUTION INTRAVENOUS at 08:11

## 2022-01-01 RX ADMIN — MORPHINE SULFATE 2 MG: 4 INJECTION INTRAVENOUS at 10:11

## 2022-01-01 RX ADMIN — DEXMEDETOMIDINE HYDROCHLORIDE 0.4 MCG/KG/HR: 400 INJECTION INTRAVENOUS at 10:11

## 2022-01-01 RX ADMIN — HYDROCORTISONE SODIUM SUCCINATE 100 MG: 100 INJECTION, POWDER, FOR SOLUTION INTRAMUSCULAR; INTRAVENOUS at 05:11

## 2022-01-01 RX ADMIN — ACETAMINOPHEN 650 MG: 325 TABLET, FILM COATED ORAL at 02:11

## 2022-01-01 RX ADMIN — BISACODYL 10 MG: 10 SUPPOSITORY RECTAL at 11:11

## 2022-01-01 RX ADMIN — ENOXAPARIN SODIUM 30 MG: 30 INJECTION SUBCUTANEOUS at 04:11

## 2022-01-01 RX ADMIN — NOREPINEPHRINE BITARTRATE 0.14 MCG/KG/MIN: 1 INJECTION, SOLUTION, CONCENTRATE INTRAVENOUS at 08:11

## 2022-01-01 RX ADMIN — FUROSEMIDE 40 MG: 40 TABLET ORAL at 08:11

## 2022-01-01 RX ADMIN — IPRATROPIUM BROMIDE AND ALBUTEROL SULFATE 3 ML: 2.5; .5 SOLUTION RESPIRATORY (INHALATION) at 01:11

## 2022-01-01 RX ADMIN — HYDROCORTISONE SODIUM SUCCINATE 100 MG: 100 INJECTION, POWDER, FOR SOLUTION INTRAMUSCULAR; INTRAVENOUS at 10:11

## 2022-01-01 RX ADMIN — ENOXAPARIN SODIUM 30 MG: 30 INJECTION SUBCUTANEOUS at 09:11

## 2022-01-01 RX ADMIN — MAGNESIUM SULFATE IN DEXTROSE 1 G: 10 INJECTION, SOLUTION INTRAVENOUS at 11:11

## 2022-01-01 RX ADMIN — HYDROCORTISONE SODIUM SUCCINATE 100 MG: 100 INJECTION, POWDER, FOR SOLUTION INTRAMUSCULAR; INTRAVENOUS at 09:11

## 2022-01-01 RX ADMIN — OXACILLIN SODIUM 2 G: 2 INJECTION, POWDER, FOR SOLUTION INTRAMUSCULAR; INTRAVENOUS at 07:11

## 2022-01-01 RX ADMIN — NOREPINEPHRINE BITARTRATE 1 MCG/KG/MIN: 8 INJECTION, SOLUTION INTRAVENOUS at 08:11

## 2022-01-01 RX ADMIN — SODIUM CHLORIDE: 9 INJECTION, SOLUTION INTRAVENOUS at 04:11

## 2022-01-01 RX ADMIN — DEXMEDETOMIDINE HYDROCHLORIDE 0.2 MCG/KG/HR: 400 INJECTION INTRAVENOUS at 08:11

## 2022-01-01 RX ADMIN — OXACILLIN SODIUM 2 G: 2 INJECTION, POWDER, FOR SOLUTION INTRAMUSCULAR; INTRAVENOUS at 05:11

## 2022-01-01 RX ADMIN — NOREPINEPHRINE BITARTRATE 1.2 MCG/KG/MIN: 1 INJECTION, SOLUTION, CONCENTRATE INTRAVENOUS at 09:11

## 2022-01-01 RX ADMIN — VASOPRESSIN 0.04 UNITS/MIN: 20 INJECTION INTRAVENOUS at 05:11

## 2022-01-01 RX ADMIN — NOREPINEPHRINE BITARTRATE 0.5 MCG/KG/MIN: 8 INJECTION, SOLUTION INTRAVENOUS at 06:11

## 2022-01-01 RX ADMIN — HYDROCORTISONE SODIUM SUCCINATE 100 MG: 100 INJECTION, POWDER, FOR SOLUTION INTRAMUSCULAR; INTRAVENOUS at 03:11

## 2022-01-01 RX ADMIN — CALCIUM GLUCONATE 1 G: 20 INJECTION, SOLUTION INTRAVENOUS at 02:11

## 2022-01-01 RX ADMIN — CARBIDOPA AND LEVODOPA 1 TABLET: 10; 100 TABLET ORAL at 04:11

## 2022-01-01 RX ADMIN — INSULIN HUMAN 10 UNITS: 100 INJECTION, SOLUTION PARENTERAL at 04:11

## 2022-01-01 RX ADMIN — FLUTICASONE PROPIONATE 50 MCG: 50 SPRAY, METERED NASAL at 09:11

## 2022-01-01 RX ADMIN — OXACILLIN SODIUM 2 G: 2 INJECTION, POWDER, FOR SOLUTION INTRAMUSCULAR; INTRAVENOUS at 03:11

## 2022-01-01 RX ADMIN — HYDROCODONE BITARTRATE AND ACETAMINOPHEN 1 TABLET: 10; 325 TABLET ORAL at 06:11

## 2022-01-01 RX ADMIN — AMIODARONE HYDROCHLORIDE 150 MG: 1.5 INJECTION, SOLUTION INTRAVENOUS at 02:11

## 2022-01-01 RX ADMIN — POTASSIUM CHLORIDE 75 ML/HR: 149 INJECTION, SOLUTION, CONCENTRATE INTRAVENOUS at 02:11

## 2022-01-01 RX ADMIN — POTASSIUM CHLORIDE 75 ML/HR: 149 INJECTION, SOLUTION, CONCENTRATE INTRAVENOUS at 04:11

## 2022-01-01 RX ADMIN — ENOXAPARIN SODIUM 40 MG: 40 INJECTION SUBCUTANEOUS at 04:11

## 2022-01-01 RX ADMIN — AMIODARONE HYDROCHLORIDE 1 MG/MIN: 1.8 INJECTION, SOLUTION INTRAVENOUS at 03:11

## 2022-01-01 RX ADMIN — IPRATROPIUM BROMIDE AND ALBUTEROL SULFATE 3 ML: 2.5; .5 SOLUTION RESPIRATORY (INHALATION) at 12:11

## 2022-01-01 RX ADMIN — CLONIDINE HYDROCHLORIDE 0.1 MG: 0.1 TABLET ORAL at 04:11

## 2022-07-28 NOTE — PROGRESS NOTES
Subjective:       Patient ID: Tim Meneses is a 76 y.o. male.    Chief Complaint: Seizures (C/o Sz on 03/24, 03/31, 06/17.  2 falls; had CT 07/15/2022 (report in chart))    HPI 3 small sz since last seen  On vimpat 100 mg bid; lamictal 200 am/300 qhs  Satisfied  Does not drive  Fell twice; scalp hematoma; resolved  occ dizziness after taking meds with assoc diplopia; resolves after a while  bp meds have been adjusted  Review of Systems    The remainder of the 14 system ROS is noncontributory or negative unless mentioned/reviewed above.    Objective:      Physical Exam  Mental Status: Alert and oriented x3. Language is fluent with good comprehension.    Cranial Nerve: Pupils are equal, round, and reactive to light. Visual fields are intact to confrontation. Normal fundi. Ocular movements are intact. Face is symmetric at rest and with activation with intact sensation throughout. Hearing intact to finger rub bilaterally. Muscles of tongue and palate activate symmetrically. No dysarthria. Strength is full in sternocleidomastoid and trapezius bilaterally.    Motor: Muscle bulk and tone are normal. Strength is 5/5 in all four extremities both proximally and distally. Intact fine motor movements bilaterally. There is no pronator drift or satelliting on arm roll.    Sensory: Sensation is intact to light touch, pinprick, vibration, and proprioception throughout. Romberg is negative.    Reflexes: 2+ and symmetric at the biceps, triceps, brachioradialis, patella, and Achilles bilaterally. Plantar response is flexor bilaterally.    Coordination: No dysmetria on finger-nose-finger or heel-knee-shin. Normal rapid alternating movements. Fast finger tapping with normal amplitude and speed.    Gait: Narrow based with normal stride length and good arm swing bilaterally. Able to walk on heels, toes, and in tandem.    Assessment:       Problem List Items Addressed This Visit    None         Plan:       Epilepsy  See meds/rx   vimpat pt  assist program; rx written and postdated but not sent until needed

## 2022-08-11 NOTE — TELEPHONE ENCOUNTER
States pt needs a new prescription of Vimpat sent to the [facility]. Per [facility] note pt is on Vimpat assist program.     Medication:Vimpat 100 mg    Pharmacy: none  States pt gets this sent to [facility].    Last Appointment: 07/28/22    Next Appointment: 01/25/23

## 2022-08-12 NOTE — TELEPHONE ENCOUNTER
[id number]  Ph [phone]  F [phone]    January 2022's scanned into chart

## 2022-08-16 NOTE — TELEPHONE ENCOUNTER
States following up on call from 08/11/22. Notified Nasreen is working on this, but no update yet. States she spoke to [facility] 08/15/22 and they states they will be contacting office.     Medication:Vimpat 100 mg     Pharmacy: none  States pt gets this sent to [facility].     Last Appointment: 07/28/22     Next Appointment: 01/25/23

## 2022-08-16 NOTE — TELEPHONE ENCOUNTER
Pt is in need of a new Rx for Vimpat 100mg BID [facility]

## 2022-10-18 NOTE — TELEPHONE ENCOUNTER
Rtn call  Has been on both Vimpat and lacosamide since 2018, unlikely the cause of dizziness.  Has rx of meclizine and had been taking at the same time.  Inst possibly the cause.  Asked that they bring all medications to appt 10/25/22  Verbalized understanding.

## 2022-10-18 NOTE — TELEPHONE ENCOUNTER
Reports after pt takes these medications along with others he loses his balance and is not able to walk properly.   States this happens in the morning and the evening.   State symptoms started three weeks ago.       Medication: Vimpat 100 mg, Lamictal 200 mg  When I asked about other medications Laura states clinic should have these medication listed already.     Pharmacy: [facility] [place]    Last Appointment: 7/28/22    Next Appointment: 10/25/22

## 2022-10-24 PROBLEM — G40.019 INTRACTABLE LOCALIZATION-RELATED EPILEPSY: Status: ACTIVE | Noted: 2022-01-01

## 2022-10-24 PROBLEM — G40.019 INTRACTABLE LOCALIZATION-RELATED EPILEPSY: Chronic | Status: ACTIVE | Noted: 2022-01-01

## 2022-10-24 NOTE — PROGRESS NOTES
Subjective:       Patient ID: Tim Meneses is a 77 y.o. male.    Chief Complaint: Seizures and Medication Problem (Lamictal and Vimpat dose increase @ [facility].  C/o frequent falls w/head injury, dizziness, double vision.  Rx'd Aricept/Antivert by [place] - pt stopped antivert.  )    HPI   Called on 10/18 reporting imbalance/not able to walk properly  PCP started Aricept 10, celexa 20 & antivert 25 tid on 9/22/22.  His imbalance, falls & vertical diplopia has gotten progressively worse since then.    He is now ambulating with a walker  He had a brief seizure while in bed a few weeks ago  +fatigued/poor stamina    Received his flu shot on 10/8  His vimpat & lamictal doses were not changed at his last appt here.      Here with his daughter today  Reports generalized deconditioning    Have stopped antivert  Still taking aricept & celexa    Review of Systems    The remainder of the 14 system ROS is noncontributory or negative unless mentioned/reviewed above.    Objective:      Physical Exam   GENERAL: NAD, calm, cooperative, appropriate  Poor stamina  Looks fatigued & deconditioned  Awake/alert  Well groomed  RESP: mild audible wheeze  HEART: RRR  No LE edema  MENTAL STATUS: oriented, follow commands reliably  SPEECH/LANGUAGE: clear, fluent  CN:  Perrla, eomi, vff, gaze conjugate, no nystagmus  No tactile or motor facial asymmetry  Tongue protrudes midline  Motor: mild generalized weakness  Cerebellar: no no head tremor, mild  +romberg  Impaired tandem  Sensory: normal to tactile stim/vibration  DTRs: normal +2, symmetric  Gait: slow, shuffling, multi-point turn; with anne John np, certify that dr. Del george the supervising/rendering physician is physically present in the office at the time of the visit    Assessment:       Problem List Items Addressed This Visit          Neuro    Intractable localization-related epilepsy - Primary (Chronic)     Other Visit Diagnoses       Falls frequently        Relevant  Orders    Ambulatory referral/consult to Home Health    Romberg's test positive        Imbalance        Relevant Orders    Ambulatory referral/consult to Home Health    Vertical diplopia        Physical deconditioning        Relevant Orders    Ambulatory referral/consult to Home Health            Plan:       Cont vimpat 100 bid & lamictal 200/300  Stop aricept  Stop meclizine  Reduce celexa to 10mg for a week then stop  HH for PT/OT ([facility] requested)  F/u 1 wk

## 2022-10-25 NOTE — PATIENT INSTRUCTIONS
"Patient Education       Seizures   The Basics   Written by the doctors and editors at [facility]   What are seizures? -- Seizures are waves of abnormal electrical activity in the brain. Seizures can make you pass out, or move or behave strangely. Most seizures last only a few seconds or minutes.  Epilepsy is a condition that causes people to have repeated seizures. But not everyone who has had a seizure has epilepsy. Problems such as low blood sugar or infection can also cause seizures. Other problems such as anxiety or fainting spells can cause events that look like seizures.  What are the symptoms of a seizure? -- There are different kinds of seizures. Each causes a different set of symptoms.  People who have "tonic clonic" or "grand mal" seizures often get stiff and then have jerking movements. People who have other types of seizures have less dramatic changes. For instance, some people have shaking movements in just 1 arm or in a part of their face. Other people suddenly stop responding and stare for a few seconds.  Should I see a doctor or nurse if I have a seizure? -- If you have never had a seizure before and you have one, you (or whoever is with you) should call for an ambulance (in the [place] and [place], dial 9-1-1). Having a seizure can be a sign that something is wrong with your brain.  How are seizures treated? -- The right treatment for seizures depends on what is causing them. If you have seizures because of an infection, you will probably need treatments to get rid of the infection. On the other hand, if you have repeated seizures because of epilepsy, you will probably need anti-seizure medicines, also called "anti-convulsants."  People sometimes need to try different medicines before they find a treatment that works well. Seizures can be hard to control. But if you work with your doctor, chances are good that you will find a treatment that works.  Do anti-seizure medicines cause side effects? -- Yes. " "Anti-seizure medicines can cause side effects. They can make you feel tired or clumsy, or cause other problems. If you are bothered by side effects, tell your doctor about it. They can work with you to find the medicine or dose that causes the fewest problems. Most of the side effects from these medicines are mild. But there are two side effects that are very serious but rare:  Anti-seizure medicines can cause a rare but serious skin rash. Tell your doctor or nurse right away if you notice a new rash while taking an anti-seizure medicine.  Anti-seizure medicines can increase the risk of becoming suicidal (wanting to kill yourself). Tell your doctor or nurse right away if you start to feel depressed or have thoughts of harming yourself.  What if anti-seizure medicines do not work for me? -- If you keep having seizures even after trying different medicines, you might have other options. Some people can have surgery to remove the small part of their brain that is causing seizures. Others get a device called a "vagus nerve stimulator" put in their chest to help control seizures.  A special diet, called the "ketogenic diet," might be helpful for some people. This diet involves eating foods that are high in fat but avoiding carbohydrates. If you are interested in trying this kind of diet, your doctor or nurse can talk to you about how to do this safely.   What can I do to keep myself safe? -- Until you have your seizures under control, do not drive. The laws that say when a person with seizures can drive are different depending on where the person lives. Ask your doctor if you can safely drive and about the laws where you live.  Also, if your seizures are not under control, make sure to take other safety steps. For example, do not swim without someone else nearby who could help you if you started having a seizure. And avoid activities that could result in you falling from a height.  How can I reduce my chances of having " more seizures? -- You can:  Take your medicines exactly as directed - at the right times, and at the right doses.  Tell your doctor about any side effects you have. That way you can work together to find the best medicine for you.  Be careful not to let your prescription run out. (Stopping anti-seizure medicine suddenly can put you at risk of seizures.)  While on anti-seizure medicines, check with your doctor before starting any new medicines. Anti-seizure medicines can interact with prescription and non-prescription medicines, and with herbal drugs. Mixing them can increase side effects or make them not work as well.  Avoid alcohol. Alcohol can increase the risk of seizures, affect the way seizure medicines work, and increase side effects from anti-seizure medicines.  What should other people do if they see me having a seizure? -- Ask your doctor what your family members, friends, or coworkers should do if you have a seizure. Some people will have seizures from time to time, and they might not need to see a doctor every time. But if you have a seizure that lasts longer than 5 minutes or if you do not wake up after a seizure, someone should call for an ambulance (in the [place] and [place], dial 9-1-1).  Other people should not try to put anything in your mouth while you are having a seizure. But they should make sure you do not bang against any hard surfaces.  What if I want to get pregnant? -- If you take anti-seizure medicines, speak to your doctor or nurse before you start trying to get pregnant. Some anti-seizure medicines can hurt an unborn baby. You might need to switch medicines before you get pregnant.  All topics are updated as new evidence becomes available and our peer review process is complete.  This topic retrieved from [employer] on: Sep 21, 2021.  Topic 83685 Version 17.0  Release: 29.4.2 - C29.263  © 2021 UpToDate, Inc. and/or its affiliates. All rights reserved.  Consumer Information Use and Disclaimer    This information is not specific medical advice and does not replace information you receive from your health care provider. This is only a brief summary of general information. It does NOT include all information about conditions, illnesses, injuries, tests, procedures, treatments, therapies, discharge instructions or life-style choices that may apply to you. You must talk with your health care provider for complete information about your health and treatment options. This information should not be used to decide whether or not to accept your health care provider's advice, instructions or recommendations. Only your health care provider has the knowledge and training to provide advice that is right for you. The use of this information is governed by the [employer] End User License Agreement, available at https://[email or web address] [email or web address]/[email or web address]/elissa.The use of [employer] content is governed by the [employer] Terms of Use. ©2021 UpToDate, Inc. All rights reserved.  Copyright   © 2021 UpToDate, Inc. and/or its affiliates. All rights reserved.

## 2022-11-01 PROBLEM — R53.81 PHYSICAL DECONDITIONING: Status: ACTIVE | Noted: 2022-01-01

## 2022-11-01 PROBLEM — R29.818 POSITIVE ROMBERG TEST: Status: ACTIVE | Noted: 2022-01-01

## 2022-11-01 PROBLEM — R29.6 FREQUENT FALLS: Status: ACTIVE | Noted: 2022-01-01

## 2022-11-01 PROBLEM — R26.89 IMBALANCE: Status: ACTIVE | Noted: 2022-01-01

## 2022-11-01 NOTE — PROGRESS NOTES
Subjective:       Patient ID: Tim Meneses is a 77 y.o. male.    Chief Complaint: Seizures (F/u for epilepsy)    HPI   OFF of aricept, antivert & celexa  On vimpat 100 bid & lamictal 300/200  HH is seeing him & doing therapy (Only had 1 visit from therapy)  He is a little better & his wife is already noticing an improvement  He has an appt w/ his eye MD next week    Had a seizure Monday night while sitting in the recliner; lasted a few minutes; post-ictal fatigue    No falls since I saw him last week  Feels like his R side is weak; he prev always feel to the R side    Review of Systems    The remainder of the 14 system ROS is noncontributory or negative unless mentioned/reviewed above.    Objective:      Physical Exam   GENERAL: NAD, calm, cooperative, appropriate  He looks better today; looks more steady when ambulating  Awake/alert  Well groomed  RESP: mild audible wheeze  HEART: RRR  No LE edema  MENTAL STATUS: oriented, follow commands reliably  SPEECH/LANGUAGE: clear, fluent  CN:  Perrla, eomi, vff, gaze conjugate, no nystagmus  No tactile or motor facial asymmetry  Tongue protrudes midline  Motor: mild generalized weakness  Cerebellar: no head tremor noted today  +romberg, mildly positive today - improved  Impaired tandem  Sensory: normal to tactile stim/vibration  DTRs: normal +2, symmetric  Gait: faster this week, no shuffling; using rollator    I, anne park np, certify that dr. Del george the supervising/rendering physician is physically present in the office at the time of the visit    Assessment:       Problem List Items Addressed This Visit          Neuro    Intractable localization-related epilepsy - Primary (Chronic)    Relevant Orders    Lamotrigine level    Lacosamide (Vimpat)    Positive Romberg test       Other    Imbalance    Frequent falls    Physical deconditioning     Other Visit Diagnoses       Therapeutic drug monitoring        Relevant Orders    Lamotrigine level    Lacosamide (Vimpat)             Plan:       Cont vimpat 100 bid & lamictal 300/200  HH for PT/OT ([facility] requested)  Ck vimpat & lamictal levels - will contact after results received - send orders to [facility] for nursing to draw; fax results to us after  See eye MD; update me after the appt  F/u 2mo

## 2022-11-02 NOTE — PATIENT INSTRUCTIONS
"Patient Education       Seizures   The Basics   Written by the doctors and editors at [facility]   What are seizures? -- Seizures are waves of abnormal electrical activity in the brain. Seizures can make you pass out, or move or behave strangely. Most seizures last only a few seconds or minutes.  Epilepsy is a condition that causes people to have repeated seizures. But not everyone who has had a seizure has epilepsy. Problems such as low blood sugar or infection can also cause seizures. Other problems such as anxiety or fainting spells can cause events that look like seizures.  What are the symptoms of a seizure? -- There are different kinds of seizures. Each causes a different set of symptoms.  People who have "tonic clonic" or "grand mal" seizures often get stiff and then have jerking movements. People who have other types of seizures have less dramatic changes. For instance, some people have shaking movements in just 1 arm or in a part of their face. Other people suddenly stop responding and stare for a few seconds.  Should I see a doctor or nurse if I have a seizure? -- If you have never had a seizure before and you have one, you (or whoever is with you) should call for an ambulance (in the [place] and [place], dial 9-1-1). Having a seizure can be a sign that something is wrong with your brain.  How are seizures treated? -- The right treatment for seizures depends on what is causing them. If you have seizures because of an infection, you will probably need treatments to get rid of the infection. On the other hand, if you have repeated seizures because of epilepsy, you will probably need anti-seizure medicines, also called "anti-convulsants."  People sometimes need to try different medicines before they find a treatment that works well. Seizures can be hard to control. But if you work with your doctor, chances are good that you will find a treatment that works.  Do anti-seizure medicines cause side effects? -- Yes. " "Anti-seizure medicines can cause side effects. They can make you feel tired or clumsy, or cause other problems. If you are bothered by side effects, tell your doctor about it. They can work with you to find the medicine or dose that causes the fewest problems. Most of the side effects from these medicines are mild. But there are two side effects that are very serious but rare:  Anti-seizure medicines can cause a rare but serious skin rash. Tell your doctor or nurse right away if you notice a new rash while taking an anti-seizure medicine.  Anti-seizure medicines can increase the risk of becoming suicidal (wanting to kill yourself). Tell your doctor or nurse right away if you start to feel depressed or have thoughts of harming yourself.  What if anti-seizure medicines do not work for me? -- If you keep having seizures even after trying different medicines, you might have other options. Some people can have surgery to remove the small part of their brain that is causing seizures. Others get a device called a "vagus nerve stimulator" put in their chest to help control seizures.  A special diet, called the "ketogenic diet," might be helpful for some people. This diet involves eating foods that are high in fat but avoiding carbohydrates. If you are interested in trying this kind of diet, your doctor or nurse can talk to you about how to do this safely.   What can I do to keep myself safe? -- Until you have your seizures under control, do not drive. The laws that say when a person with seizures can drive are different depending on where the person lives. Ask your doctor if you can safely drive and about the laws where you live.  Also, if your seizures are not under control, make sure to take other safety steps. For example, do not swim without someone else nearby who could help you if you started having a seizure. And avoid activities that could result in you falling from a height.  How can I reduce my chances of having " more seizures? -- You can:  Take your medicines exactly as directed - at the right times, and at the right doses.  Tell your doctor about any side effects you have. That way you can work together to find the best medicine for you.  Be careful not to let your prescription run out. (Stopping anti-seizure medicine suddenly can put you at risk of seizures.)  While on anti-seizure medicines, check with your doctor before starting any new medicines. Anti-seizure medicines can interact with prescription and non-prescription medicines, and with herbal drugs. Mixing them can increase side effects or make them not work as well.  Avoid alcohol. Alcohol can increase the risk of seizures, affect the way seizure medicines work, and increase side effects from anti-seizure medicines.  What should other people do if they see me having a seizure? -- Ask your doctor what your family members, friends, or coworkers should do if you have a seizure. Some people will have seizures from time to time, and they might not need to see a doctor every time. But if you have a seizure that lasts longer than 5 minutes or if you do not wake up after a seizure, someone should call for an ambulance (in the [place] and [place], dial 9-1-1).  Other people should not try to put anything in your mouth while you are having a seizure. But they should make sure you do not bang against any hard surfaces.  What if I want to get pregnant? -- If you take anti-seizure medicines, speak to your doctor or nurse before you start trying to get pregnant. Some anti-seizure medicines can hurt an unborn baby. You might need to switch medicines before you get pregnant.  All topics are updated as new evidence becomes available and our peer review process is complete.  This topic retrieved from [employer] on: Sep 21, 2021.  Topic 39785 Version 17.0  Release: 29.4.2 - C29.263  © 2021 UpToDate, Inc. and/or its affiliates. All rights reserved.  Consumer Information Use and Disclaimer    This information is not specific medical advice and does not replace information you receive from your health care provider. This is only a brief summary of general information. It does NOT include all information about conditions, illnesses, injuries, tests, procedures, treatments, therapies, discharge instructions or life-style choices that may apply to you. You must talk with your health care provider for complete information about your health and treatment options. This information should not be used to decide whether or not to accept your health care provider's advice, instructions or recommendations. Only your health care provider has the knowledge and training to provide advice that is right for you. The use of this information is governed by the [employer] End User License Agreement, available at https://[email or web address]/en/solutions/Markado/about/[email or web address].The use of [employer] content is governed by the [employer] Terms of Use. ©2021 UpToDate, Inc. All rights reserved.  Copyright   © 2021 UpToDate, Inc. and/or its affiliates. All rights reserved.

## 2022-11-07 NOTE — TELEPHONE ENCOUNTER
Rtn call to Mrs Nava  States pt had multiple falls this weekend.  States brought pt in today, fracture was found (of neck).  States medications had been reduced recently.  Confirmed pt had been doing a little better w changes, but states he began to get worse this weekend.  States being admitted to [facility] and Neurology has been consulted.  Advised would update Chelsi and Dr Murguia of fracture and admit.

## 2022-11-07 NOTE — ED PROVIDER NOTES
Encounter Date: 11/7/2022       History     Chief Complaint   Patient presents with    Fall     Pt brought in by . Report 8 falls this weekend. Pt has no complaints except dizziness. No blood thinners, intermittent confusion.  nurse states he has had R sided weakness for several months but worsening since Fri. Intermittent slurred speech. Pt awake and orientedx3 at this time. CATALINA cruz     78 yo male with PMH of seizure d/o, HTN, recurrent bradycardia s/p pacemaker placement and COPD presents to the ED accompanied by his daughter for recurrent falls. Daughter states that the patient has fallen at least 7 times over the last 2-3 days. Pt complains of double vision and states his body is pulling him towards the right and that he always falls to the right. Yesterday pt felt dizzy. No LOC. Pt also c/o SOB, which is chronic.   No GI sx, no hematemesis, melena or hematochezia. No CP.   Per daughter, the patient has been evaluated by his PCP, cardiologist and neurologist (Dr. Murguia), and was thought that his falls are caused by medication side effect and so recent adjustments have been made.     The history is provided by the patient.   Fall  The accident occurred several days ago. The fall occurred while walking. He fell from a height of 1 to 2 ft. The pain is at a severity of 0/10. He was Ambulatory at the scene. Pertinent negatives include no fever, no numbness, no abdominal pain, no nausea and no vomiting.   Review of patient's allergies indicates:  No Known Allergies  Past Medical History:   Diagnosis Date    COPD (chronic obstructive pulmonary disease)     Hepatitis     Hypercholesterolemia     Hypertension     Malnutrition related to chronic disease     Seizure     Subacromial impingement of right shoulder     Ventricular tachycardia      Past Surgical History:   Procedure Laterality Date    APPENDECTOMY      CARDIAC PACEMAKER PLACEMENT       No family history on file.  Social History     Tobacco Use    Smoking  status: Every Day     Packs/day: 1.00     Types: Cigarettes     Start date: 1961    Smokeless tobacco: Never   Substance Use Topics    Alcohol use: Yes     Comment: 1-2 times per year    Drug use: Never     Review of Systems   Constitutional:  Positive for appetite change. Negative for chills and fever.   HENT:  Negative for ear pain, hearing loss and tinnitus.    Respiratory:  Positive for shortness of breath.    Cardiovascular:  Negative for chest pain, palpitations and leg swelling.   Gastrointestinal:  Negative for abdominal pain, constipation, diarrhea, nausea and vomiting.   Genitourinary:  Negative for difficulty urinating and dysuria.   Neurological:  Positive for dizziness. Negative for facial asymmetry, speech difficulty, light-headedness and numbness.        + falls   Psychiatric/Behavioral:  The patient is nervous/anxious.      Physical Exam     Initial Vitals [11/07/22 1004]   BP Pulse Resp Temp SpO2   116/70 65 18 97.2 °F (36.2 °C) 98 %      MAP       --         Physical Exam    Nursing note and vitals reviewed.  Constitutional: He appears ill. No distress.   HENT:   Head: Atraumatic.   Eyes: Conjunctivae are normal. Pupils are equal, round, and reactive to light. Right eye exhibits nystagmus. Left eye exhibits nystagmus.   Cardiovascular:  Normal rate, regular rhythm and normal heart sounds.           Pulmonary/Chest: He has wheezes (diffuse).   Abdominal: Abdomen is soft and flat. He exhibits no distension. There is no abdominal tenderness.   Musculoskeletal:      Comments: No edema     Neurological: He is alert and oriented to person, place, and time. He has normal strength. No sensory deficit. GCS eye subscore is 4. GCS verbal subscore is 5. GCS motor subscore is 6.   Finger to nose normal  Visual fields intact       ED Course   Procedures  Labs Reviewed   COMPREHENSIVE METABOLIC PANEL - Abnormal; Notable for the following components:       Result Value    Potassium Level 2.8 (*)     Glucose Level  120 (*)     Blood Urea Nitrogen 26.5 (*)     Creatinine 2.71 (*)     Calcium Level Total 8.6 (*)     Protein Total 5.5 (*)     Albumin Level 2.8 (*)     Albumin/Globulin Ratio 1.0 (*)     All other components within normal limits   CBC WITH DIFFERENTIAL - Abnormal; Notable for the following components:    RBC 3.99 (*)     Hgb 11.3 (*)     Hct 34.6 (*)     MCHC 32.7 (*)     All other components within normal limits   TROPONIN I - Normal   MAGNESIUM - Normal   CBC W/ AUTO DIFFERENTIAL    Narrative:     The following orders were created for panel order CBC auto differential.  Procedure                               Abnormality         Status                     ---------                               -----------         ------                     CBC with Differential[[id number]]        Abnormal            Final result                 Please view results for these tests on the individual orders.   URINALYSIS, REFLEX TO URINE CULTURE     EKG Readings: (Independently Interpreted)   Initial Reading: No STEMI. Rhythm: Paced Rhythm. Heart Rate: 68. Ectopy: PVCs.   Paced rhythm  Baseline artifact, some ST segment changes in the anterolateral and inferior leads, no ST elevation   ECG Results              EKG 12-lead (Final result)  Result time 11/07/22 11:15:57      Final result by Interface, Lab In [facility] (11/07/22 11:15:57)                   Narrative:    Test Reason : R42,    Vent. Rate : 068 BPM     Atrial Rate : 069 BPM     P-R Int : 176 ms          QRS Dur : 078 ms      QT Int : 404 ms       P-R-T Axes : -03 055 -62 degrees     QTc Int : 429 ms    Atrial-paced rhythm with occasional ventricular-paced complexes  Baseline Artifact  ST and T wave abnormality, consider inferior ischemia  ST and T wave abnormality, consider anterolateral ischemia  Abnormal ECG  No previous ECGs available  Confirmed by Josh Hatfield MD ([id number]) on 11/7/2022 11:15:44 AM    Referred By:             Confirmed By:Josh Hatfield MD                                   Imaging Results              CT Head Without Contrast (Final result)  Result time 11/07/22 10:57:24      Final result by Marcos Pina MD (11/07/22 10:57:24)                   Impression:      No acute intracranial abnormality identified.  Findings of chronic microvascular ischemic disease.      Electronically signed by: Marcos Pina  Date:    11/07/2022  Time:    10:57               Narrative:    EXAMINATION:  CT HEAD WITHOUT CONTRAST    CLINICAL HISTORY:  Head trauma, minor (Age >= 65y);    TECHNIQUE:  Low dose axial images were obtained through the head.  Coronal and sagittal reformations were also performed. Contrast was not administered.    Automatic exposure control was utilized to reduce the patient's radiation dose.    DLP= 1304    COMPARISON:  None.    FINDINGS:  No acute intracranial hemorrhage, edema or mass. No acute parenchymal abnormality.    Diffuse cerebral atrophy with concordant ventricular enlargement    Scattered hypodensities throughout the deep periventricular white matter.    The osseous structures are normal.    The mastoid air cells are clear.    Debris noted in the right auditory canal.    The globes and orbital contents are normal bilaterally.    Mucous retention cyst within the right maxillary sinus.                                        CT Cervical Spine Without Contrast (Final result)  Result time 11/07/22 10:46:51      Final result by Kirk Tavarez MD (11/07/22 10:46:51)                   Impression:      Fracture along the anterior inferior aspect of the C4 vertebral body at the base of the anterior bridging osteophyte with no significant displacement seen.    Torticollis to the right most likely related to spasm    Degenerative changes seen throughout the cervical spine    This report was flagged in Epic as abnormal.      Electronically signed by: Kirk Tavarez  Date:    11/07/2022  Time:    10:46               Narrative:    EXAMINATION:  CT  CERVICAL SPINE WITHOUT CONTRAST    CLINICAL HISTORY:  Neck trauma (Age >= 65y);    TECHNIQUE:  Low dose axial images, sagittal and coronal reformations were performed though the cervical spine.  Contrast was not administered. Automatic exposure control is utilized to reduce patient radiation exposure.    COMPARISON:  04/28/2018    FINDINGS:  There are osteoporotic and degenerative changes seen throughout the cervical spine.  There is a fracture along the anterior inferior aspect of the C4 vertebral body at the base of the anterior bridging osteophyte.  No significant displacement is seen.  There is another anterior bridging osteophyte seen at C5-C6 and C6-C7 with no acute fracture seen in that region.  The odontoid lateral masses appear grossly unremarkable.  There is some torticollis to the right most likely related to spasm.  Multilevel facet arthropathy is seen.  Some areas of heterotrophic bone formation seen along the posterior aspect of vertebral body at C5-C6 adjacent to the spinous process.  No spinous process fracture is seen.                                       X-Ray Chest 1 View (Final result)  Result time 11/07/22 10:31:04      Final result by Marcos Pina MD (11/07/22 10:31:04)                   Impression:      No acute cardiopulmonary process.      Electronically signed by: Marcos Pina  Date:    11/07/2022  Time:    10:31               Narrative:    EXAMINATION:  XR CHEST 1 VIEW    CLINICAL HISTORY:  Weakness    TECHNIQUE:  Single view of the chest    COMPARISON:  12/13/2017    FINDINGS:  No focal opacification, pleural effusion, or pneumothorax.    The cardiomediastinal silhouette is within normal limits.    Left-sided cardiac device is noted.    No acute osseous abnormality.                                       Medications   potassium chloride 20 mEq in 100 mL IVPB (FOR CENTRAL LINE ADMINISTRATION ONLY) (40 mEq Intravenous New Bag 11/7/22 1152)   albuterol-ipratropium 2.5 mg-0.5 mg/3 mL  nebulizer solution 3 mL (3 mLs Nebulization Given 11/7/22 1251)   lactated ringers bolus 1,000 mL (1,000 mLs Intravenous New Bag 11/7/22 1200)     Medical Decision Making:   ED Management:  CT head  Cardiac workup  CBC, CMP              Attending Attestation:   Physician Attestation Statement for Resident:  As the supervising MD   Physician Attestation Statement: I have personally seen and examined this patient.   I agree with the above history.  -: I had face-to-face time with this patient, performed my own independent history and physical examination of the patient. I reviewed the results, discussed the MDM and formulated the substantive portion of the plan of care with the resident.    Briefly, this 77-year-old male who presented to the emergency department for evaluation of multiple falls over the last several days.  He struck his head and multiple of these falls, unclear if she lost consciousness.  He is not on any anticoagulant medications.  Reports that he feels like he is being pulled to 1 side when trying to ambulate thus leading to the falls.  He denies any chest pain but reports shortness of breath.       As the supervising MD I agree with the above PE.   -: On my examination, he is afebrile, chronically ill appearing, hemodynamically stable.  He does have some horizontal beating nystagmus with position changes.  No gross visual field cut.  Face is symmetric with no droop, no peripheral extremity drift, sensation intact to light touch throughout.  Regular rate and rhythm, mild expiratory wheezing.   As the supervising MD I agree with the above treatment, course, plan, and disposition.   -: ED workup undertaken both for evidence of injury as well as for etiology of his recurrent falls.  CT of the head demonstrates no acute intracranial process; however, CT of the neck does demonstrate a C4 vertebral body fracture.  Case was discussed with neurosurgery who reviewed the images and stated in isolation, could  be placed in a collar and follow-up as outpatient, no indication for admission for surgical intervention at this time.  Laboratory studies notable for an elevated BUN and creatinine as well as hypokalemia.  He will need to be admitted to the medicine service for further inpatient fluids, electrolyte correction, and workup of his multiple falls.  Neurosurgery has agreed to see in consultation while he is admitted.  Plan for MR imaging of both the brain and cervical spine during this admission.   I have reviewed and agree with the residents interpretation of the following: lab data, x-rays, CT scans and EKG.               ED Course as of 11/07/22 1339   Mon Nov 07, 2022   1137 CT notable for fracture along the anterior inferior aspect of the C4 vertebral body at the base of the anterior bridging osteophyte with no significant displacement seen. Will discuss w/ neurosurgery [KS]   1148 Discussed C-spine fracture with neurosurgery PA on-call.  Recommends cervical collar immobilization, MRI and could otherwise follow-up as an outpatient; however, if admitted for medical reasons, they will see in consultation if needed by the medicine team. [KS]   1339 Spoke with hospitalist, pt will be admitted to Dr. Mayer [LL]      ED Course User Index  [KS] Nasreen Vargas MD  [LL] Loretta Heni MD                 Clinical Impression:   Final diagnoses:  [R42] Dizziness  [R53.1] Generalized weakness  [S12.300A] Closed fracture of fourth cervical vertebra without spinal cord injury, initial encounter  [R29.6] Falls frequently  [N17.9] GIUSEPPE (acute kidney injury) (Primary)  [E87.6] Hypokalemia        ED Disposition Condition    Admit Stable                Loretta Hein MD  Resident  11/07/22 1340       Nasreen Vargas MD  11/12/22 1154

## 2022-11-07 NOTE — TELEPHONE ENCOUNTER
Reports pt went to hospital this morning 11/7/22 due to having a few falls and hitting his head.   Report CT scan showed pt has a cracked neck and pt is currently being evaluated.  Is asking for a callback from SAURAV Martin.   Notified NP in clinic and of clinic hours.     LOV: 11/2/22    NOV: 12/20/22

## 2022-11-07 NOTE — CONSULTS
"[facility] - Emergency Dept  Neurosurgery  Consult Note    Inpatient consult to Neurosurgery  Consult performed by: MODESTO Salazar  Consult ordered by: Nasreen Vargas MD  Reason for consult: C spine fx      Subjective:     Chief Complaint/Reason for Admission: Multiple falls    History of Present Illness: Patient is a 76 yo male with PMH of seizure d/o, HTN, recurrent bradycardia s/p pacemaker placement and COPD, who presents to the ED accompanied by his daughter for recurrent falls. Daughter states that the patient has fallen at least 7 times over the last 2-3 days. Pt complains of double vision and states his body is "pulling him towards the right and that he always falls to the right." Yesterday pt felt dizzy. No LOC. Pt also c/o SOB, which is chronic. Per daughter, the patient has been evaluated by his PCP, cardiologist and neurologist (Dr. Murguia), and was thought that his falls are caused by medication side effect and so recent adjustments have been made. She feels that his falls and unsteady gait have worsened following the medication adjustment.    CT of his head was negative for acute intracranial findings. His CT neck was significant for C4 vertebral body fx. Dr. Nathan has been consulted for evaluation and treatment recommendations.    On PE this am he is lying in bed, NAD. He currently denies neck pain but does have posterior neck discomfort d/t the rigid collar. He denies numbness and tingling in bilateral UE and LE. He states that he has double vision in both eyes that worsens when he stands. He does walk with a walker but has fallen regardless of his walker in the last few days. He denies HA, N/V and bladder and bowel dysfunction.     Review of patient's allergies indicates:  No Known Allergies    Past Medical History:   Diagnosis Date    COPD (chronic obstructive pulmonary disease)     Hepatitis     Hypercholesterolemia     Hypertension     Malnutrition related to chronic " disease     Seizure     Subacromial impingement of right shoulder     Ventricular tachycardia      Past Surgical History:   Procedure Laterality Date    APPENDECTOMY      CARDIAC PACEMAKER PLACEMENT       Family History    None       Tobacco Use    Smoking status: Every Day     Packs/day: 1.00     Types: Cigarettes     Start date: 1961    Smokeless tobacco: Never   Substance and Sexual Activity    Alcohol use: Yes     Comment: 1-2 times per year    Drug use: Never    Sexual activity: Not Currently     Review of Systems  Objective:   12 pt ROS WNL, except for HPI    Weight: 68 kg (150 lb)  Body mass index is 24.96 kg/m².  Vital Signs (Most Recent):  Temp: 97.2 °F (36.2 °C) (11/07/22 1004)  Pulse: 62 (11/07/22 1251)  Resp: 16 (11/07/22 1251)  BP: 128/80 (11/07/22 1134)  SpO2: 98 % (11/07/22 1251) Vital Signs (24h Range):  Temp:  [97.2 °F (36.2 °C)] 97.2 °F (36.2 °C)  Pulse:  [60-65] 62  Resp:  [16-23] 16  SpO2:  [95 %-98 %] 98 %  BP: (116-128)/(70-80) 128/80       Physical Exam:    Constitutional: He appears well-developed and well-nourished. No distress.     Eyes: Pupils are equal, round, and reactive to light. EOM are normal.     Cardiovascular: Intact distal pulses.     Abdominal: Soft. Bowel sounds are normal.     Psych/Behavior: He is alert. He is oriented to person, place, and time. He has a normal mood and affect.     Musculoskeletal:        Neck: Range of motion is limited. ROM severity is In rigid collar.        Back: Range of motion is full.        Right Upper Extremities: Range of motion is full. Muscle strength is 5/5.        Left Upper Extremities: Range of motion is full. Muscle strength is 5/5.       Right Lower Extremities: Range of motion is full. Muscle strength is 5/5.        Left Lower Extremities: Range of motion is full. Muscle strength is 5/5.     Neurological:        Sensory: There is no sensory deficit in the extremities.        DTRs: Tricep reflexes are 2+ on the right side and 2+ on the left  side. Bicep reflexes are 2+ on the right side and 2+ on the left side. Patellar reflexes are 2+ on the right side and 2+ on the left side. Achilles reflexes are 1+ on the right side and 1+ on the left side. He displays no Babinski's sign on the right side. He displays no Babinski's sign on the left side.   -clonus bilateral, -galvan's    Significant Labs:  Recent Labs   Lab 11/07/22  1023      K 2.8*   CO2 30   BUN 26.5*   CREATININE 2.71*   CALCIUM 8.6*   MG 2.10     Recent Labs   Lab 11/07/22  1023   WBC 9.2   HGB 11.3*   HCT 34.6*        No results for input(s): LABPT, INR, APTT in the last 48 hours.  Microbiology Results (last 7 days)       ** No results found for the last 168 hours. **            Significant Diagnostics:  CT Cervical Spine Without Contrast [[id number]] (Abnormal) Resulted: 11/07/22 1046   Order Status: Completed Updated: 11/07/22 1049   Narrative:     EXAMINATION:   CT CERVICAL SPINE WITHOUT CONTRAST     CLINICAL HISTORY:   Neck trauma (Age >= 65y);     TECHNIQUE:   Low dose axial images, sagittal and coronal reformations were performed though the cervical spine.  Contrast was not administered. Automatic exposure control is utilized to reduce patient radiation exposure.     COMPARISON:   04/28/2018     FINDINGS:   There are osteoporotic and degenerative changes seen throughout the cervical spine.  There is a fracture along the anterior inferior aspect of the C4 vertebral body at the base of the anterior bridging osteophyte.  No significant displacement is seen.  There is another anterior bridging osteophyte seen at C5-C6 and C6-C7 with no acute fracture seen in that region.  The odontoid lateral masses appear grossly unremarkable.  There is some torticollis to the right most likely related to spasm.  Multilevel facet arthropathy is seen.  Some areas of heterotrophic bone formation seen along the posterior aspect of vertebral body at C5-C6 adjacent to the spinous process.  No  spinous process fracture is seen.    Impression:       Fracture along the anterior inferior aspect of the C4 vertebral body at the base of the anterior bridging osteophyte with no significant displacement seen.     Torticollis to the right most likely related to spasm     Degenerative changes seen throughout the cervical spine        Assessment/Plan:     He is motor intact on exam. Gait not assessed.  CT reviewed; fx in acceptable alignment  Will treat conservatively in a rigid collar  OK for PT/OT  He is being admitted for further work up regarding his frequent falls. Recommend MRI of his brain and C spine.  Further recs to follow once imaging complete    Thank you for your consult.     MODESTO Salazar  Neurosurgery  [facility] - Emergency Dept

## 2022-11-08 PROBLEM — G40.909 SEIZURE DISORDER: Status: ACTIVE | Noted: 2022-01-01

## 2022-11-08 NOTE — PT/OT/SLP EVAL
Physical Therapy Evaluation    Patient Name:  Tim Meneses   MRN:  [id number]    Recommendations:     Discharge Recommendations:  outpatient PT, rehabilitation facility (TBD)   Discharge Equipment Recommendations: none   Barriers to discharge:  acuity of illness    Assessment:     Tim Meneses is a 77 y.o. male admitted with a medical diagnosis of Fall, C4 Vertebral Body Fracture.  He presents with the following impairments/functional limitations:  weakness, impaired endurance, impaired self care skills, impaired functional mobility, gait instability, impaired balance, decreased safety awareness, orthopedic precautions.    Rehab Prognosis: Good; patient would benefit from acute skilled PT services to address these deficits and reach maximum level of function.    Recent Surgery: * No surgery found *      Plan:     During this hospitalization, patient to be seen daily to address the identified rehab impairments via gait training, therapeutic activities, therapeutic exercises, neuromuscular re-education and progress toward the following goals:    Plan of Care Expires:  12/08/22    Subjective     Chief Complaint: none  Patient/Family Comments/goals: to be independent  Pain/Comfort:  Pain Rating 1: 0/10    Patients cultural, spiritual, Gnosticist conflicts given the current situation: no    Living Environment:  Pt lives with wife in a SLH with 4 steps to enter with right-sided handrails.  Prior to admission, patients level of function was modified independent.  Equipment used at home: walker, rolling.  DME owned (not currently used):  rollator .  Upon discharge, patient will have assistance from wife.    Objective:     Communicated with RN prior to session.  Patient found HOB elevated with oxygen, peripheral IV  upon PT entry to room.    General Precautions: Standard,     Orthopedic Precautions:spinal precautions   Braces: Aspen collar  Respiratory Status: Nasal cannula, flow 5 L/min    Exams:  Cognitive Exam:  Patient is  oriented to Person, Place, Time, and Situation  RLE ROM: WFL  RLE Strength: WFL  LLE ROM: WFL  LLE Strength: WFL    Functional Mobility:  Bed Mobility:     Supine to Sit: contact guard assistance  Sit to Supine: contact guard assistance  Transfers:     Sit to Stand:  contact guard assistance with rolling walker  Gait: pt ambulated 30 ft with a shuffled gt pattern with a RW and Beth. Pt demo'd a posterior lean during ambulation requiring Beth to prevent a posterior LOB.      AM-PAC 6 CLICK MOBILITY  Total Score:18       Patient left HOB elevated with all lines intact, call button in reach, and RN notified.    GOALS:   Multidisciplinary Problems       Physical Therapy Goals          Problem: Physical Therapy    Goal Priority Disciplines Outcome Goal Variances Interventions   Physical Therapy Goal     PT, PT/OT      Description: Goals to be met by: 2022     Patient will increase functional independence with mobility by performin. Supine to sit with [place]  2. Sit to supine with [place]  3. Sit to stand transfer with Modified [place]  4. Gait  x 200 feet with Stand-by Assistance using Rolling Walker.   5. Ascend/descend 4 stair with right Handrails Stand-by Assistance using RW vs LRAD.                          History:     Past Medical History:   Diagnosis Date    COPD (chronic obstructive pulmonary disease)     Hepatitis     Hypercholesterolemia     Hypertension     Malnutrition related to chronic disease     Seizure     Subacromial impingement of right shoulder     Ventricular tachycardia        Past Surgical History:   Procedure Laterality Date    APPENDECTOMY      CARDIAC PACEMAKER PLACEMENT         Time Tracking:     PT Received On: 22  PT Start Time: 1013     PT Stop Time: 1027  PT Total Time (min): 14 min     Billable Minutes: Evaluation , moderate complexity      2022

## 2022-11-08 NOTE — HPI
76 y/o M with PMH seizure d/o, HTN, PPM, and COPD who presented to ED on 11/7 for recurring falls. pt reportedly fell 7 times during last 72 hrs PTA. Falls associated with diplopia, dizziness, and usually falls to the R. pt reports symptoms unassociated with position changes and are w/o SOB or diaphoresis.     of note, pt reported symptoms began when he was started on on Celexa, aricept, and meclizine o/p by PCP, which were discontinued per pt's neurologist. pt reports unsure if gait imbalance and recurring falls are r/t medications d/t continuing to fall once medications d/c'ed.     pt's neurologist Dr. george and o/p AEDs include lacosamide 100 mg BID and lamotrigine 300/200 mg.     Upon admission, CT head negative for acute intracranial abnormalities. CT c-spine significant for C4 vertebral body fx. no surgical interventions needed at this time per neurosurgery and pt in an aspen collar.

## 2022-11-08 NOTE — H&P
[facility]                       [address] [place] [id number]    PATIENT NAME:       JESSY BRONSON  YOB: 1945  CSN:                [id number]   MRN:                [id number]  ADMIT DATE:         11/07/2022 10:05:00  PHYSICIAN:          Jama Sarmiento MD                        HISTORY AND PHYSICAL      This is a 77-year-old  male.  He comes to the hospital after his   daughter described that he had multiple falls this weekend.  In the last 2 or 3   days he has fallen at least 6-7 times.  He feels like he is drifting to the   right and he had been complaining of some dizziness and double vision.    Yesterday he felt dizzy.  He is a smoker and he has chronic shortness of breath.    He had been evaluated by his PCP, by his neurologist Dr. Murguia and they feel   like the falls have been caused by medications side effect with some   adjustments made.  This has worsened over the last 3 days.      CT of the head was negative for any acute intracranial findings.  In the neck,   he had a C4 vertebral body fracture for which he was consulted to Neurosurgery.    He denies any chest pain, palpitations.  He was evaluated in the Emergency   Room, found to have some wheezes and nystagmus.  Neurological, the   finger-to-nose normal.  Visual fields intact.  He was found to have   significantly low potassium at 2.8, a BUN of 26.5, creatinine 2.71, which is   higher than previously.  H and H was 11.3 and 34.6.  He had some cerebral   atrophy and ventricular enlargement.  The patient admitted for further   evaluation and treatment.    REVIEW OF SYSTEMS:  Times 12 as above.    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    He has history of seizure disorder, hypertension, history of a stroke in the   past, history of recurrent bradycardia with pacemaker, COPD, active smoker.  He    had dyslipidemia, history of COPD, hepatitis in the past, seizure disorder,   cardiac arrhythmias including ventricular tachycardia.  He has chronic anemia of   renal disease.  He has history of seizure disorder, BPH in the past, chronic   kidney issues, anxiety, depression, allergic rhinitis.  He has questionable   history of pulmonary nodules.    PAST SURGICAL HISTORY:  Includes pacemaker and appendectomy.    SOCIAL HISTORY:  He is a heavy smoker for many years, a pack per day.  Denies   alcohol or illegal drugs.    ALLERGIES:  HE HAS NO KNOWN DRUG ALLERGIES.     MEDICATIONS:  Please see MAR.    FAMILY HISTORY:  Noncontributory.    PHYSICAL EXAMINATION:  VITAL SIGNS:  Remarkable for blood pressure 160/70, pulse 65, temp 97.2.  GENERAL APPEARANCE:  He looks ill.    HEENT:  He has some nystagmus in both eyes.    NECK:  Supple.  There is no JVD, no bruits.    HEART:  Regular rhythm and rate.    LUNGS:  He has mild diffuse wheezes.    ABDOMEN:  Soft, nontender.  Bowel sounds present.  GENITAL/RECTAL:  No discharge, normal for age.    EXTREMITIES:  No clubbing, cyanosis, or edema.  NEUROLOGIC:  Oriented x3.    Normal strength.  Finger-to-nose normal.  Visual fields intact.    LABS:  Remarkable for a potassium of 2.8, glucose 120, nonfasting, BUN 26.5,   creatinine 2.71, albumin 2.8, H and H 11.3 and 34.6.  EKG showed an atrial paced   rhythm.      CT of the head is remarkable for no acute abnormalities.  Cervical spine shows   C4 vertebral body fracture.       Chest x-ray shows no acute issues.    IMPRESSION:    1. Status post multiple fall with right-sided drift, dizziness, denies weakness.     2. Chronic obstructive pulmonary disease with bronchospasm,.   3. History of hypertension.  4. Cardiac arrhythmias.  5. Chronic bradycardia and non-sustained ventricular tachycardia.    6. He has history of cerebrovascular accident in the past.  7. History of chronic  issues.   8. Dyslipidemia.  9. Tobacco abuse.  10.   chronic obstructive pulmonary disease.    11. 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    12. C4 fracture.  13. Significant clinical malnutrition and deconditioning.  14. Seizure disorder.    PLAN:  Patient to be seen by Neurosurgery.  We will go ahead and get Neurology   involved possibly.  We will get an MRI if possible.  We will get PT, OT.  He is   going to have a cervical collar.  Will give some fluids to improve his kidney   function.  Will give him some breathing treatments.  He has history of seizure   disorder.  The patient may need rehab.  We will consult Neurology.        ______________________________  MD TICO Kendall/RAMONAS  DD:  11/07/2022  Time:  08:01PM  DT:  11/07/2022  Time:  09:16PM  Job #:  [id number]      HISTORY AND PHYSICAL

## 2022-11-08 NOTE — PLAN OF CARE
Goals to be met by: 2022     Patient will increase functional independence with mobility by performin. Supine to sit with [place]  2. Sit to supine with [place]  3. Sit to stand transfer with Modified [place]  4. Gait  x 200 feet with Stand-by Assistance using Rolling Walker.   5. Ascend/descend 4 stair with right Handrails Stand-by Assistance using RW vs LRAD.

## 2022-11-08 NOTE — PROGRESS NOTES
"[facility] - [address] [place] [address]  Neurosurgery  Progress Note    Subjective:     Interval History: He is sitting up in bed, NAD. He has minimal c/o neck pain today. He is currently wearing a well-fitted Aspen collar. We are awaiting MRIs of his C spine and brain.     History of Present Illness: Patient is a 78 yo male with PMH of seizure d/o, HTN, recurrent bradycardia s/p pacemaker placement and COPD, who presents to the ED accompanied by his daughter for recurrent falls. Daughter states that the patient has fallen at least 7 times over the last 2-3 days. Pt complains of double vision and states his body is "pulling him towards the right and that he always falls to the right." Yesterday pt felt dizzy. No LOC. Pt also c/o SOB, which is chronic. Per daughter, the patient has been evaluated by his PCP, cardiologist and neurologist (Dr. Murguia), and was thought that his falls are caused by medication side effect and so recent adjustments have been made. She feels that his falls and unsteady gait have worsened following the medication adjustment.     CT of his head was negative for acute intracranial findings. His CT neck was significant for C4 vertebral body fx. Dr. Nathan has been consulted for evaluation and treatment recommendations. Plan to treat conservatively in an aspen collar with plans for MRI brain and C spine to further assess falls.    Post-Op Info:  * No surgery found *          Medications:  Continuous Infusions:   0.45% NaCl with KCl 30 mEq infusion 75 mL/hr (11/07/22 0189)     Scheduled Meds:   aspirin  81 mg Oral Daily    atorvastatin  40 mg Oral Daily    carvediloL  25 mg Oral BID    enoxaparin  30 mg Subcutaneous Daily    EScitalopram oxalate  5 mg Oral Daily    fluticasone propionate  1 spray Each Nostril Daily    furosemide  40 mg Oral Daily    lacosamide  100 mg Oral Q12H    lamoTRIgine  200 mg Oral BID    NIFEdipine  60 mg Oral Daily     PRN Meds:albuterol, cloNIDine     Review of " Systems  Objective:   Unchanged from previous    Weight: 68 kg (150 lb)  Body mass index is 24.96 kg/m².  Vital Signs (Most Recent):  Temp: 98.7 °F (37.1 °C) (11/08/22 0841)  Pulse: 66 (11/08/22 0841)  Resp: 20 (11/08/22 0841)  BP: (!) 147/81 (11/08/22 0841)  SpO2: (!) 94 % (11/08/22 0841)   Vital Signs (24h Range):  Temp:  [97.2 °F (36.2 °C)-98.7 °F (37.1 °C)] 98.7 °F (37.1 °C)  Pulse:  [57-70] 66  Resp:  [13-24] 20  SpO2:  [92 %-100 %] 94 %  BP: (116-150)/(62-81) 147/81     Neurosurgery Physical Exam  Constitutional: He appears well-developed and well-nourished. No distress.      Eyes: Pupils are equal, round, and reactive to light. EOM are normal. He c/o double vision in bilateral eyes, central     Cardiovascular: Intact distal pulses.      Abdominal: Soft. Bowel sounds are normal.      Psych/Behavior: He is alert. He is oriented to person, place, and time. He has a normal mood and affect.      Musculoskeletal:        Neck: Range of motion is limited. ROM severity is In rigid collar.        Back: Range of motion is full.        Right Upper Extremities: Range of motion is full. Muscle strength is 5/5.        Left Upper Extremities: Range of motion is full. Muscle strength is 5/5.       Right Lower Extremities: Range of motion is full. Muscle strength is 5/5.        Left Lower Extremities: Range of motion is full. Muscle strength is 5/5.      Neurological:        Sensory: There is no sensory deficit in the extremities.        DTRs: Tricep reflexes are 2+ on the right side and 2+ on the left side. Bicep reflexes are 2+ on the right side and 2+ on the left side. Patellar reflexes are 2+ on the right side and 2+ on the left side. Achilles reflexes are 1+ on the right side and 1+ on the left side. He displays no Babinski's sign on the right side. He displays no Babinski's sign on the left side.          -clonus bilateral, -galvan's    Significant Labs:  Recent Labs   Lab 11/07/22  1023 11/08/22  0126 11/08/22  0447      --  144   K 2.8*  --  3.3*   CO2 30  --  30   BUN 26.5*  --  21.9   CREATININE 2.71*  --  2.24*   CALCIUM 8.6*  --  8.6*   MG 2.10 2.00  --      Recent Labs   Lab 11/07/22  1023 11/08/22  0126   WBC 9.2 8.8   HGB 11.3* 11.8*   HCT 34.6* 36.2*    253     No results for input(s): LABPT, INR, APTT in the last 48 hours.  Microbiology Results (last 7 days)       ** No results found for the last 168 hours. **            Significant Diagnostics:      Assessment/Plan:     He is doing better in his collar today. Minimal c/o neck pain.  We are awaiting MRIs; further recs to follow  Neurology has been consulted for their recommendations  OK for PT/OT  SCDs and lovenox for DVT prophylaxis    MODESTO Salazar  Neurosurgery  [facility] - [address] [place] [address]

## 2022-11-08 NOTE — NURSING
WOCN consult-78 y/o male in with closed fx 4th cervical.   Awake alert oriented and mobil with assist with neck collar in place.  He appears to have lost a lot of wgt with the amt of loose skin noted.    He has multiple skin tears to arms and they are ecchymotic.   He also has redness to coccyx crease and a sm yellowed area.   Care put in place.    Low airloss support ordered and care instructions to nurse Manning.     11/08/22 1137        Altered Skin Integrity 11/08/22 1130 Left anterior;lower;proximal Arm   Date First Assessed/Time First Assessed: 11/08/22 1130   Altered Skin Integrity Present on Admission: yes  Side: Left  Orientation: anterior;lower;proximal  Location: (c) Arm   Wound Image      Description of Altered Skin Integrity   (skin tears)   Dressing Appearance Moist drainage   Drainage Amount Scant   Drainage Characteristics/Odor Serosanguineous;No odor   Appearance Red;Moist   Tissue loss description Partial thickness   Red (%), Wound Tissue Color 100 %   Periwound Area Ecchymotic   Wound Edges Jagged   Care Cleansed with:;Wound cleanser   Dressing Foam        Altered Skin Integrity 11/08/22 1130 Right anterior;lower;proximal Arm   Date First Assessed/Time First Assessed: 11/08/22 1130   Side: Right  Orientation: anterior;lower;proximal  Location: (c) Arm   Wound Image    Description of Altered Skin Integrity Partial thickness tissue loss. Shallow open ulcer with a red or pink wound bed, without slough. Intact or Open/Ruptured Serum-filled blister.   Dressing Appearance Intact;Moist drainage   Drainage Amount Small   Drainage Characteristics/Odor Serosanguineous;No odor   Appearance Red;Moist   Red (%), Wound Tissue Color 100 %   Periwound Area Ecchymotic   Care Cleansed with:;Wound cleanser   Dressing Foam        Altered Skin Integrity 11/08/22 1130 medial Coccyx    Date First Assessed/Time First Assessed: 11/08/22 1130   Altered Skin Integrity Present on Admission: yes  Orientation: medial  Location:  Coccyx  Is this injury device related?: No  Primary Wound Type: (c)    Wound Image    Description of Altered Skin Integrity Partial thickness tissue loss. Shallow open ulcer with a red or pink wound bed, without slough. Intact or Open/Ruptured Serum-filled blister.   Drainage Amount None   Appearance Yellow;Moist   Periwound Area   (red)   Wound Edges Approximated   Wound Length (cm) 0.4 cm   Wound Width (cm) 0.4 cm   Wound Surface Area (cm^2) 0.16 cm^2   Care Cleansed with:;Wound cleanser;Applied:;Skin Barrier   Dressing Gauze   Safety Management   Safety Promotion/Fall Prevention assistive device/personal item within reach;commode/urinal/bedpan at bedside;Fall Risk signage in place;nonskid shoes/socks when out of bed;instructed to call staff for mobility   Patient Rounds bed in low position;bed wheels locked;call light in patient/parent reach;clutter free environment maintained;ID band on;placement of personal items at bedside;visualized patient

## 2022-11-08 NOTE — SUBJECTIVE & OBJECTIVE
Past Medical History:   Diagnosis Date    COPD (chronic obstructive pulmonary disease)     Hepatitis     Hypercholesterolemia     Hypertension     Malnutrition related to chronic disease     Seizure     Subacromial impingement of right shoulder     Ventricular tachycardia        Past Surgical History:   Procedure Laterality Date    APPENDECTOMY      CARDIAC PACEMAKER PLACEMENT         Review of patient's allergies indicates:  No Known Allergies    Current Neurological Medications:     No current facility-administered medications on file prior to encounter.     Current Outpatient Medications on File Prior to Encounter   Medication Sig    aspirin (ECOTRIN) 81 MG EC tablet Take 81 mg by mouth once daily.    atorvastatin (LIPITOR) 40 MG tablet Take 1 tablet by mouth once daily.    carvediloL (COREG) 25 MG tablet Take 1 tablet by mouth 2 (two) times daily.    cetirizine (ZYRTEC) 10 MG tablet Take 10 mg by mouth once daily.    furosemide (LASIX) 40 MG tablet Take 1 tablet by mouth once daily.    lacosamide (VIMPAT) 100 mg Tab Take 1 tablet (100 mg total) by mouth every 12 (twelve) hours.    lamoTRIgine (LAMICTAL) 200 MG tablet 1 po q am; 1.5 po qhs    NIFEdipine (PROCARDIA-XL) 60 MG (OSM) 24 hr tablet Take 60 mg by mouth once daily.    albuterol (PROVENTIL/VENTOLIN HFA) 90 mcg/actuation inhaler Inhale 2 puffs into the lungs every 4 (four) hours as needed.    cloNIDine (CATAPRES) 0.1 MG tablet Take 1 tablet by mouth every 8 (eight) hours as needed. Take 1 tablet orally every 8hrs as needed for SBP greater than 175 or DBP greater than 100    EScitalopram oxalate (LEXAPRO) 5 MG Tab Take 1 tablet by mouth once daily.    fluticasone propionate (FLONASE) 50 mcg/actuation nasal spray 1 spray by Each Nostril route once daily.     Family History    None       Tobacco Use    Smoking status: Every Day     Packs/day: 1.00     Types: Cigarettes     Start date: 1961    Smokeless tobacco: Never   Substance and Sexual Activity    Alcohol  use: Yes     Comment: 1-2 times per year    Drug use: Never    Sexual activity: Not Currently     Review of Systems  A 14pt ros was reviewed & is negative unless o/w documented in the hpi    Objective:     Vital Signs (Most Recent):  Temp: 98.7 °F (37.1 °C) (11/08/22 0841)  Pulse: 66 (11/08/22 0841)  Resp: 20 (11/08/22 0841)  BP: (!) 147/81 (11/08/22 0841)  SpO2: (!) 94 % (11/08/22 0841)   Vital Signs (24h Range):  Temp:  [98.7 °F (37.1 °C)] 98.7 °F (37.1 °C)  Pulse:  [57-70] 66  Resp:  [13-24] 20  SpO2:  [92 %-100 %] 94 %  BP: (119-150)/(62-81) 147/81     Weight: 68 kg (150 lb)  Body mass index is 24.96 kg/m².    Physical Exam  Vitals reviewed.   Constitutional:       General: He is awake.      Appearance: He is underweight.      Interventions: Cervical collar in place.   HENT:      Head: Normocephalic.      Nose: Nose normal.      Mouth/Throat:      Mouth: Mucous membranes are moist.      Pharynx: Oropharynx is clear.   Eyes:      Pupils: Pupils are equal, round, and reactive to light.   Pulmonary:      Effort: Pulmonary effort is normal.   Skin:     Findings: Bruising present.   Neurological:      Mental Status: He is alert and oriented to person, place, and time.      Coordination: Finger-Nose-Finger Test normal.   Psychiatric:         Mood and Affect: Mood normal.         Speech: Speech normal.         Behavior: Behavior normal. Behavior is cooperative.         Thought Content: Thought content normal.         Judgment: Judgment normal.       NEUROLOGICAL EXAMINATION:     MENTAL STATUS   Oriented to person, place, and time.   Follows 3 step commands.   Attention: normal. Concentration: normal.   Speech: speech is normal   Level of consciousness: alert  Knowledge: good.   Able to name object. Normal comprehension. Praxis: normal     CRANIAL NERVES     CN II   Visual fields full to confrontation.     CN III, IV, VI   Pupils are equal, round, and reactive to light.  CN VI: bilateral CN VI palsy  Nystagmus: none    Diplopia: bilateral  Ophthalmoparesis: left abduction and right abduction  Upgaze: normal  Downgaze: normal  Conjugate gaze: present    CN V   Facial sensation intact.     CN VII   Facial expression full, symmetric.     CN XII   CN XII normal.     MOTOR EXAM   Muscle bulk: decreased  Overall muscle tone: increased  Right arm pronator drift: present  Left arm pronator drift: present    Strength   Right deltoid: 5/5  Left deltoid: 4/5  Right biceps: 5/5  Left biceps: 4/5  Right triceps: 5/5  Left triceps: 4/5  Right quadriceps: 5/5  Left quadriceps: 4/5  Right hamstrin/5  Left hamstrin/5  Right anterior tibial: 5/5  Left anterior tibial: 4/5  Right posterior tibial: 5/5  Left posterior tibial: 4/5    REFLEXES     Reflexes   Right plantar: upgoing  Left plantar: upgoing  Right ankle clonus: absent  Left ankle clonus: present    SENSORY EXAM   Right arm light touch: normal  Left arm light touch: decreased from elbow  Right leg light touch: normal  Left leg light touch: decreased from knee    GAIT AND COORDINATION      Coordination   Finger to nose coordination: normal    Significant Labs:   Recent Lab Results         22  0443   22  0126   22  2206        Albumin/Globulin Ratio 1.0           Albumin 2.7           Alkaline Phosphatase 91           ALT 9           AST 23           Baso #   0.07         Basophil %   0.8         BILIRUBIN TOTAL 0.8           BUN 21.9           Calcium 8.6           Chloride 104           CO2 30           Creatinine 2.24           eGFR 30           Eos #   0.19         Eosinophil %   2.1         Globulin, Total 2.7           Glucose 90           Hematocrit   36.2         Hemoglobin   11.8         Immature Grans (Abs)   0.04         Immature Granulocytes   0.5         Lymph #   1.89         LYMPH %   21.4         Magnesium   2.00         MCH   28.2         MCHC   32.6         MCV   86.4         Mono #   0.89         Mono %   10.1         MPV   9.2         Neut #    5.8         Neut %   65.1         nRBC   0.0         Phosphorus     3.0       Platelets   253         Potassium 3.3           PROTEIN TOTAL 5.4           RBC   4.19         RDW   13.2         Sodium 144           WBC   8.8                 Significant Imaging:   CT head w/o 11/7/2022:  FINDINGS:  No acute intracranial hemorrhage, edema or mass. No acute parenchymal abnormality.     Diffuse cerebral atrophy with concordant ventricular enlargement     Scattered hypodensities throughout the deep periventricular white matter.     The osseous structures are normal.     The mastoid air cells are clear.     Debris noted in the right auditory canal.     The globes and orbital contents are normal bilaterally.     Mucous retention cyst within the right maxillary sinus.     Impression:     No acute intracranial abnormality identified.  Findings of chronic microvascular ischemic disease.     CT c-spine 11/7/2022:  FINDINGS:  There are osteoporotic and degenerative changes seen throughout the cervical spine.  There is a fracture along the anterior inferior aspect of the C4 vertebral body at the base of the anterior bridging osteophyte.  No significant displacement is seen.  There is another anterior bridging osteophyte seen at C5-C6 and C6-C7 with no acute fracture seen in that region.  The odontoid lateral masses appear grossly unremarkable.  There is some torticollis to the right most likely related to spasm.  Multilevel facet arthropathy is seen.  Some areas of heterotrophic bone formation seen along the posterior aspect of vertebral body at C5-C6 adjacent to the spinous process.  No spinous process fracture is seen.     Impression:     Fracture along the anterior inferior aspect of the C4 vertebral body at the base of the anterior bridging osteophyte with no significant displacement seen.     Torticollis to the right most likely related to spasm     Degenerative changes seen throughout the cervical spine    I have reviewed  all pertinent imaging results/findings within the past 24 hours.

## 2022-11-08 NOTE — CONSULTS
[facility]  Neurology  Consult Note    Patient Name: Tim Meneses  MRN: [id number]  Admission Date: 11/7/2022  Hospital Length of Stay: 1 days  Code Status: No Order   Attending Provider: Moreno Ceja MD   Consulting Provider: HIMANSHU ZhuCNP-BC  Primary Care Physician: Primary Doctor No  Principal Problem:<principal problem not specified>    Inpatient consult to Neurology  Consult performed by: JOSE Zhu  Consult ordered by: MODESTO Salazar         Subjective:     Chief Complaint:       HPI:   78 y/o M with PMH seizure d/o, HTN, PPM, and COPD who presented to ED on 11/7 for recurring falls. pt reportedly fell 7 times during last 72 hrs PTA. Falls associated with diplopia, dizziness, and usually falls to the R. pt reports symptoms unassociated with position changes and are w/o SOB or diaphoresis.     of note, pt reported symptoms began when he was started on on Celexa, aricept, and meclizine o/p by PCP, which were discontinued per pt's neurologist. pt reports unsure if gait imbalance and recurring falls are r/t medications d/t continuing to fall once medications d/c'ed.     pt's neurologist Dr. george and o/p AEDs include lacosamide 100 mg BID and lamotrigine 300/200 mg.     Upon admission, CT head negative for acute intracranial abnormalities. CT c-spine significant for C4 vertebral body fx. no surgical interventions needed at this time per neurosurgery and pt in an aspen collar.        Past Medical History:   Diagnosis Date    COPD (chronic obstructive pulmonary disease)     Hepatitis     Hypercholesterolemia     Hypertension     Malnutrition related to chronic disease     Seizure     Subacromial impingement of right shoulder     Ventricular tachycardia        Past Surgical History:   Procedure Laterality Date    APPENDECTOMY      CARDIAC PACEMAKER PLACEMENT         Review of patient's allergies indicates:  No Known Allergies    Current  Neurological Medications:     No current facility-administered medications on file prior to encounter.     Current Outpatient Medications on File Prior to Encounter   Medication Sig    aspirin (ECOTRIN) 81 MG EC tablet Take 81 mg by mouth once daily.    atorvastatin (LIPITOR) 40 MG tablet Take 1 tablet by mouth once daily.    carvediloL (COREG) 25 MG tablet Take 1 tablet by mouth 2 (two) times daily.    cetirizine (ZYRTEC) 10 MG tablet Take 10 mg by mouth once daily.    furosemide (LASIX) 40 MG tablet Take 1 tablet by mouth once daily.    lacosamide (VIMPAT) 100 mg Tab Take 1 tablet (100 mg total) by mouth every 12 (twelve) hours.    lamoTRIgine (LAMICTAL) 200 MG tablet 1 po q am; 1.5 po qhs    NIFEdipine (PROCARDIA-XL) 60 MG (OSM) 24 hr tablet Take 60 mg by mouth once daily.    albuterol (PROVENTIL/VENTOLIN HFA) 90 mcg/actuation inhaler Inhale 2 puffs into the lungs every 4 (four) hours as needed.    cloNIDine (CATAPRES) 0.1 MG tablet Take 1 tablet by mouth every 8 (eight) hours as needed. Take 1 tablet orally every 8hrs as needed for SBP greater than 175 or DBP greater than 100    EScitalopram oxalate (LEXAPRO) 5 MG Tab Take 1 tablet by mouth once daily.    fluticasone propionate (FLONASE) 50 mcg/actuation nasal spray 1 spray by Each Nostril route once daily.     Family History    None       Tobacco Use    Smoking status: Every Day     Packs/day: 1.00     Types: Cigarettes     Start date: 1961    Smokeless tobacco: Never   Substance and Sexual Activity    Alcohol use: Yes     Comment: 1-2 times per year    Drug use: Never    Sexual activity: Not Currently     Review of Systems  A 14pt ros was reviewed & is negative unless o/w documented in the hpi    Objective:     Vital Signs (Most Recent):  Temp: 98.7 °F (37.1 °C) (11/08/22 0841)  Pulse: 66 (11/08/22 0841)  Resp: 20 (11/08/22 0841)  BP: (!) 147/81 (11/08/22 0841)  SpO2: (!) 94 % (11/08/22 0841)   Vital Signs (24h Range):  Temp:  [98.7 °F (37.1  °C)] 98.7 °F (37.1 °C)  Pulse:  [57-70] 66  Resp:  [13-24] 20  SpO2:  [92 %-100 %] 94 %  BP: (119-150)/(62-81) 147/81     Weight: 68 kg (150 lb)  Body mass index is 24.96 kg/m².    Physical Exam  Vitals reviewed.   Constitutional:       General: He is awake.      Appearance: He is underweight.      Interventions: Cervical collar in place.   HENT:      Head: Normocephalic.      Nose: Nose normal.      Mouth/Throat:      Mouth: Mucous membranes are moist.      Pharynx: Oropharynx is clear.   Eyes:      Pupils: Pupils are equal, round, and reactive to light.   Pulmonary:      Effort: Pulmonary effort is normal.   Skin:     Findings: Bruising present.   Neurological:      Mental Status: He is alert and oriented to person, place, and time.      Coordination: Finger-Nose-Finger Test normal.   Psychiatric:         Mood and Affect: Mood normal.         Speech: Speech normal.         Behavior: Behavior normal. Behavior is cooperative.         Thought Content: Thought content normal.         Judgment: Judgment normal.       NEUROLOGICAL EXAMINATION:     MENTAL STATUS   Oriented to person, place, and time.   Follows 3 step commands.   Attention: normal. Concentration: normal.   Speech: speech is normal   Level of consciousness: alert  Knowledge: good.   Able to name object. Normal comprehension. Praxis: normal     CRANIAL NERVES     CN II   Visual fields full to confrontation.     CN III, IV, VI   Pupils are equal, round, and reactive to light.  CN VI: bilateral CN VI palsy  Nystagmus: none   Diplopia: bilateral  Ophthalmoparesis: left abduction and right abduction  Upgaze: normal  Downgaze: normal  Conjugate gaze: present    CN V   Facial sensation intact.     CN VII   Facial expression full, symmetric.     CN XII   CN XII normal.     MOTOR EXAM   Muscle bulk: decreased  Overall muscle tone: increased  Right arm pronator drift: present  Left arm pronator drift: present    Strength   Right deltoid: 5/5  Left deltoid:  4/5  Right biceps: 5/5  Left biceps: 4/5  Right triceps: 5/5  Left triceps: 4/5  Right quadriceps: 5/5  Left quadriceps: 4/5  Right hamstrin/5  Left hamstrin/5  Right anterior tibial: 5/5  Left anterior tibial: 4/5  Right posterior tibial: 5/5  Left posterior tibial: 4/5    REFLEXES     Reflexes   Right plantar: upgoing  Left plantar: upgoing  Right ankle clonus: absent  Left ankle clonus: present    SENSORY EXAM   Right arm light touch: normal  Left arm light touch: decreased from elbow  Right leg light touch: normal  Left leg light touch: decreased from knee    GAIT AND COORDINATION      Coordination   Finger to nose coordination: normal    Significant Labs:   Recent Lab Results         22  0443   22  0126   22  2206        Albumin/Globulin Ratio 1.0           Albumin 2.7           Alkaline Phosphatase 91           ALT 9           AST 23           Baso #   0.07         Basophil %   0.8         BILIRUBIN TOTAL 0.8           BUN 21.9           Calcium 8.6           Chloride 104           CO2 30           Creatinine 2.24           eGFR 30           Eos #   0.19         Eosinophil %   2.1         Globulin, Total 2.7           Glucose 90           Hematocrit   36.2         Hemoglobin   11.8         Immature Grans (Abs)   0.04         Immature Granulocytes   0.5         Lymph #   1.89         LYMPH %   21.4         Magnesium   2.00         MCH   28.2         MCHC   32.6         MCV   86.4         Mono #   0.89         Mono %   10.1         MPV   9.2         Neut #   5.8         Neut %   65.1         nRBC   0.0         Phosphorus     3.0       Platelets   253         Potassium 3.3           PROTEIN TOTAL 5.4           RBC   4.19         RDW   13.2         Sodium 144           WBC   8.8                 Significant Imaging:   CT head w/o 2022:  FINDINGS:  No acute intracranial hemorrhage, edema or mass. No acute parenchymal abnormality.     Diffuse cerebral atrophy with concordant ventricular  enlargement     Scattered hypodensities throughout the deep periventricular white matter.     The osseous structures are normal.     The mastoid air cells are clear.     Debris noted in the right auditory canal.     The globes and orbital contents are normal bilaterally.     Mucous retention cyst within the right maxillary sinus.     Impression:     No acute intracranial abnormality identified.  Findings of chronic microvascular ischemic disease.     CT c-spine 11/7/2022:  FINDINGS:  There are osteoporotic and degenerative changes seen throughout the cervical spine.  There is a fracture along the anterior inferior aspect of the C4 vertebral body at the base of the anterior bridging osteophyte.  No significant displacement is seen.  There is another anterior bridging osteophyte seen at C5-C6 and C6-C7 with no acute fracture seen in that region.  The odontoid lateral masses appear grossly unremarkable.  There is some torticollis to the right most likely related to spasm.  Multilevel facet arthropathy is seen.  Some areas of heterotrophic bone formation seen along the posterior aspect of vertebral body at C5-C6 adjacent to the spinous process.  No spinous process fracture is seen.     Impression:     Fracture along the anterior inferior aspect of the C4 vertebral body at the base of the anterior bridging osteophyte with no significant displacement seen.     Torticollis to the right most likely related to spasm     Degenerative changes seen throughout the cervical spine    I have reviewed all pertinent imaging results/findings within the past 24 hours.    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 Further recommendations to follow per MD Tamy Lamb, [facility]-BC  Inpatient Neurology  [facility] - [address] [place] [address]

## 2022-11-09 NOTE — PROGRESS NOTES
I did not see the pt today.   I started him on sinemet  TID, on Monday I would like to increase it to sinemet  2 tab TID, if he is still here.   - I would like PT to assess if there is any improvement in his gait with the introduction of sinemet, and I would love to visit the pt and examin him while he is working with PT, so please call us when PT is going to the room to work with the pt tomorrow or Monday.   - make sure the pt follows up with Dr Murguia in 2-3 weeks.

## 2022-11-09 NOTE — PROGRESS NOTES
"[facility] - [address] [place] [address]  Neurosurgery  Progress Note    Subjective:     Interval History: Sitting up in bed, no acute events overnight. Minimal c/o pain at the fracture site. In aspen collar. Neurology has evaluated patient and made recommendations.    History of Present Illness: Patient is a 78 yo male with PMH of seizure d/o, HTN, recurrent bradycardia s/p pacemaker placement and COPD, who presents to the ED accompanied by his daughter for recurrent falls. Daughter states that the patient has fallen at least 7 times over the last 2-3 days. Pt complains of double vision and states his body is "pulling him towards the right and that he always falls to the right." Yesterday pt felt dizzy. No LOC. Pt also c/o SOB, which is chronic. Per daughter, the patient has been evaluated by his PCP, cardiologist and neurologist (Dr. Murguia), and was thought that his falls are caused by medication side effect and so recent adjustments have been made. She feels that his falls and unsteady gait have worsened following the medication adjustment.     CT of his head was negative for acute intracranial findings. His CT neck was significant for C4 vertebral body fx. Dr. Nathan has been consulted for evaluation and treatment recommendations. Plan to treat conservatively in an aspen collar with plans for MRI brain and C spine to further assess falls.    Post-Op Info:  * No surgery found *          Medications:  Continuous Infusions:   0.45% NaCl with KCl 30 mEq infusion 75 mL/hr (11/09/22 6388)     Scheduled Meds:   aspirin  81 mg Oral Daily    atorvastatin  40 mg Oral Daily    carbidopa-levodopa  mg  1 tablet Oral TID    carvediloL  25 mg Oral BID    enoxaparin  30 mg Subcutaneous Daily    EScitalopram oxalate  5 mg Oral Daily    fluticasone propionate  1 spray Each Nostril Daily    furosemide  40 mg Oral Daily    lacosamide  100 mg Oral Q12H    lamoTRIgine  200 mg Oral BID    NIFEdipine  60 mg Oral Daily    zinc " oxide-cod liver oil   Topical (Top) BID     PRN Meds:albuterol, cloNIDine     Review of Systems  Objective:   Unchanged from previous    Weight: 68 kg (150 lb)  Body mass index is 24.96 kg/m².  Vital Signs (Most Recent):  Temp: 98 °F (36.7 °C) (11/09/22 0715)  Pulse: 68 (11/09/22 0715)  Resp: 18 (11/09/22 0715)  BP: (!) 166/57 (11/09/22 0715)  SpO2: 96 % (11/09/22 0715)   Vital Signs (24h Range):  Temp:  [97.2 °F (36.2 °C)-98.8 °F (37.1 °C)] 98 °F (36.7 °C)  Pulse:  [60-73] 68  Resp:  [16-18] 18  SpO2:  [96 %-100 %] 96 %  BP: (132-170)/(57-88) 166/57   Neurosurgery Physical Exam  Constitutional: He appears well-developed and well-nourished. No distress.      Eyes: Pupils are equal, round, and reactive to light. EOM are normal. He c/o double vision in bilateral eyes, central     Cardiovascular: Intact distal pulses.      Abdominal: Soft. Bowel sounds are normal.      Psych/Behavior: He is alert. He is oriented to person, place, and time. He has a normal mood and affect.      Musculoskeletal:        Neck: Range of motion is limited. ROM severity is In rigid collar.        Back: Range of motion is full.        Right Upper Extremities: Range of motion is full. Muscle strength is 5/5.        Left Upper Extremities: Range of motion is full. Muscle strength is 5/5.       Right Lower Extremities: Range of motion is full. Muscle strength is 5/5.        Left Lower Extremities: Range of motion is full. Muscle strength is 5/5.      Neurological:        Sensory: There is no sensory deficit in the extremities.        DTRs: Tricep reflexes are 2+ on the right side and 2+ on the left side. Bicep reflexes are 2+ on the right side and 2+ on the left side. Patellar reflexes are 2+ on the right side and 2+ on the left side. Achilles reflexes are 1+ on the right side and 1+ on the left side. He displays no Babinski's sign on the right side. He displays no Babinski's sign on the left side.          -clonus bilateral,  -mandy's    Significant Labs:  Recent Labs   Lab 11/08/22 0126 11/08/22  0443   NA  --  144   K  --  3.3*   CO2  --  30   BUN  --  21.9   CREATININE  --  2.24*   CALCIUM  --  8.6*   MG 2.00  --      Recent Labs   Lab 11/08/22  0126   WBC 8.8   HGB 11.8*   HCT 36.2*        No results for input(s): LABPT, INR, APTT in the last 48 hours.  Microbiology Results (last 7 days)       ** No results found for the last 168 hours. **            Significant Diagnostics:      Assessment/Plan:     Active Diagnoses:    Diagnosis Date Noted POA    Seizure disorder [G40.909] 11/08/2022 Unknown    Imbalance [R26.89] 11/01/2022 Yes    Frequent falls [R29.6] 11/01/2022 Not Applicable      Problems Resolved During this Admission:     His motor exam is stable. Minimal c/o neck pain.  We are awaiting MRIs; further recs to follow  Neurology is following  OK for PT/OT  SCDs and lovenox for DVT prophylaxis    MODESTO Salazar  Neurosurgery  [facility] - [address] [place] [address]

## 2022-11-09 NOTE — PROGRESS NOTES
[facility]                       [address] [place] [address]    PATIENT NAME:       JESSY BRONSON  YOB: 1945  CSN:                [id number]   MRN:                [id number]  ADMIT DATE:         11/07/2022 10:05:00  PHYSICIAN:          Jama Sarmiento MD                            PROGRESS NOTE    DATE:      SUBJECTIVE:  A 77-year-old white male, admitted after multiple falls and   dizziness of unknown etiology.  He had a fracture of C4 and he is consulted to   Neurology for further evaluation and treatment of this.  He denies any   significant shortness of breath.  He denies any fever or chills.  No chest pain   or palpitations or other problems.    REVIEW OF SYSTEMS:  Times 12 as above.    PHYSICAL EXAMINATION:  VITAL SIGNS:  Blood pressure 130/74, pulse is 67 and temp 98.7.  GENERAL APPEARANCE:  He is alert, in no acute distress.  He is in bed with a   cervical collar.    HEART:  Regular rhythm and rate.  LUNGS:  Clear for the most part.    ABDOMEN:  Soft, nontender.  Bowel sounds present.  EXTREMITIES:  He has multiple bruises and skin tears.  NEUROLOGIC:  Unchanged from yesterday.    LABS:  Remarkable for an H and H of 11.8 and 36.2, platelet 253.  Potassium was   3.3 with a CO2 of 30, and a BUN of 22, creatinine 2.24, albumin 2.7.    IMPRESSION:    1. Status post multiple falls due to right-sided drift and near syncope.  2. Chronic obstructive pulmonary disease with some bronchospasm that is   improved.  3. Hypertension.  4. Chronic kidney disease and acute kidney injury on chronic kidney disease,   stage at least 3.    5. He has mild chronic anemia.  6. History of tobacco abuse and chronic obstructive pulmonary disease.  7. Cerebrovascular accident in the past.  8. C4 body fracture.  9. Dyslipidemia.  10. History of seizure disorder.  11. Protein-calorie malnutrition and deconditioning.    12. He has a  history of BPH.  13. Anxiety, depression.  14. History of pulmonary nodules in the past.  15. Cardiac arrhythmias including ventricular tachycardia .  16. He has also significant fatigue and hypokalemia.    PLAN:    1. Continue IV fluids with potassium replacement.   2. Will get PT/OT to follow.    3. We are waiting for Neurology to evaluate the patient.  4. Will continue home meds.   5. Will put on DVT prophylaxis with Lovenox.  6. Will continue with seizure medications, antihypertensives.        ______________________________  MD TICO Kendall/RAMONAS  DD:  11/08/2022  Time:  05:58PM  DT:  11/08/2022  Time:  06:59PM  Job #:  [id number]      PROGRESS NOTE

## 2022-11-09 NOTE — NURSING
Nurse walked into room and found pt on the floor on his right side next to the bed. Pt is AAOx4, no s/s of distress noted. Denies pain. Notified charge nurse and manager. Place sheet under pt and picked up him and put him back into the bed. Daughter Radha was notified. Dr. Sarmiento notified.

## 2022-11-09 NOTE — PROGRESS NOTES
11/09/22 1325   Discharge Assessment   Assessment Type Discharge Planning Assessment   Confirmed/corrected address, phone number and insurance Yes   Confirmed Demographics Correct on Facesheet   Source of Information patient   Communicated VIVEK with patient/caregiver Date not available/Unable to determine   Reason For Admission fall   Lives With spouse   Do you expect to return to your current living situation? Yes   Do you have help at home or someone to help you manage your care at home? Yes   Who are your caregiver(s) and their phone number(s)? lives with wife Laura ([phone]) and daughter Radha lives next door   Prior to hospitilization cognitive status: Unable to Assess   Current cognitive status: Alert/Oriented   Walking or Climbing Stairs Difficulty ambulation difficulty, requires equipment   Mobility Management walker   Dressing/Bathing Difficulty none   Home Accessibility wheelchair accessible   Home Layout Able to live on 1st floor   Equipment Currently Used at Home walker, rolling   Readmission within 30 days? No   Patient currently being followed by outpatient case management? No   Do you currently have service(s) that help you manage your care at home? No   Do you take prescription medications? Yes   Do you have prescription coverage? Yes   Do you have any problems affording any of your prescribed medications? No   Is the patient taking medications as prescribed? yes   Who is going to help you get home at discharge? wife   How do you get to doctors appointments? family or friend will provide   Are you on dialysis? No   Do you take coumadin? No   Discharge Plan A Home with family   Discharge Plan B Home Health   DME Needed Upon Discharge  none   Discharge Plan discussed with: Patient   Discharge Barriers Identified None     Lives with wife who helps with his care, daughter Radha lives next door and is a nurse with [employer]. Fills prescriptions at [facility].

## 2022-11-09 NOTE — PT/OT/SLP PROGRESS
Physical Therapy Treatment    Patient Name:  Tim Meneses   MRN:  [id number]    Recommendations:     Discharge Recommendations:  outpatient PT, rehabilitation facility (TBD)   Discharge Equipment Recommendations: none   Barriers to discharge:  medical dx    Assessment:     Tim Meneses is a 77 y.o. male admitted with a medical diagnosis of Fall, C4 Vertebral Body Fracture.  He presents with the following impairments/functional limitations:  weakness, impaired endurance, impaired functional mobility, gait instability, impaired balance, visual deficits, decreased coordination .    Rehab Prognosis: Good; patient would benefit from acute skilled PT services to address these deficits and reach maximum level of function.    Recent Surgery: * No surgery found *      Plan:     During this hospitalization, patient to be seen daily to address the identified rehab impairments via gait training, therapeutic activities and progress toward the following goals:    Plan of Care Expires:  12/08/22    Subjective     Chief Complaint: HA, double vision  Patient/Family Comments/goals: get better  Pain/Comfort:         Objective:     Communicated with RN prior to session.  Patient found HOB elevated with   upon PT entry to room.     General Precautions: Standard,     Orthopedic Precautions:spinal precautions   Braces:    Respiratory Status: Nasal cannula, flow 2 L/min     Functional Mobility:  Bed Mobility:     Supine to Sit: stand by assistance  Sit to Supine: minimum assistance  Transfers:     Sit to Stand:  contact guard assistance with rolling walker  Gait: Pt amb from EOB<>bathroom cga with RW. Ataxic /shuffle gait. Demos excessive hip ext and decreased foot clearance.  Balance: CGA      AM-PAC 6 CLICK MOBILITY          Treatment & Education:  Pt sat EOB supervision roughly 10' before standing up secondary to dizziness and double vision. Pt stood over toilet with RW to urinate cga. Pt also stood at sink wit wash hands sba. Pt  performed hip flexion EOB 20x1.    Patient left HOB elevated with all lines intact, call button in reach, and family present..    GOALS:   Multidisciplinary Problems       Physical Therapy Goals          Problem: Physical Therapy    Goal Priority Disciplines Outcome Goal Variances Interventions   Physical Therapy Goal     PT, PT/OT      Description: Goals to be met by: 2022     Patient will increase functional independence with mobility by performin. Supine to sit with [place]  2. Sit to supine with [place]  3. Sit to stand transfer with Modified [place]  4. Gait  x 200 feet with Stand-by Assistance using Rolling Walker.   5. Ascend/descend 4 stair with right Handrails Stand-by Assistance using RW vs LRAD.                          Time Tracking:     PT Received On: 22  PT Start Time: 1135     PT Stop Time: 1205  PT Total Time (min): 30 min     Billable Minutes: Therapeutic Activity 20 and Therapeutic Exercise 10    Treatment Type: Treatment  PT/PTA: PTA     PTA Visit Number: 1     2022

## 2022-11-10 NOTE — PLAN OF CARE
Spoke to pt and his dgt Radha is present in room. Pt stated he would like to go to [facility] at [place]. Dgt is in agreement. OT ordered for eval.  Referral sent to [facility] via [facility].

## 2022-11-10 NOTE — PROCEDURES
Speech Language Pathology Department  Modified Barium Swallow Study    Patient Name:  Tim Meneses   MRN:  [id number]  Diagnosis: syncope, frequent falls, seizure d/o, malnutrition, deconditioning    Recommendations:     General recommendations:  GI consult  Repeat MBS study: as clinically indicated  Diet recommendations:  NPO, Liquid Diet Level: NPO   Swallow strategies/precautions: medications crushed in puree as tolerated  General precautions: Standard, aspiration (GERD)    History:     A Modified Barium Swallow Study was completed to assess the efficiency of his/her swallow function, rule out aspiration and make recommendations regarding safe dietary consistencies, effective compensatory strategies, and safe eating environment.     Past Medical History:   Diagnosis Date    COPD (chronic obstructive pulmonary disease)     Hepatitis     Hypercholesterolemia     Hypertension     Malnutrition related to chronic disease     Seizure     Subacromial impingement of right shoulder     Ventricular tachycardia        Home Diet: Regular and thin liquids   Current Method of Nutrition: NPO    Patient complaint: aspiration event with O2 desaturation during noon meal, poor appetite, weight loss    Subjective:     Patient awake, alert, and cooperative.    Spiritual/Cultural/Roman Catholic Beliefs/Practices that affect care: no    Pain/Comfort: Pain Rating 1: 0/10    Respiratory Status: nasal cannula    Fluoroscopic Results:     Oral Musculature Evaluation:  Oral Musculature: WFL  Secretion Management: problems swallowing secretions  Voice Prior to PO Intake: hoarse    Visualization  Patient was seen in the lateral view    Oral Phase:   Adequate lip closure  Adequate bolus formation  Adequate anterior-posterior transport  Reduced bolus control    Pharyngeal Phase:   Swallow delay with spill to the valleculae  Adequate epiglottic deflection  Adequate hyolaryngeal excursion  Aspiration of esophageal backflow, cough response present, but  NOT fully effective for clearing contrast material from the trachea/laryngeal vestibule  Consistency Laryngeal Penetration Aspiration   Mildly thick liquid by spoon None None   Puree None None   Thin liquid by spoon None None   Thin liquid by cup None None     Cervical Esophageal Phase:   retrograde movement of the bolus to the pharynx resulting in aspiration of contrast material    Assessment:     Pt present with mild oropharyngeal dysphagia negatively impacted by likely esophageal dysfunction as retrograde movement of contrast material resulting in aspiration visualized.    Plan:     Patient to be seen:   (PRN)   Plan of Care reviewed with:  patient, daughter   SLP Follow-Up:  Yes (PRN)      Time Tracking:     SLP Treatment Date:   11/10/22  Speech Start Time:  1515  Speech Stop Time:  1545     Speech Total Time (min):  30 min    Billable minutes:   Motion Fluoroscopic Evaluation, Video Recording, 30 minutes      11/10/2022

## 2022-11-10 NOTE — PROGRESS NOTES
"[facility] - [address] [place] [address]  Neurosurgery  Progress Note    Subjective:     Interval History: He is sitting up in bed, NAD. Pain controlled. Exam unchanged.    History of Present Illness: Patient is a 76 yo male with PMH of seizure d/o, HTN, recurrent bradycardia s/p pacemaker placement and COPD, who presents to the ED accompanied by his daughter for recurrent falls. Daughter states that the patient has fallen at least 7 times over the last 2-3 days. Pt complains of double vision and states his body is "pulling him towards the right and that he always falls to the right." Yesterday pt felt dizzy. No LOC. Pt also c/o SOB, which is chronic. Per daughter, the patient has been evaluated by his PCP, cardiologist and neurologist (Dr. Murguia), and was thought that his falls are caused by medication side effect and so recent adjustments have been made. She feels that his falls and unsteady gait have worsened following the medication adjustment.     CT of his head was negative for acute intracranial findings. His CT neck was significant for C4 vertebral body fx. Dr. Nathan has been consulted for evaluation and treatment recommendations. Plan to treat conservatively in an aspen collar with plans for MRI brain and C spine to further assess falls.    Post-Op Info:  * No surgery found *          Medications:  Continuous Infusions:  Scheduled Meds:   aspirin  81 mg Oral Daily    atorvastatin  40 mg Oral Daily    carbidopa-levodopa  mg  1 tablet Oral TID    carvediloL  25 mg Oral BID    EScitalopram oxalate  5 mg Oral Daily    fluticasone propionate  1 spray Each Nostril Daily    furosemide  40 mg Oral Daily    lacosamide  100 mg Oral Q12H    lamoTRIgine  200 mg Oral BID    NIFEdipine  60 mg Oral Daily    rivaroxaban  10 mg Oral Daily with dinner    zinc oxide-cod liver oil   Topical (Top) BID     PRN Meds:acetaminophen, albuterol, cloNIDine     Review of Systems  Objective:   Unchanged from " previous    Weight: 68 kg (150 lb)  Body mass index is 24.96 kg/m².  Vital Signs (Most Recent):  Temp: 98.2 °F (36.8 °C) (11/10/22 0705)  Pulse: 70 (11/10/22 0705)  Resp: 20 (11/10/22 0705)  BP: (!) 169/82 (11/10/22 0705)  SpO2: (!) 93 % (11/10/22 0705)   Vital Signs (24h Range):  Temp:  [97.7 °F (36.5 °C)-98.4 °F (36.9 °C)] 98.2 °F (36.8 °C)  Pulse:  [60-93] 70  Resp:  [18-20] 20  SpO2:  [93 %-97 %] 93 %  BP: (110-169)/(65-84) 169/82     Neurosurgery Physical Exam  Constitutional: He appears well-developed and well-nourished. No distress.      Eyes: Pupils are equal, round, and reactive to light. EOM are normal. He c/o double vision in bilateral eyes, central     Cardiovascular: Intact distal pulses.      Abdominal: Soft. Bowel sounds are normal.      Psych/Behavior: He is alert. He is oriented to person, place, and time. He has a normal mood and affect.      Musculoskeletal:        Neck: Range of motion is limited. ROM severity is In rigid collar.        Back: Range of motion is full.        Right Upper Extremities: Range of motion is full. Muscle strength is 5/5.        Left Upper Extremities: Range of motion is full. Muscle strength is 5/5.       Right Lower Extremities: Range of motion is full. Muscle strength is 5/5.        Left Lower Extremities: Range of motion is full. Muscle strength is 5/5.      Neurological:        Sensory: There is no sensory deficit in the extremities.        DTRs: Tricep reflexes are 2+ on the right side and 2+ on the left side. Bicep reflexes are 2+ on the right side and 2+ on the left side. Patellar reflexes are 2+ on the right side and 2+ on the left side. Achilles reflexes are 1+ on the right side and 1+ on the left side. He displays no Babinski's sign on the right side. He displays no Babinski's sign on the left side.          -clonus bilateral, -galvan's  Significant Labs:  Recent Labs   Lab 11/10/22  0413      K 3.8   CO2 27   BUN 11.3   CREATININE 1.32*   CALCIUM 8.6*      Recent Labs   Lab 11/10/22  0413   WBC 8.3   HGB 12.2*   HCT 37.4*        No results for input(s): LABPT, INR, APTT in the last 48 hours.  Microbiology Results (last 7 days)       ** No results found for the last 168 hours. **            Significant Diagnostics:    MRI Cervical Spine Without Contrast [[id number]] Resulted: 11/09/22 1656   Order Status: Completed Updated: 11/09/22 1658   Narrative:     EXAMINATION:   MRI CERVICAL SPINE WITHOUT CONTRAST     CLINICAL HISTORY:   Spine fracture, cervical, traumatic;.     TECHNIQUE:   Multiplanar, multisequence MR images of the cervical spine were acquired without the administration of contrast.     COMPARISON:   CT scan cervical spine dated 11/07/2022     FINDINGS:   The vertebral body heights are well maintained.  There is an anterior bridging osteophyte seen the level of C4-C5 and C5-C6. No significant edema seen at the level of C4 in the area of suspected fracture.  Fracture is most likely old.  No evidence of acute fracture or dislocation is seen.  Cord signal appears normal.  Examination is limited due to significant motion artifact.     There is a broad-base disc bulge seen at C4-C5 and C5-C6 which seemed to cause some mild spinal stenosis at C4-C5.  Canal at C4-C5 measures 9 mm.  Canal at the level of C5-C6 is not significantly narrowed and measures 12 mm.    Impression:       No edema seen in the suspected area of C4 fracture suggesting that the fracture is old.     Anterior bridging osteophytes at C4-C5 and C5-C6 with old fracture of the osteophyte along the anterior inferior aspect of C4     Some degenerative changes in the lower cervical spine      Assessment/Plan:     Active Diagnoses:    Diagnosis Date Noted POA    Seizure disorder [G40.909] 11/08/2022 Unknown    Imbalance [R26.89] 11/01/2022 Yes    Frequent falls [R29.6] 11/01/2022 Not Applicable      Problems Resolved During this Admission:     His exam is unchanged.  MRI c spine shows no cord  signal changes; C4 fx is chronic  Will order soft collar for comfort  No surgery planned  Neurology following, continue their recommendations  We will sign off    MODESTO Salazar  Neurosurgery  [facility] - [address] [place] [address]

## 2022-11-10 NOTE — PROGRESS NOTES
[facility]                       [address] [place] [address]    PATIENT NAME:       JESSY BRONSON  YOB: 1945  CSN:                [id number]   MRN:                [id number]  ADMIT DATE:         11/07/2022 10:05:00  PHYSICIAN:          Jama Sarmiento MD                            PROGRESS NOTE    DATE:      SUBJECTIVE:  A 77-year-old white male.  He is doing fair.  He had a mild fall   with no injuries.  He is set up for a CT of the brain, MRI of the brain and   C-spine.  Continues to work with Physical Therapy.  We are waiting for placement   to either rehab or SNF.    REVIEW OF SYSTEMS:  As above.    OBJECTIVE:  VITAL SIGNS:  Blood pressure 136/70, pulse 68, and temp 98.4.  GENERAL APPEARANCE:  He is alert, in no acute distress.    HEART:  Regular rhythm and rate.  LUNGS:  Clear.    ABDOMEN:  Soft, nontender.  EXTREMITIES:  Multiple bruises and skin tears.  NEUROLOGIC:  Unchanged.    LABORATORY DATA:  There are no new labs.    IMPRESSION:    1. Status post multiple falls.   2. Near syncope.   3. Chronic obstructive pulmonary disease.   4. Hypertension.   5. Dyslipidemia.   6. Chronic kidney disease.   7. Hypokalemia.   8. Mild chronic anemia. .  10. C4 burst fracture.  11. Seizure disorder.  12. Protein calorie malnutrition.  13. Deconditioning.   14. Benign prostatic hypertrophy.  15. Anxiety and depression.  16. Cardia arrhythmias in the past.    PLAN:  Will continue PT/OT.  We are waiting for placement.  Continue with DVT   prophylaxis.        ______________________________  Jama Sarmiento MD    TICO/AQS  DD:  11/09/2022  Time:  08:56PM  DT:  11/09/2022  Time:  09:17PM  Job #:  [id number]      PROGRESS NOTE

## 2022-11-10 NOTE — PROGRESS NOTES
[facility]                       [address]                      [place] [address]    PATIENT NAME:       JESSY BRONSON  YOB: 1945  CSN:                [id number]   MRN:                [id number]  ADMIT DATE:         11/07/2022 10:05:00  PHYSICIAN:          Jama Sarmiento MD                            PROGRESS NOTE    DATE:      HISTORY:  A 77-year-old male.  He is doing fairly decent.  For some reason, he   got out of bed yesterday and he had a very mild fall, but no injuries.  We are   waiting for placement, and will be rehab versus SNF.  His blood pressure has   been slightly elevated as of this morning.  He has been afebrile.  His sats in   the low 90s to mid 90s with some oxygen.  No significant issues.    REVIEW OF SYSTEMS:  x12 as above.    PHYSICAL EXAMINATION:  VITAL SIGNS:  Blood pressure 169/82, pulse 70, temp 98.2.  GENERAL APPEARANCE:  He is alert, in no acute distress.    HEART:  Regular rhythm and rate.  LUNGS:  They are clear this morning.    ABDOMEN:  Soft, nontender.    EXTREMITIES:  Multiple bruises and skin tears.    NEUROLOGIC:  Unchanged.  He does have a cervical collar.    LABS:  There is a white cell count of 8.3, H and H 12.2 and 37.4.  His BUN is   11, creatinine 1.32, albumin 2.8.    IMPRESSION:    1. Status post multiple falls, with C4 fracture.  2. Near syncope.  3. Chronic obstructive pulmonary disease.  4. Hypertension.  5. Dyslipidemia.  6. Chronic kidney disease, stage 3.    7. History of hyperkalemia that resolved.  8. Mild chronic anemia.  9. History of seizure disorder.  10. Protein calorie malnutrition.  11. Deconditioning.  12. BPH.  He does have some urinary issues at the present time.  13. Anxiety, depression.  14. Cardiac arrhythmias in the past.   15. History of stroke in the past.  16. Acute kidney injury.    PLAN:    1. The patient will continue with PT, OT.  2. We are trying to place him  either in SNF or rehab, depending on his   functioning.  3. He already saw Neurology, and work-up has been so far negative for any issues   that cause his falls and drifting.    4. Will continue to monitor closely.   5. Will check urine and PSA.        ______________________________  MD TICO Kendall/GEORGETTE  DD:  11/10/2022  Time:  08:34AM  DT:  11/10/2022  Time:  09:26AM  Job #:  [id number]      PROGRESS NOTE

## 2022-11-10 NOTE — PT/OT/SLP PROGRESS
SLP orders received for swallow evaluation after aspiration event with O2 desaturation during noon meal.  Chart reviewed and nursing/pt/family consulted.  Pt denies difficulty swallowing, but does report poor appetite and rapid, unintentional weight loss over the last 1-2 years.  Oral motor exam unremarkable. SLP to complete MBS study for comprehensive assessment of swallow function.

## 2022-11-10 NOTE — PT/OT/SLP PROGRESS
Physical Therapy      Patient Name:  Tim Meneses   MRN:  [id number]    Pt working with speech therapy upon arrival. Pt leaving floor for xray, will f/u tomorrow.

## 2022-11-10 NOTE — NURSING
Daughter reported pt started coughing after drinking liquids. Nurse notes crackles to bilat lungs and pt coughing up yellow thin liquids. Pt o2 was 81 on 1 L O2 per NC. Bumped pt to 5 L NC and recovered  to 98%. Respiratory and Dr. Sarmiento called.

## 2022-11-11 PROBLEM — R13.10 DYSPHAGIA: Status: ACTIVE | Noted: 2022-01-01

## 2022-11-11 NOTE — NURSING
WOCN re-eval of arm skin tears and buttock crease lesion.  See photo.   Some changes to care noted and made nursing aware.     11/11/22 1018        Altered Skin Integrity 11/08/22 1130 Left anterior;lower;proximal Arm   Date First Assessed/Time First Assessed: 11/08/22 1130   Altered Skin Integrity Present on Admission: yes  Side: Left  Orientation: anterior;lower;proximal  Location: (c) Arm   Wound Image    Dressing Appearance Intact;Dried drainage   Drainage Amount Scant   Drainage Characteristics/Odor Serosanguineous   Appearance   (scab)   Periwound Area Ecchymotic   Wound Edges Irregular        Altered Skin Integrity 11/08/22 1130 Right anterior;lower;proximal Arm   Date First Assessed/Time First Assessed: 11/08/22 1130   Side: Right  Orientation: anterior;lower;proximal  Location: (c) Arm   Wound Image    Drainage Amount Small   Drainage Characteristics/Odor Serosanguineous;No odor   Appearance Red;Moist   Red (%), Wound Tissue Color 100 %   Periwound Area Ecchymotic   Wound Edges Approximated   Wound Length (cm) 3 cm   Wound Width (cm) 0.6 cm   Wound Surface Area (cm^2) 1.8 cm^2        Altered Skin Integrity 11/08/22 1130 medial Coccyx    Date First Assessed/Time First Assessed: 11/08/22 1130   Altered Skin Integrity Present on Admission: yes  Orientation: medial  Location: Coccyx  Is this injury device related?: No  Primary Wound Type: (c)    Dressing Appearance Open to air   Drainage Amount None   Appearance   (silver white)   Periwound Area Dry;Intact   Wound Edges Approximated   Wound Length (cm) 1.5 cm   Wound Width (cm) 0.8 cm   Wound Surface Area (cm^2) 1.2 cm^2   Care Cleansed with:;Wound cleanser;Skin Barrier;Applied:   Dressing Gauze   Hygiene Care   Bathing/Skin Care bath, complete

## 2022-11-11 NOTE — PT/OT/SLP PROGRESS
Physical Therapy Treatment    Patient Name:  Tim Meneses   MRN:  [id number]    Recommendations:     Discharge Recommendations:  outpatient PT, rehabilitation facility (TBD)   Discharge Equipment Recommendations: none   Barriers to discharge:  medical status    Assessment:     Tim Meneses is a 77 y.o. male admitted with a medical diagnosis of Fall, C4 Vertebral Body Fracture.  He presents with the following impairments/functional limitations:  weakness, impaired endurance, impaired functional mobility, gait instability, impaired balance, decreased coordination, decreased safety awareness .    Rehab Prognosis: Good; patient would benefit from acute skilled PT services to address these deficits and reach maximum level of function.    Recent Surgery: Procedure(s) (LRB):  EGD, WITH CLOSED BIOPSY (N/A)  ESOPHAGOSCOPY, USING BOUGIE, WITH DILATION (N/A) Day of Surgery    Plan:     During this hospitalization, patient to be seen daily to address the identified rehab impairments via gait training, therapeutic activities, therapeutic exercises and progress toward the following goals:    Plan of Care Expires:  12/08/22    Subjective     Chief Complaint: sleepy  Patient/Family Comments/goals: get better  Pain/Comfort:         Objective:     Communicated with RN prior to session.  Patient found HOB elevated with   upon PT entry to room.     General Precautions: Standard,     Orthopedic Precautions:spinal precautions   Braces:    Respiratory Status: Room air  BP post activity: 185/84  HR: 80     Functional Mobility:  Bed Mobility:     Supine to Sit: minimum assistance  Sit to Supine: minimum assistance  Transfers:     Sit to Stand:  contact guard assistance with rolling walker  Gait: Pt amb around room Beth with RW. Unsteadiness noted. Cuing needed for safety awareness.  Balance: Beth/modA      AM-PAC 6 CLICK MOBILITY          Treatment & Education:  Performed toe taps in RW 10x1 modA. L and R lateral stepping with RW  Beth.    Patient left HOB elevated with all lines intact, call button in reach, and family present..    GOALS:   Multidisciplinary Problems       Physical Therapy Goals          Problem: Physical Therapy    Goal Priority Disciplines Outcome Goal Variances Interventions   Physical Therapy Goal     PT, PT/OT      Description: Goals to be met by: 2022     Patient will increase functional independence with mobility by performin. Supine to sit with [place]  2. Sit to supine with [place]  3. Sit to stand transfer with Modified [place]  4. Gait  x 200 feet with Stand-by Assistance using Rolling Walker.   5. Ascend/descend 4 stair with right Handrails Stand-by Assistance using RW vs LRAD.                          Time Tracking:     PT Received On: 22  PT Start Time: 1444     PT Stop Time: 1507  PT Total Time (min): 23 min     Billable Minutes: Gait Training 8 and Therapeutic Activity 15    Treatment Type: Treatment  PT/PTA: PTA     PTA Visit Number: 2     2022

## 2022-11-11 NOTE — PROGRESS NOTES
[facility]                       [address] [id number]    PATIENT NAME:       JESSY BRONSON  YOB: 1945  CSN:                [id number]   MRN:                [id number]  ADMIT DATE:         11/07/2022 10:05:00  PHYSICIAN:          Jama Sarmiento MD                            PROGRESS NOTE    DATE:      HISTORY:  A 77-year-old male.  He is doing fair at the present time.  He had a   Speech Therapy evaluation after having projectile vomiting, and he was kept   n.p.o. after being at risk for aspiration.  He seemed to have some esophageal   dysfunction, with retrograde movement of contrast resulting in the aspiration.    GI was consulted, and he is scheduled for an EGD.  His breathing issues have   settled down.  Chest x-ray showed no infiltrates.  Blood pressure has been   slightly elevated.  He has been afebrile.  He is at 2 L, with the sats in the   mid to low 90s.  No other significant problems or complaints.    REVIEW OF SYSTEMS:  x12 as above.    PHYSICAL EXAMINATION:  VITAL SIGNS:  Latest blood pressure 172/71, pulse 79, and temp 98.4.  GENERAL APPEARANCE:  He is alert, in no acute distress.  HEART:  Regular rhythm and rate.  LUNGS:  He has a few rhonchi.    ABDOMEN:  Soft, nontender.  EXTREMITIES:  Multiple bruises and skin tears.  NEUROLOGIC:  Unchanged.  He has a cervical collar.    LABS:  There are no new labs.    IMPRESSION:    1. Dysphagia with possible aspiration.    2. History of multiple falls with C4 fracture.  3. Near syncope.  4. Chronic obstructive pulmonary disease.  5. Hypertension.  6. Dyslipidemia.  7. 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  8. Mild chronic anemia.  9. Seizure disorder.  10. Protein calorie malnutrition.  11. Deconditioning.  12. BPH.  13. Anxiety, depression.  14. Cardiac arrhythmias in the past.  15. History of stroke in the past.  16. History of tobacco abuse.  17.  Acute kidney injury.    PLAN:  The patient for an EGD today.  Depending on that, will continue his   treatment for his swallowing issues.   looking for placement in SNF,   most likely, due to his weakness.  Urine was unremarkable yesterday.  Will   check some labs tomorrow, including a PSA.  He will continue with DVT   prophylaxis.        ______________________________  MD TICO Kendall/GEORGETTE  DD:  11/11/2022  Time:  08:18AM  DT:  11/11/2022  Time:  08:32AM  Job #:  [id number]      PROGRESS NOTE

## 2022-11-11 NOTE — ANESTHESIA PREPROCEDURE EVALUATION
11/11/2022  Tim Meneses is a 77 y.o., male with PMH of seizure d/o, HTN, recurrent bradycardia s/p pacemaker placement and COPD, has fallen at least 7 times over the last 2-3 days, CT of his head was negative for acute intracranial findings. His CT neck was significant for C4 vertebral body fx. Dr. Nathan has been consulted for evaluation and treatment recommendations. Plan to treat conservatively in an aspen collar with plans for MRI brain and C spine to further assess falls. He seemed to have some esophageal  dysfunction, with retrograde movement of contrast resulting in the aspiration.    GI was consulted, and he is scheduled for an EGD.       Pre-op Assessment    I have reviewed the Patient Summary Reports.     I have reviewed the Nursing Notes. I have reviewed the NPO Status.   I have reviewed the Medications.     Review of Systems      Physical Exam  General: Well nourished and Cooperative    Airway:  Mallampati: II   Mouth Opening: Normal  TM Distance: Normal  Tongue: Normal  Neck ROM: Normal ROM    Chest/Lungs:  Clear to auscultation    Heart:  Rate: Normal        Anesthesia Plan  Type of Anesthesia, risks & benefits discussed:    Anesthesia Type: Gen Natural Airway  Intra-op Monitoring Plan: Standard ASA Monitors  Induction:  IV  Informed Consent: Informed consent signed with the Patient and all parties understand the risks and agree with anesthesia plan.  All questions answered.   ASA Score: 3  Day of Surgery Review of History & Physical: H&P Update referred to the surgeon/provider.    Ready For Surgery From Anesthesia Perspective.     I explained anesthesia plan to patient/responsbile party if available.  Anesthesia consent done going over the material facts, risks, complications & alternatives, obtained which includes the possibility of altering the anesthesia plan.  I reviewed problem list,  "appropriate labs, any workup, Xray, EKG etc noted below.  Patients condition is satisfactory to proceed with anesthesia plan unless otherwise noted (see anesthesia chart for details of the anesthesia plan carried out).      Pre-operative evaluation for Procedure(s) (LRB):  EGD (ESOPHAGOGASTRODUODENOSCOPY) (N/A)    BP (!) 172/71   Pulse 79   Temp 36.9 °C (98.4 °F) (Oral)   Resp 17   Ht 5' 5" (1.651 m)   Wt 68 kg (150 lb)   SpO2 (!) 94%   BMI 24.96 kg/m²     Patient Active Problem List   Diagnosis    Intractable localization-related epilepsy    Imbalance    Positive Romberg test    Frequent falls    Physical deconditioning    Seizure disorder       Review of patient's allergies indicates:  No Known Allergies    Current Outpatient Medications   Medication Instructions    albuterol (PROVENTIL/VENTOLIN HFA) 90 mcg/actuation inhaler 2 puffs, Inhalation, Every 4 hours PRN    aspirin (ECOTRIN) 81 mg, Oral, Daily    atorvastatin (LIPITOR) 40 MG tablet 1 tablet, Oral, Daily    carvediloL (COREG) 25 MG tablet 1 tablet, Oral, 2 times daily    cetirizine (ZYRTEC) 10 mg, Oral, Daily    cloNIDine (CATAPRES) 0.1 MG tablet 1 tablet, Oral, Every 8 hours PRN, Take 1 tablet orally every 8hrs as needed for SBP greater than 175 or DBP greater than 100    EScitalopram oxalate (LEXAPRO) 5 MG Tab 1 tablet, Oral, Daily    fluticasone propionate (FLONASE) 50 mcg/actuation nasal spray 1 spray, Each Nostril, Daily    furosemide (LASIX) 40 MG tablet 1 tablet, Oral, Daily    lacosamide (VIMPAT) 100 mg, Oral, Every 12 hours    lamoTRIgine (LAMICTAL) 200 MG tablet 1 po q am; 1.5 po qhs    NIFEdipine (PROCARDIA-XL) 60 mg, Oral, Daily       Past Surgical History:   Procedure Laterality Date    APPENDECTOMY      CARDIAC PACEMAKER PLACEMENT         Social History     Socioeconomic History    Marital status:    Tobacco Use    Smoking status: Every Day     Packs/day: 1.00     Types: Cigarettes     Start date: 1961    " Smokeless tobacco: Never   Substance and Sexual Activity    Alcohol use: Yes     Comment: 1-2 times per year    Drug use: Never    Sexual activity: Not Currently       Lab Results   Component Value Date    WBC 8.3 11/10/2022    HGB 12.2 (L) 11/10/2022    HCT 37.4 (L) 11/10/2022    MCV 86.2 11/10/2022     11/10/2022          BMP  Lab Results   Component Value Date    HCT 37.4 (L) 11/10/2022     11/10/2022    K 3.8 11/10/2022    BUN 11.3 11/10/2022    CREATININE 1.32 (H) 11/10/2022    CALCIUM 8.6 (L) 11/10/2022        INR  No results for input(s): PT, INR, PROTIME, APTT in the last 72 hours.        Diagnostic Studies:      EKG:  Results for orders placed or performed during the hospital encounter of 11/07/22   EKG 12-lead    Collection Time: 11/07/22 10:12 AM    Narrative    Test Reason : R42,    Vent. Rate : 068 BPM     Atrial Rate : 069 BPM     P-R Int : 176 ms          QRS Dur : 078 ms      QT Int : 404 ms       P-R-T Axes : -03 055 -62 degrees     QTc Int : 429 ms    Atrial-paced rhythm with occasional ventricular-paced complexes  Baseline Artifact  ST and T wave abnormality, consider inferior ischemia  ST and T wave abnormality, consider anterolateral ischemia  Abnormal ECG  No previous ECGs available  Confirmed by Josh Hatfield MD ([id number]) on 11/7/2022 11:15:44 AM    Referred By:             Confirmed By:Josh Hatfield MD          .

## 2022-11-11 NOTE — TRANSFER OF CARE
"Anesthesia Transfer of Care Note    Patient: Tim Meneses    Procedure(s) Performed: Procedure(s) (LRB):  EGD, WITH CLOSED BIOPSY (N/A)  ESOPHAGOSCOPY, USING BOUGIE, WITH DILATION (N/A)    Patient location: GI    Anesthesia Type: MAC    Transport from OR: Transported from OR on room air with adequate spontaneous ventilation    Post pain: adequate analgesia    Post assessment: no apparent anesthetic complications    Post vital signs: stable    Level of consciousness: sedated    Nausea/Vomiting: no nausea/vomiting    Complications: none    Transfer of care protocol was followed      Last vitals:   Visit Vitals  BP (!) 160/80 (BP Location: Left arm, Patient Position: Lying)   Pulse 79   Temp 36.5 °C (97.7 °F) (Temporal)   Resp (!) 22   Ht 5' 5" (1.651 m)   Wt 68 kg (150 lb)   SpO2 (!) 93%   BMI 24.96 kg/m²     "

## 2022-11-11 NOTE — OP NOTE
EGD Report  Date of procedure 11/11/2022  Referring Physician:  Torsten    Surgeon: Lex Mejia    ASA:  3    Medications: Per anesthesia    Indication:  Vomiting, dysphagia, profound weight loss    Procedure: EGD biopsy and esophageal dilation    Description of the Procedure: The patient was brought back to the endoscopy suite where the risks, benefits, and alternatives of the procedure were described in detail. The patient was given the opportunity to ask questions and then signed informed consent. Patient was positioned in the left lateral decubitus position, continuous monitoring was initiated, and supplemental oxygen was provided via nasal cannula. Bite block was placed. Adequate sedation was achieved with the above mentioned medications as documented in chart and then titrated during the entire procedure. Under direct visualization the gastroscope was introduced through the oropharynx into the esophagus. The scope was advanced into the stomach and to the second portion of the duodenum. Scope was withdrawn and the mucosa was carefully examined. The entire gastric mucosa was examined, including the fundus with retroflexion. Air was evacuated from the stomach and the scope was withdrawn into the esophagus. The entire esophageal mucosa was examined. The procedure was completed. The patient tolerated the procedure well and was transferred to the recovery area in stable condition.     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  Mild antral gastritis but fairly significant erosive bulbar duodenitis prompted antral biopsy to rule out Helicobacter.    Impression and Recommendations:   We dilated the patient for his dysphagia concerns.  He may house an occult stricture at the squamocolumnar junction, but his esophagoscopy was otherwise reassuring as was his cardia and fundus.  He did have  some antral gastritis, and fairly significant erosive bulbar duodenitis.  We obtained antral biopsy to rule out Helicobacter, and we dilated his esophagus to 54 [place].  We will maintain him on a proton pump inhibitor, and monitor serial exams.  Hopefully his vomiting concerns revolve around food impaction, and he will be better.  His 100 lb weight loss is unsettling.  He did have fairly significant erosive bulbar duodenitis, and it might be worth him undergoing repeat upper endoscopy in a couple of months to assure that has healed, and to biopsy it if it has not.    We will not round on him over the weekend, but will check back on him Monday if he remains in-house.  If he is discharged, he certainly needs to be maintained on a daily proton pump inhibitor, and limit any exposure to nonsteroidals.  We do await the results of biopsies regarding Helicobacter.    Lex Mejia

## 2022-11-11 NOTE — PLAN OF CARE
Problem: Occupational Therapy  Goal: Occupational Therapy Goal  Description: Goals to be met by: 11/25     Patient will increase functional independence with ADLs by performing:    LE Dressing with Modified [place].  Grooming while standing at sink with Stand-by Assistance.  Toileting from toilet with Stand-by Assistance for hygiene and clothing management.   Toilet transfer to toilet with Stand-by Assistance.    Outcome: Ongoing, Progressing

## 2022-11-11 NOTE — PT/OT/SLP EVAL
"Occupational Therapy   Evaluation    Name: Tim Meneses  MRN: [id number]  Admitting Diagnosis:  Falls, C4 fx  Recommendations:     Discharge Recommendations: rehabilitation facility  Discharge Equipment Recommendations:   (TBD pending progress c therapy)  Barriers to discharge:  Other (Comment) (Severity of deficits; Fall risk)    Assessment:     Tim Meneses is a 77 y.o. male with a medical diagnosis of Falls, C4 fx. Performance deficits affecting function: weakness, gait instability, impaired endurance, impaired balance, impaired self care skills, impaired functional mobility, visual deficits.  Pt reported multiple falls at home- typically to R side 2/2 dizziness. Pt denied double vision during session but reported it looked like objects were "moving". Pt presented c shuffling gait pattern and posterior lean in standing and required cues to slow gait speed for safety. Pt is at high risk for falls and would benefit from rehab placement at d/ in order to maximize safety and IND at d/c.     Rehab Prognosis: Good; patient would benefit from acute skilled OT services to address these deficits and reach maximum level of function.       Plan:     Patient to be seen 5 x/week to address the above listed problems via self-care/home management, therapeutic exercises, therapeutic activities  Plan of Care Expires: 11/25/22  Plan of Care Reviewed with: patient    Subjective     Chief Complaint: "The room is spinning"   Patient/Family Comments/goals: To go home     Occupational Profile:  Living Environment: Pt lives c his spouse in a [facility]. Has a tub c a shower chair and grab bar.   Previous level of function: IND c ADLs and mobility using a RW; Pt reported multiple falls at home.   Equipment Used at Home:  walker, rolling, shower chair  Assistance upon Discharge: Pt reported his daughter lives next door and is able to check on him.     Pain/Comfort:  Pain Rating 1: 0/10    Patients cultural, spiritual, Hinduism conflicts given " "the current situation: no    Objective:     Communicated with: RN prior to session.  Patient found supine with bed alarm upon OT entry to room.    General Precautions: Standard, fall   Orthopedic Precautions:N/A   Braces: N/A  Respiratory Status: Nasal cannula, flow 1 L/min    Occupational Performance:    Bed Mobility:    Patient completed Supine to Sit with minimum assistance  Patient completed Sit to Supine with minimum assistance    Functional Mobility/Transfers:  Patient completed Sit <> Stand Transfer with minimum assistance  with  rolling walker   Patient completed Toilet Transfer Step Transfer technique with minimum assistance with  rolling walker  Functional Mobility: Pt ambulated to bathroom using RW c Min A; presents with shuffling gait pattern and posterior lean. Pt ran into door frame on R side x2. Pt reported his R side is his "bad side".     Activities of Daily Living:  Grooming: minimum assistance for balance while standing at sink c RW   Lower Body Dressing: stand by assistance Doff/don socks In fig 4    Cognitive/Visual Perceptual:  Cognitive/Psychosocial Skills:     -       Safety awareness/insight to disability: impaired   -       Mood/Affect/Coping skills/emotional control: Cooperative    Physical Exam:  Upper Extremity Strength:    -       Right Upper Extremity: WFL  -       Left Upper Extremity: WFL   Vision:   Attempted to assess pt's vision during eval. Pt possibly unable to follow/understand commands for smooth pursuits but stated "I'm trying". Pt observed to track therapist L and R during session. Pt reported he felt as if the room was spinning once he sat EOB- BP assess 153/75 HR: 80. Pt denied double vision but reported is seemed as if items in room were moving.     Treatment & Education:  Pt educated on OT POC and fall precautions- pt verbalized understanding.     Patient left HOB elevated with all lines intact, call button in reach, and bed alarm on    GOALS:   Multidisciplinary " Problems       Occupational Therapy Goals          Problem: Occupational Therapy    Goal Priority Disciplines Outcome Interventions   Occupational Therapy Goal     OT, PT/OT Ongoing, Progressing    Description: Goals to be met by: 11/25     Patient will increase functional independence with ADLs by performing:    LE Dressing with Modified [place].  Grooming while standing at sink with Stand-by Assistance.  Toileting from toilet with Stand-by Assistance for hygiene and clothing management.   Toilet transfer to toilet with Stand-by Assistance.                         History:     Past Medical History:   Diagnosis Date    COPD (chronic obstructive pulmonary disease)     Hepatitis     Hypercholesterolemia     Hypertension     Malnutrition related to chronic disease     Seizure     Subacromial impingement of right shoulder     Ventricular tachycardia          Past Surgical History:   Procedure Laterality Date    APPENDECTOMY      CARDIAC PACEMAKER PLACEMENT         Time Tracking:     OT Date of Treatment: 11/11/22  OT Start Time: 1034  OT Stop Time: 1051  OT Total Time (min): 17 min    Billable Minutes:Evaluation Moderate complexity     11/11/2022   Dr. Padilla

## 2022-11-12 NOTE — PT/OT/SLP PROGRESS
Physical Therapy Treatment    Patient Name:  Tim Meneses   MRN:  [id number]    Recommendations:     Discharge Recommendations:  rehabilitation facility   Discharge Equipment Recommendations: walker, rolling, other (see comments) (TBD c therapy)     Subjective     Patient sleeping upon arrival and stating he is exhausted, but agreed to participate with therapy.    Communicated with NSG prior to session to see if Pt is appropriate for therapy today. Pt greeted and agreed to session.    Objective:     General Precautions: Standard, fall   Orthopedic Precautions:N/A   Braces: N/A  Respiratory Status: Nasal cannula, flow 1.5 L/min     Functional Mobility:    Bed Mobility: Min Assist  Sit to Stand: Min Assist  Transfers:   Gait: 30ft in room with Beth and mod A at times due to heavy posterior lean and unsteady gait.   Equipment Used: Gait belt, Rolling walker    Assessment:     Tim Meneses is a 77 y.o. male admitted with a medical diagnosis of fall c4 fx.     Treatment Encounter Note:  Patient actively participated with treatment today with no adverse effects. Pt ambulated 30ft around room with min-ModA at times due to posterior lean and unsteady gait. Pt with significant balance deficits noted during tx session. Pt with wide MINE, shuffle gait with verbal and tactile cue to lean fwd. Cues to take larger steps as well as upright posture and fwd gaze. Pt with decreased safety awareness during ambulation. V/c to side stepping to HOB and reaching back to feel bed prior to sitting. Pt performed mini squats, heel raises and fwd steps with RW and Beth for COG. Pt stating im exhausted request to return back to bed. Patient left in semi supine with all needs in reach.   Patients vitals:  /70  65bpm  Spo2 97% on 1.5L    Rehab Prognosis: Good; patient would benefit from acute skilled PT services to address these deficits and reach maximum level of function.    Recent Surgery: Procedure(s) (LRB):  EGD, WITH CLOSED BIOPSY  (N/A)  ESOPHAGOSCOPY, USING BOUGIE, WITH DILATION (N/A) 1 Day Post-Op  GOALS:   Multidisciplinary Problems       Physical Therapy Goals          Problem: Physical Therapy    Goal Priority Disciplines Outcome Goal Variances Interventions   Physical Therapy Goal     PT, PT/OT Ongoing, Progressing     Description: Goals to be met by: 2022     Patient will increase functional independence with mobility by performin. Supine to sit with [place]  2. Sit to supine with [place]  3. Sit to stand transfer with Modified [place]  4. Gait  x 200 feet with Stand-by Assistance using Rolling Walker.   5. Ascend/descend 4 stair with right Handrails Stand-by Assistance using RW vs LRAD.                          Plan:     During this hospitalization, patient to be seen daily to address the identified rehab impairments via gait training, therapeutic activities, therapeutic exercises and progress toward the following goals:    Plan of Care Expires:  22    Billable Minutes     Billable Minutes: Gait Training 15 and Therapeutic Activity 14    Treatment Type: Treatment  PT/PTA: PTA     PTA Visit Number: 3     2022

## 2022-11-12 NOTE — PROGRESS NOTES
Inpatient Nutrition Assessment    Admit Date: 11/7/2022   Total duration of encounter: 5 days     Nutrition Recommendation/Prescription     Continue soft/bite sized diet.   Add oral nutrition supplement Boost Original to provide 240 kcal and 10g protein per serving.     Communication of Recommendations: reviewed with patient/caregiver    Nutrition Assessment     Malnutrition Assessment/Nutrition-Focused Physical Exam    Malnutrition in the context of acute illness or injury  Degree of Malnutrition: non-severe (moderate) malnutrition  Energy Intake: unable to obtain  Interpretation of Weight Loss: unable to obtain  Body Fat:mild depletion  Area of Body Fat Loss: orbital region  and upper arm region - triceps / biceps  Muscle Mass Loss: mild depletion  Area of Muscle Mass Loss: temple region - temporalis muscle, clavicle bone region - pectoralis major, deltoid, trapezius muscles, and clavicle and acromion bone region - deltoid muscle  Fluid Accumulation: unable to obtain  Edema: unable to obtain   Reduced  Strength: unable to obtain  A minimum of two characteristics is recommended for diagnosis of either severe or non-severe malnutrition.    Chart Review    Reason Seen: continuous nutrition monitoring, MST score 2    Diagnosis:  1. Dysphagia with possible aspiration.    2. History of multiple falls with C4 fracture.  3. Near syncope.  4. Chronic obstructive pulmonary disease.  5. Hypertension.  6. Dyslipidemia.  7. 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  8. Protein calorie malnutrition.  9. Deconditioning.  10. BPH    Relevant Medical History: cardiac arrhythmias, GIUSEPPE, stroke, seizure disorder, anemia    Nutrition-Related Medications: Lasix  Calorie Containing IV Medications: no significant kcals from medications at this time    Nutrition-Related Labs:  11/10/22: Bun 11.3, Crea 1.32, GFR 56, Gluc 68    Diet/PN Order: Diet Soft & Bite Sized  Oral Supplement Order: none  Tube Feeding Order: none  Appetite/Oral  "Intake: good/% of meals  Factors Affecting Nutritional Intake: none identified  Food/Mu-ism/Cultural Preferences: none reported  Food Allergies: none reported    Skin Integrity: abrasion, bruised (ecchymotic)  Wound(s):      Altered Skin Integrity left anterior, right anterior, medial coccyx    Comments    11/12/22: EGD biopsy and esophageal dilation completed yesterday. Pt sleeping during rounds, medical staff provided pt oral intake hx stating pt consumed 100% of lunch tray.     Anthropometrics    Height: 5' 5" (165.1 cm)    Last Weight: 68 kg (150 lb) (11/10/22 1737) Weight Method: Bed Scale  BMI (Calculated): 25  BMI Classification: overweight (BMI 25-29.9)        Ideal Body Weight (IBW), Male: 136 lb     % Ideal Body Weight, Male (lb): 110.29 %                          Usual Weight Provided By: unable to obtain usual weight at this time.     Wt Readings from Last 5 Encounters:   11/10/22 68 kg (150 lb)   11/02/22 62.6 kg (138 lb)   10/25/22 64.4 kg (142 lb)   07/28/22 64.4 kg (142 lb)     Weight Change(s) Since Admission:  Admit Weight: 68 kg (150 lb) (11/07/22 1004)  .    Estimated Needs    Weight Used For Calorie Calculations: 68 kg (149 lb 14.6 oz)  Energy Calorie Requirements (kcal): 1600 kcal/day (mifflin st jeor x 1.2 SF)  Energy Need Method: Young-St Jeor  Weight Used For Protein Calculations: 68 kg (149 lb 14.6 oz)  Protein Requirements: 68 g/day (1g/kg/d)  Fluid Requirements (mL): 1600 mL/day  Temp: 97.5 °F (36.4 °C)       Enteral Nutrition    Patient not receiving enteral nutrition at this time.    Parenteral Nutrition    Patient not receiving parenteral nutrition support at this time.    Evaluation of Received Nutrient Intake    Calories: meeting estimated needs  Protein: meeting estimated needs    Patient Education    Not applicable.    Nutrition Diagnosis     PES: Swallowing difficulty related to possible dysphagia as evidenced by soft/bite sized diet. (new)    Interventions/Goals "     Intervention(s): collaboration with other providers  Goal: Maintain weight throughout hospitalization. (new)    Monitoring & Evaluation     Dietitian will monitor food and beverage intake and weight change.  Nutrition Risk/Follow-Up: moderate (follow-up in 3-5 days)   Please consult if re-assessment needed sooner.

## 2022-11-12 NOTE — PROGRESS NOTES
SLP followed up with RN regarding patient's diet tolerance. No issues or complaints at this time. ST to sign off.

## 2022-11-13 NOTE — CONSULTS
Inpatient consult to Cardiology  [facility]  Consult ordered by: Jama Sarmiento MD  Reason for consult: chest pain at pacemaker site      [facility]  Cardiology  Consult Note    Patient Name: Tim Meneses  MRN: [id number]  Admission Date: 11/7/2022  Hospital Length of Stay: 6 days  Code Status: No Order   Attending Provider: Jama Sarmiento MD   Consulting Provider: ALFREDA Townsend  Primary Care Physician: Primary Doctor No  Principal Problem:<principal problem not specified>    Patient information was obtained from patient, past medical records, and ER records.     Subjective:     Chief Complaint: Consulted for chest pain at pacemaker site     HPI:   This is a 77-year-old male, who is known to Dr. Maria and Dr. Wheeler, with a history of anomic aphasia, frequent falls, muscle weakness, CAD, HLD, HTN, sick sinus syndrome/ppm, NSVT, bilateral CIS, seizures, CKD.  He presents to the ER on 11/07/2022 following recurrent falls.  His daughter stated that the patient had fallen at least 7 times the last 2-3 days.  Patient complained of double vision and stated that his body is pulling him towards the right and he always falls towards the right.  Workup revealed C4 vertebral body fracture.  Neurology evaluated the patient and suspected falls are due to possible Parkinson's disease.  During his stay patient complained of pain over the pacemaker site.  CIS has been consulted for this reason.      PMH: anomic aphasia, frequent falls, muscle weakness, CAD, HLD, HTN, sick sinus syndrome/ppm, NSVT, bilateral CIS, seizures, CKD  PSH:  Ppm placement, LHC, tonsillectomy, appendectomy  Social History:  Current tobacco use, denies EtOH and illicit drug use  Family History:  No significant family history noted    Previous Cardiac Diagnostics:   Carotid US 2.22.21  The study quality is average.   1-39% stenosis in the proximal right internal carotid artery  based on Bluth Criteria.   1-39% stenosis in the proximal left internal carotid artery based on Bluth Criteria.   Antegrade right vertebral artery flow.   Antegrade left vertebral artery flow.     Echo 2.22.21  The study quality is average.   The left ventricle is normal in size. Global left ventricular systolic function is normal. The left ventricular ejection fraction is 55%.   A linear echo density is noted in the right ventricle, suggestive of a pacemaker lead.  Mild calcification of the aortic valve is noted with adequate cuspal excursion.   Mild (1+) mitral and tricuspid regurgitation.   The pulmonary artery systolic pressure is 27 mmHg. The pulmonary artery appears to be normal.    Cardiac calcium score 9.11.19  Total calcium score 1685    [facility] 12.11.17  Normal coronaries    Review of patient's allergies indicates:  No Known Allergies    No current facility-administered medications on file prior to encounter.     Current Outpatient Medications on File Prior to Encounter   Medication Sig    aspirin (ECOTRIN) 81 MG EC tablet Take 81 mg by mouth once daily.    atorvastatin (LIPITOR) 40 MG tablet Take 1 tablet by mouth once daily.    carvediloL (COREG) 25 MG tablet Take 1 tablet by mouth 2 (two) times daily.    cetirizine (ZYRTEC) 10 MG tablet Take 10 mg by mouth once daily.    furosemide (LASIX) 40 MG tablet Take 1 tablet by mouth once daily.    lacosamide (VIMPAT) 100 mg Tab Take 1 tablet (100 mg total) by mouth every 12 (twelve) hours.    lamoTRIgine (LAMICTAL) 200 MG tablet 1 po q am; 1.5 po qhs    NIFEdipine (PROCARDIA-XL) 60 MG (OSM) 24 hr tablet Take 60 mg by mouth once daily.    albuterol (PROVENTIL/VENTOLIN HFA) 90 mcg/actuation inhaler Inhale 2 puffs into the lungs every 4 (four) hours as needed.    cloNIDine (CATAPRES) 0.1 MG tablet Take 1 tablet by mouth every 8 (eight) hours as needed. Take 1 tablet orally every 8hrs as needed for SBP greater than 175 or DBP greater than 100    EScitalopram oxalate  (LEXAPRO) 5 MG Tab Take 1 tablet by mouth once daily.    fluticasone propionate (FLONASE) 50 mcg/actuation nasal spray 1 spray by Each Nostril route once daily.       Review of Systems:  Review of Systems   Constitutional: Negative.    HENT: Negative.     Eyes: Negative.    Respiratory: Negative.     Cardiovascular: Negative.    Gastrointestinal: Negative.    Endocrine: Negative.    Genitourinary: Negative.    Musculoskeletal:         Frequent falls   Skin: Negative.    Allergic/Immunologic: Negative.    Neurological:  Positive for dizziness.   Hematological: Negative.    Psychiatric/Behavioral: Negative.       Objective:     Vital Signs (Most Recent):  Temp: 100.2 °F (37.9 °C) (11/13/22 0311)  Pulse: 71 (11/13/22 0311)  Resp: 18 (11/13/22 0311)  BP: 122/67 (11/13/22 0311)  SpO2: (!) 90 % (11/13/22 0311)   Vital Signs (24h Range):  Temp:  [97.5 °F (36.4 °C)-103.1 °F (39.5 °C)] 100.2 °F (37.9 °C)  Pulse:  [65-80] 71  Resp:  [18-20] 18  SpO2:  [90 %-97 %] 90 %  BP: (122-159)/(65-73) 122/67     Weight: 68 kg (150 lb)  Body mass index is 24.96 kg/m².    SpO2: (!) 90 %  O2 Device (Oxygen Therapy): nasal cannula      Intake/Output Summary (Last 24 hours) at 11/13/2022 0657  Last data filed at 11/12/2022 1739  Gross per 24 hour   Intake 840 ml   Output 690 ml   Net 150 ml       Lines/Drains/Airways       None                     Significant Labs: CMP   Recent Labs   Lab 11/13/22  0457      K 3.5   CO2 30   BUN 23.9   CREATININE 1.93*   CALCIUM 8.6*   ALBUMIN 2.4*   BILITOT 0.8   ALKPHOS 88   AST 30   ALT 6   , CBC   Recent Labs   Lab 11/13/22  0457   WBC 10.3   HGB 11.2*   HCT 33.4*      , and Troponin No results for input(s): TROPONINI in the last 48 hours.      Significant Imaging:   Imaging Results              MRI Cervical Spine Without Contrast (Final result)  Result time 11/09/22 16:56:19      Final result by Kirk Tavarez MD (11/09/22 16:56:19)                   Impression:      No edema seen  in the suspected area of C4 fracture suggesting that the fracture is old.    Anterior bridging osteophytes at C4-C5 and C5-C6 with old fracture of the osteophyte along the anterior inferior aspect of C4    Some degenerative changes in the lower cervical spine      Electronically signed by: Kirk Tavarez  Date:    11/09/2022  Time:    16:56               Narrative:    EXAMINATION:  MRI CERVICAL SPINE WITHOUT CONTRAST    CLINICAL HISTORY:  Spine fracture, cervical, traumatic;.    TECHNIQUE:  Multiplanar, multisequence MR images of the cervical spine were acquired without the administration of contrast.    COMPARISON:  CT scan cervical spine dated 11/07/2022    FINDINGS:  The vertebral body heights are well maintained.  There is an anterior bridging osteophyte seen the level of C4-C5 and C5-C6. No significant edema seen at the level of C4 in the area of suspected fracture.  Fracture is most likely old.  No evidence of acute fracture or dislocation is seen.  Cord signal appears normal.  Examination is limited due to significant motion artifact.    There is a broad-base disc bulge seen at C4-C5 and C5-C6 which seemed to cause some mild spinal stenosis at C4-C5.  Canal at C4-C5 measures 9 mm.  Canal at the level of C5-C6 is not significantly narrowed and measures 12 mm.                                       MRI Brain Without Contrast (Final result)  Result time 11/09/22 16:28:11      Final result by Kirk Tavarez MD (11/09/22 16:28:11)                   Impression:      Chronic age related changes    Otherwise unremarkable      Electronically signed by: Kirk Tavarez  Date:    11/09/2022  Time:    16:28               Narrative:    EXAMINATION:  MRI BRAIN WITHOUT CONTRAST    CLINICAL HISTORY:  Neuro deficit, acute, stroke suspected;    TECHNIQUE:  Multiplanar multisequence MR imaging of the brain was performed without contrast.    COMPARISON:  11/07/2022    FINDINGS:  No intracranial mass or lesion is  seen.  No hemorrhage is seen.  No acute infarct is seen.  No diffusion abnormality seen.  There is diffuse cerebral atrophy seen along with some compensatory ventricular dilatation and periventricular white matter change consistent with patient's age.  Posterior fossa appears normal.  Calvarium is intact.  Paranasal sinuses appear grossly unremarkable.                                       CT Head Without Contrast (Final result)  Result time 11/07/22 10:57:24      Final result by Marcos Pina MD (11/07/22 10:57:24)                   Impression:      No acute intracranial abnormality identified.  Findings of chronic microvascular ischemic disease.      Electronically signed by: Marcos Pina  Date:    11/07/2022  Time:    10:57               Narrative:    EXAMINATION:  CT HEAD WITHOUT CONTRAST    CLINICAL HISTORY:  Head trauma, minor (Age >= 65y);    TECHNIQUE:  Low dose axial images were obtained through the head.  Coronal and sagittal reformations were also performed. Contrast was not administered.    Automatic exposure control was utilized to reduce the patient's radiation dose.    DLP= 1304    COMPARISON:  None.    FINDINGS:  No acute intracranial hemorrhage, edema or mass. No acute parenchymal abnormality.    Diffuse cerebral atrophy with concordant ventricular enlargement    Scattered hypodensities throughout the deep periventricular white matter.    The osseous structures are normal.    The mastoid air cells are clear.    Debris noted in the right auditory canal.    The globes and orbital contents are normal bilaterally.    Mucous retention cyst within the right maxillary sinus.                                        CT Cervical Spine Without Contrast (Final result)  Result time 11/07/22 10:46:51      Final result by Kirk Tavarez MD (11/07/22 10:46:51)                   Impression:      Fracture along the anterior inferior aspect of the C4 vertebral body at the base of the anterior bridging  osteophyte with no significant displacement seen.    Torticollis to the right most likely related to spasm    Degenerative changes seen throughout the cervical spine    This report was flagged in Epic as abnormal.      Electronically signed by: Kirk Tavarez  Date:    11/07/2022  Time:    10:46               Narrative:    EXAMINATION:  CT CERVICAL SPINE WITHOUT CONTRAST    CLINICAL HISTORY:  Neck trauma (Age >= 65y);    TECHNIQUE:  Low dose axial images, sagittal and coronal reformations were performed though the cervical spine.  Contrast was not administered. Automatic exposure control is utilized to reduce patient radiation exposure.    COMPARISON:  04/28/2018    FINDINGS:  There are osteoporotic and degenerative changes seen throughout the cervical spine.  There is a fracture along the anterior inferior aspect of the C4 vertebral body at the base of the anterior bridging osteophyte.  No significant displacement is seen.  There is another anterior bridging osteophyte seen at C5-C6 and C6-C7 with no acute fracture seen in that region.  The odontoid lateral masses appear grossly unremarkable.  There is some torticollis to the right most likely related to spasm.  Multilevel facet arthropathy is seen.  Some areas of heterotrophic bone formation seen along the posterior aspect of vertebral body at C5-C6 adjacent to the spinous process.  No spinous process fracture is seen.                                       X-Ray Chest 1 View (Final result)  Result time 11/07/22 10:31:04      Final result by Marcos Pina MD (11/07/22 10:31:04)                   Impression:      No acute cardiopulmonary process.      Electronically signed by: Marcos Pina  Date:    11/07/2022  Time:    10:31               Narrative:    EXAMINATION:  XR CHEST 1 VIEW    CLINICAL HISTORY:  Weakness    TECHNIQUE:  Single view of the chest    COMPARISON:  12/13/2017    FINDINGS:  No focal opacification, pleural effusion, or  pneumothorax.    The cardiomediastinal silhouette is within normal limits.    Left-sided cardiac device is noted.    No acute osseous abnormality.                                        EKG:        Telemetry:  paced    Physical Exam:  Physical Exam  Constitutional:       Appearance: Normal appearance.   HENT:      Head: Normocephalic.   Eyes:      Extraocular Movements: Extraocular movements intact.      Conjunctiva/sclera: Conjunctivae normal.   Cardiovascular:      Rate and Rhythm: Normal rate and regular rhythm.      Pulses: Normal pulses.      Heart sounds: Normal heart sounds.      Comments: Paced rhythm  Pulmonary:      Effort: Pulmonary effort is normal.      Breath sounds: Normal breath sounds.   Abdominal:      Palpations: Abdomen is soft.   Musculoskeletal:         General: Normal range of motion.      Cervical back: Neck supple.   Skin:     General: Skin is warm and dry.   Neurological:      Mental Status: He is alert and oriented to person, place, and time. Mental status is at baseline.   Psychiatric:         Mood and Affect: Mood normal.         Behavior: Behavior normal.       Home Medications:   No current facility-administered medications on file prior to encounter.     Current Outpatient Medications on File Prior to Encounter   Medication Sig Dispense Refill    aspirin (ECOTRIN) 81 MG EC tablet Take 81 mg by mouth once daily.      atorvastatin (LIPITOR) 40 MG tablet Take 1 tablet by mouth once daily.      carvediloL (COREG) 25 MG tablet Take 1 tablet by mouth 2 (two) times daily.      cetirizine (ZYRTEC) 10 MG tablet Take 10 mg by mouth once daily.      furosemide (LASIX) 40 MG tablet Take 1 tablet by mouth once daily.      lacosamide (VIMPAT) 100 mg Tab Take 1 tablet (100 mg total) by mouth every 12 (twelve) hours. 180 tablet 1    lamoTRIgine (LAMICTAL) 200 MG tablet 1 po q am; 1.5 po qhs 90 tablet 12    NIFEdipine (PROCARDIA-XL) 60 MG (OSM) 24 hr tablet Take 60 mg by mouth once daily.       albuterol (PROVENTIL/VENTOLIN HFA) 90 mcg/actuation inhaler Inhale 2 puffs into the lungs every 4 (four) hours as needed.      cloNIDine (CATAPRES) 0.1 MG tablet Take 1 tablet by mouth every 8 (eight) hours as needed. Take 1 tablet orally every 8hrs as needed for SBP greater than 175 or DBP greater than 100      EScitalopram oxalate (LEXAPRO) 5 MG Tab Take 1 tablet by mouth once daily.      fluticasone propionate (FLONASE) 50 mcg/actuation nasal spray 1 spray by Each Nostril route once daily.         Current Inpatient Medications:    Current Facility-Administered Medications:     acetaminophen tablet 650 mg, 650 mg, Oral, Q4H PRN, Jama Sarmiento MD, 650 mg at 11/13/22 0316    albuterol inhaler 2 puff, 2 puff, Inhalation, Q4H PRN, Jama Sarmiento MD    aspirin EC tablet 81 mg, 81 mg, Oral, Daily, Jama Sarmiento MD, 81 mg at 11/12/22 0946    atorvastatin tablet 40 mg, 40 mg, Oral, Daily, Jama Sarmiento MD, 40 mg at 11/12/22 0946    carbidopa-levodopa  mg per tablet 1 tablet, 1 tablet, Oral, TID, Lore Hernández MD, 1 tablet at 11/12/22 2024    carvediloL tablet 25 mg, 25 mg, Oral, BID, Jama Sarmiento MD, 25 mg at 11/12/22 2024    cloNIDine tablet 0.1 mg, 0.1 mg, Oral, Q8H PRN, Jama Sarmiento MD, 0.1 mg at 11/09/22 0455    EScitalopram oxalate tablet 5 mg, 5 mg, Oral, Daily, Lore Hernández MD, 5 mg at 11/12/22 0946    fluticasone propionate 50 mcg/actuation nasal spray 50 mcg, 1 spray, Each Nostril, Daily, Jama Sarmiento MD    furosemide tablet 40 mg, 40 mg, Oral, Daily, Jama Sarmiento MD, 40 mg at 11/12/22 0946    HYDROcodone-acetaminophen  mg per tablet 1 tablet, 1 tablet, Oral, Q4H PRN, Jama Sarmiento MD, 1 tablet at 11/12/22 1821    lacosamide tablet 100 mg, 100 mg, Oral, Q12H, Jama Sarmiento MD, 100 mg at 11/12/22 2024    lamoTRIgine tablet 200 mg, 200 mg, Oral, BID, Jama Sarmiento MD, 200 mg at 11/12/22 2024    NIFEdipine 24 hr tablet 60 mg, 60 mg, Oral, Daily, Jama Sarmiento,  MD, 60 mg at 11/12/22 0945    rivaroxaban tablet 10 mg, 10 mg, Oral, Daily with dinner, Jama Sarmiento MD, 10 mg at 11/12/22 1708    valsartan tablet 80 mg, 80 mg, Oral, BID, Jama Sarmiento MD, 80 mg at 11/12/22 2024    zinc oxide-cod liver oil 40 % paste, , Topical (Top), BID, Moreno Ceja MD, Given at 11/12/22 2100           Assessment:     IMPRESSION:  Atypical chest pain, pain over pacemaker site likely secondary to falls  Frequent falls  C4 fracture  COPD   HTN  HLD   CKD   Seizures  PCM  BPH   Anxiety   Sick sinus syndrome/ppm (SJM)  Bilateral carotid artery stenosis       PLAN:     PLAN:  Interrogate PPM (SJM)  Continue home medications  If device interrogation OK, then safe to DC patient to SNF vs Rehab.  Will sign off. Thank you for allowing us to participate in the care of this patient. Please call us with any questions.      Thank you for your consult.     Farooq Gamez [facility]  Cardiology  [facility]  11/13/2022 6:57 AM

## 2022-11-13 NOTE — PLAN OF CARE
Problem: Adult Inpatient Plan of Care  Goal: Plan of Care Review  Outcome: Ongoing, Not Progressing  Goal: Patient-Specific Goal (Individualized)  Outcome: Ongoing, Not Progressing  Goal: Absence of Hospital-Acquired Illness or Injury  Outcome: Ongoing, Not Progressing  Goal: Optimal Comfort and Wellbeing  Outcome: Ongoing, Not Progressing  Goal: Readiness for Transition of Care  Outcome: Ongoing, Not Progressing     Problem: Impaired Wound Healing  Goal: Optimal Wound Healing  Outcome: Ongoing, Not Progressing     Problem: Skin Injury Risk Increased  Goal: Skin Health and Integrity  Outcome: Ongoing, Not Progressing

## 2022-11-13 NOTE — PROGRESS NOTES
[facility]                       [address] [place] [address]    PATIENT NAME:       JESSY BRONSON  YOB: 1945  CSN:                [id number]   MRN:                [id number]  ADMIT DATE:         11/07/2022 10:05:00  PHYSICIAN:          Jama Sarmiento MD                            PROGRESS NOTE    DATE:      A 77-year-old male.  This is the progress note from yesterday.  He is doing   fairly well at the present time.  Denies any chest pain, shortness of breath,   chest pain, palpitations, or other problems.  He is awaiting for placement.    PHYSICAL EXAMINATION:  VITAL SIGNS: Blood pressure 149/70, pulse is 71, temp 97.5.    GENERAL APPEARANCE:  Alert, in no acute distress.    HEART:  Regular rhythm and rate.   LUNGS:  Significant for a few rhonchi.    ABDOMEN:  Soft, nontender.   EXTREMITIES:  Multiple bruises and skin tears.   NEUROLOGIC:  Unchanged.    LABS:  There are no new labs.    IMPRESSION:    1. History of possible aspiration.    2. History of multiple falls with C4 fracture.   3. Near syncope.   4. Chronic obstructive pulmonary disease.   5. Hypertension.   6. Dyslipidemia.   7. Chronic kidney disease stage 2 to 3.    8. Mild chronic anemia.    9. Seizure disorder.  10. Protein-calorie malnutrition.   11. Deconditioning.   12. Benign prostatic hypertrophy.   13. Anxiety, depression.   14. Cardiac arrhythmias in the past.   15. History of stroke in the past.   16. Tobacco abuse.   17. Acute kidney injury.    PLAN:  The patient will continue with the current medications.  DVT prophylaxis   and dysphagia diet as well as therapy.  Awaiting for placement.        ______________________________  MD TICO Kendall/AQS  DD:  11/13/2022  Time:  04:50PM  DT:  11/13/2022  Time:  05:47PM  Job #:  [id number]      PROGRESS NOTE

## 2022-11-14 NOTE — PROGRESS NOTES
Gastroenterology Consultation Note    Reason for Consult:  oropharyngeal dysphagia, vomiting    PCP:   Primary Doctor No    Referring MD:  Jama Sarmiento MD    Hospital Day: 7     Initial Consult 11/11/22:  This is a 77 y.o. male known to Dr. SHANON Hassan with a PMH of recurrent bradycardia s/p pacemaker placement, hepatitis, CVA, cardiac arrythmias including Vtach, seizure disorder on lamotrigine and vimpat.. He presented to the ED 11/07/22 after having multiple falls with accompanied dizziness. Patient denied hematemesis, melena, hematochezia. Imaging revealed a C4 vertebral body fracture and was placed in an aspen collar. He was admitted to the hospital for care and was followed by neurosurgery. Neurosurgery determined fracture was chronic and MRI showed no cord signal changes, so they ordered a soft collar for comfort and signed off.    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  He drinks Coca-Cola and it tends to go down.  While in the hospital  on 11/10/22, patient reports that with breakfast he had a similar episode, did not have Coke available, and began vomiting nonstop. He denies odynophagia, chest pain.     SLP was consulted, and performed MBS. Patient was found to have mild oropharyngeal dysphagia negatively impacted by likely esophageal dysfunction as retrograde movement of contrast material resulting in aspiration visualized. SLP recommended GI consult for oropharyngeal dysphagia and vomiting.    Patient states his vomiting ceased last night. He does endorse a productive cough this morning but denies further vomiting. He admits to an unintentional weight loss of 120 lbs in the past 1-2 years and reduced appetite.     Patient is a pack per day smoker and has an intermittent cough. His only NSAID use is a daily baby aspirin. He is unsure of blood thinner use, per chart review he has Xarelto ordered but has not received a dose while in the hospital.    H&H  12.2/37.4    Colonoscopy 2016 Dr. 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  Mild antral gastritis but fairly significant erosive bulbar duodenitis prompted antral biopsy to rule out Helicobacter.    11/14/22:  Path pending  Patient no longer wearing collar.  Patient with fever over the weekend. Blood culture positive for Staph aureus. This is being followed and treated by Dr. Sarmiento  Spoke with nurse regarding patient. She denies reports of patient vomiting. She states patient did crush his PO medications yesterday morning, but she is giving the medications this morning whole because he is tolerating the bite sized food that has been ordered for him.   Patient is slightly hypotensive and somewhat confused. He is receiving IV vanc.    ROS:  Review of Systems   Constitutional:  Positive for malaise/fatigue.   Respiratory:  Negative for cough, sputum production and shortness of breath.    Cardiovascular:  Negative for chest pain.   Gastrointestinal:  Negative for abdominal pain, nausea and vomiting.   Neurological:  Positive for weakness.     Medical History:   Past Medical History:   Diagnosis Date    COPD (chronic obstructive pulmonary disease)     Hepatitis     Hypercholesterolemia     Hypertension     Malnutrition related to chronic disease     Seizure     Subacromial impingement of right shoulder     Ventricular tachycardia        Surgical History:   Past Surgical History:   Procedure Laterality Date    APPENDECTOMY      CARDIAC PACEMAKER PLACEMENT         Family History:   History reviewed. No pertinent family history..     Social History:   Social History     Tobacco Use    Smoking status: Every Day     Packs/day: 1.00     Types: Cigarettes     Start date: 1961    Smokeless tobacco: Never   Substance Use Topics    Alcohol use: Yes     Comment: 1-2 times per year  "      Allergies:  Review of patient's allergies indicates:  No Known Allergies    Medications Prior to Admission   Medication Sig Dispense Refill Last Dose    aspirin (ECOTRIN) 81 MG EC tablet Take 81 mg by mouth once daily.       atorvastatin (LIPITOR) 40 MG tablet Take 1 tablet by mouth once daily.       carvediloL (COREG) 25 MG tablet Take 1 tablet by mouth 2 (two) times daily.       cetirizine (ZYRTEC) 10 MG tablet Take 10 mg by mouth once daily.       furosemide (LASIX) 40 MG tablet Take 1 tablet by mouth once daily.       lacosamide (VIMPAT) 100 mg Tab Take 1 tablet (100 mg total) by mouth every 12 (twelve) hours. 180 tablet 1     lamoTRIgine (LAMICTAL) 200 MG tablet 1 po q am; 1.5 po qhs 90 tablet 12     NIFEdipine (PROCARDIA-XL) 60 MG (OSM) 24 hr tablet Take 60 mg by mouth once daily.       albuterol (PROVENTIL/VENTOLIN HFA) 90 mcg/actuation inhaler Inhale 2 puffs into the lungs every 4 (four) hours as needed.       cloNIDine (CATAPRES) 0.1 MG tablet Take 1 tablet by mouth every 8 (eight) hours as needed. Take 1 tablet orally every 8hrs as needed for SBP greater than 175 or DBP greater than 100       EScitalopram oxalate (LEXAPRO) 5 MG Tab Take 1 tablet by mouth once daily.       fluticasone propionate (FLONASE) 50 mcg/actuation nasal spray 1 spray by Each Nostril route once daily.            Objective Findings:    Vital Signs:  BP (!) 92/52   Pulse 69   Temp 97.9 °F (36.6 °C) (Oral)   Resp 17   Ht 5' 5" (1.651 m)   Wt 68 kg (150 lb)   SpO2 96%   BMI 24.96 kg/m²   Body mass index is 24.96 kg/m².    Physical Exam:  Physical Exam  Constitutional:       General: He is not in acute distress.     Appearance: He is not ill-appearing.   HENT:      Head: Normocephalic and atraumatic.      Right Ear: External ear normal.      Left Ear: External ear normal.      Nose: Nose normal.      Mouth/Throat:      Pharynx: Oropharynx is clear.   Eyes:      Conjunctiva/sclera: Conjunctivae normal.   Pulmonary:      " Effort: Pulmonary effort is normal. No respiratory distress.   Musculoskeletal:         General: Normal range of motion.   Skin:     General: Skin is warm and dry.      Coloration: Skin is not pale.   Neurological:      Mental Status: He is alert. Mental status is at baseline.      Motor: Weakness present.   Psychiatric:         Behavior: Behavior normal.       Labs:  No results found for this or any previous visit (from the past 24 hour(s)).      X-Ray Chest 1 View   Final Result      Fl Modified Barium Swallow Speech   Final Result      X-Ray Chest 1 View   Final Result      No acute chest disease is identified.         Electronically signed by: Jd Sue   Date:    11/10/2022   Time:    14:53      MRI Cervical Spine Without Contrast   Final Result      No edema seen in the suspected area of C4 fracture suggesting that the fracture is old.      Anterior bridging osteophytes at C4-C5 and C5-C6 with old fracture of the osteophyte along the anterior inferior aspect of C4      Some degenerative changes in the lower cervical spine         Electronically signed by: Kirk Tavarez   Date:    11/09/2022   Time:    16:56      MRI Brain Without Contrast   Final Result      Chronic age related changes      Otherwise unremarkable         Electronically signed by: Kirk Tavarez   Date:    11/09/2022   Time:    16:28      CT Head Without Contrast   Final Result      No acute intracranial abnormality identified.  Findings of chronic microvascular ischemic disease.         Electronically signed by: Marcos Pina   Date:    11/07/2022   Time:    10:57      CT Cervical Spine Without Contrast   Final Result   Abnormal      Fracture along the anterior inferior aspect of the C4 vertebral body at the base of the anterior bridging osteophyte with no significant displacement seen.      Torticollis to the right most likely related to spasm      Degenerative changes seen throughout the cervical spine      This report was  flagged in Epic as abnormal.         Electronically signed by: Kirk Tavarez   Date:    11/07/2022   Time:    10:46      X-Ray Chest 1 View   Final Result      No acute cardiopulmonary process.         Electronically signed by: Marcos Whitelen   Date:    11/07/2022   Time:    10:31          Assessment/Plan:  This is a 77 y.o. male known to Dr. SHANON Hassan with a PMH of recurrent bradycardia s/p pacemaker placement, hepatitis, CVA, cardiac arrythmias including Vtach, seizure disorder on lamotrigine and vimpat. He presented to the ED 11/07/22 after having multiple falls with accompanied dizziness. GI consulted for oropharyngeal dysphagia and vomiting.    EGD: dilation of occult stricture at squamocolumnar junction, mild gastritis, erosive bulbar duodenitis, path pending    Oropharyngeal dysphagia with aspiration  Unintentional weight loss of 120 lbs in 1-2 years  - patient has had episodic solid dysphagia for some time, but began having nonstop vomiting with breakfast yesterday - SLP consulted  - MBS: mild oropharyngeal dysphagia negatively impacted by likely esophageal dysfunction as retrograde movement of contrast material resulting in aspiration visualized  - patient stopped vomiting last night, has had no further vomiting episodes  - daily PPI  - limit NSAIDs  - dysphagia diet  - path pending  - consider repeat EGD in a couple of months to assure healing of erosive bulbar duodenitis - biopsy if it has not healed  History of cardiac arrythmias  S/p pacemaker placement for bradycardia - on Xarelto  Multiple falls with fracture of C4 vertebral body, chronic  History of tobacco use    GI will sign off. Please call with any questions.    Lashon Dahl NP/Katrina Bernardo PA-C acting as scribe for SHANON Hassan MD  Gastroenterology  [facility]

## 2022-11-14 NOTE — PROGRESS NOTES
PATIENT NAME:       JESSY BRONSON  YOB: 1945  CSN:                [id number]   MRN:                [id number]  ADMIT DATE:         11/07/2022 10:05:00  PHYSICIAN:          Jama Sarmiento MD                            PROGRESS NOTE    DATE:      SUBJECTIVE:  Today, 77-year-old male who had a temp last night of 103.  Workup   was unrevealing.  He had a normal white cell count and x-ray was unremarkable.    No chest pain or palpitations.  No headaches.  He has not had any more fever or   chills.    REVIEW OF SYSTEMS:  X12 as above.    PHYSICAL EXAMINATION:  VITAL SIGNS:  Blood pressure is 103/56, pulse is 71, and temp 99.7.  Earlier   temp was 100.4.  GENERAL APPEARANCE:  Alert, in no acute distress.    HEART:  Regular rhythm and rate.   LUNGS:  Clear.  ABDOMEN:  Soft, nontender.   EXTREMITIES:  Multiple bruises and skin tears.   NEUROLOGIC:  Unchanged.    LABS:  Remarkable for a white cell count 10.3, H and H 11.2 and 33.4.  His   chemistry showed a BUN of 24, creatinine 1.93, normal liver enzymes.  Albumin   2.4.  His urine was unremarkable.  Blood cultures negative.    IMAGING:  Chest x-ray unremarkable.    IMPRESSION:    1. Febrile disorder, possibly viral.   2. Dysphagia with possible aspiration.    3. History of multiple falls.    4. C4 fracture.    5. Near syncope.   6. Chronic obstructive pulmonary disease.   7. Hypertension.   8. Dyslipidemia.   9. 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   10. Mild chronic anemia.   11. Seizure disorder.   12. Protein-calorie malnutrition.   13. Deconditioning.   14. Benign prostatic hypertrophy.   15. Anxiety, depression.   16. Cardiac arrhythmias in the past.   17. History of stroke in the past.   18. Alcohol abuse.    PLAN:  The patient will continue with the current medications.  Will continue   with APAP for fever.  We will add a respiratory panel since it is probably, his   issue.  So far, negative SARS, negative  flu.        ______________________________  MD TICO Kendall/GEORGETTE  DD:  11/13/2022  Time:  04:50PM  DT:  11/13/2022  Time:  05:56PM  Job #:  [id number]      PROGRESS NOTE

## 2022-11-14 NOTE — NURSING
Nurses Note -- 4 Eyes      11/14/2022   5:53 PM      Skin assessed during: Transfer      [] No Pressure Injuries Present    []Prevention Measures Documented      [x] Yes- Altered Skin Integrity Present or Discovered   [x] LDA Added if Not in Epic (Describe Wound)   [] New Altered Skin Integrity was Present on Admit and Documented in LDA   [] Wound Image Taken    Wound Care Consulted? Yes    Attending Nurse:  Riana Valverde RN     Second RN/Staff Member:  YOHANNES Cardenas RN

## 2022-11-14 NOTE — PLAN OF CARE
GI notes reviewed   [facility] would like to submit to insurer but need a note or order from Dr Sarmiento that pt can tolerate 3 hours of inpt therapy. Communication sent to Dr Sarmiento.

## 2022-11-14 NOTE — PROGRESS NOTES
SUBJECTIVE:  Today, 77-year-old male who had a temp last night of 103.  Workup   was unrevealing.  He had a normal white cell count and x-ray was unremarkable.    No chest pain or palpitations.  No headaches.  He has not had any more fever or   chills.    Today's info : seen and examined, no acute events overnight. Continues to improve   Fall reported  Bp on lowerside  Confusion  Tmax of 100.4  Mssa bacteremia - repeat cx neg  Echo per id    REVIEW OF SYSTEMS:  X12 as above.    PHYSICAL EXAMINATION:  VITAL SIGNS:    Vitals:    11/14/22 0740 11/14/22 0824 11/14/22 1146 11/14/22 1311   BP: (!) 94/50 (!) 92/52 99/60    Pulse: 71 69 76 79   Resp: 17 19 19   Temp: 97.9 °F (36.6 °C)  97.6 °F (36.4 °C)    TempSrc: Oral  Oral    SpO2: 96%  97% 96%   Weight:       Height:         GENERAL APPEARANCE:  Alert, in no acute distress.    HEART:  Regular rhythm and rate.   LUNGS:  Clear.  ABDOMEN:  Soft, nontender.   EXTREMITIES:  Multiple bruises and skin tears.   NEUROLOGIC:  Unchanged.    LABS:   Lab Results   Component Value Date    WBC 10.3 11/13/2022    HGB 11.2 (L) 11/13/2022    HCT 33.4 (L) 11/13/2022    MCV 85.4 11/13/2022     11/13/2022     Recent Labs   Lab 11/07/22  2206 11/08/22  0443 11/13/22  0457   NA  --    < > 137   K  --    < > 3.5   CO2  --    < > 30   BUN  --    < > 23.9   CREATININE  --    < > 1.93*   GLUCOSE  --    < > 110   CALCIUM  --    < > 8.6*   PHOS 3.0  --   --     < > = values in this interval not displayed.           IMAGING:  Chest x-ray unremarkable.    IMPRESSION:    1. Febrile disorder, possibly viral.   2. Dysphagia with possible aspiration.    3. History of multiple falls.    4. C4 fracture.    5. Near syncope.   6. Chronic obstructive pulmonary disease.   7. Hypertension.   8. Dyslipidemia.   9. 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   10. Mild chronic anemia.   11. Seizure disorder.   12. Protein-calorie malnutrition.   13. Deconditioning.   14. Benign prostatic hypertrophy.   15.  Anxiety, depression.   16. Cardiac arrhythmias in the past.   17. History of stroke in the past.   18. Alcohol abuse.    PLAN:    Will add 1 lit ns bolus  Wean off o2 as yuriy  Follow echo  Repeat cx follow  Follow echo  Ct head with out contrast  Labs in am  Gi and dvt ppx  Guarded prognosis

## 2022-11-14 NOTE — CONSULTS
CONSULTATION DATE: 11/14/2022          CONSULTING PHYSICIAN:     REASON FOR CONSULTATION:  Per [employer] policy for Staphylococcus bacteremia             HISTORY OF PRESENT ILLNESS:  Information for HPI gathered primarily through discussion with patient's daughter and nursing as patient is a somewhat poor historian at present.  He is a 77-year-old male with a past medical history of COPD, hypertension, seizure disorder, and pacemaker status who was brought to the emergency room on 11/07/2022 secondary to confusion and slurred speech for the past week.  She also reports patient has been having falls over the past several months with increased incidence in the last week.  He has had no known fevers, chills, or sweats were concern for infectious process prior to admit, however the daughter does also report that he has had new constipation as well as some numbness and tingling in his legs for the past month.  He was afebrile and hemodynamically stable on admit.  MRI of the brain was unremarkable.  Non contrasted MRI of the cervical spine showed no acute findings.  He did also recently had issues with significant vomiting during this hospitalization and underwent esophageal dilation with GI on 11/11.  He had been afebrile and hemodynamically stable, however had a fever of 103.1° F the evening of 11/12.  Blood cultures were obtained and are with 4 of 4 bottles positive for Staphylococcus aureus.  He was initiated on empiric vancomycin.  Fevers have resolved, however blood pressures are somewhat borderline.  Patient remains awake and oriented although somewhat inappropriate and impulsive.  We have been consulted per [employer] policy for Staphylococcus aureus bacteremia.        PAST MEDICAL HISTORY:   Past Medical History:   Diagnosis Date    COPD (chronic obstructive pulmonary disease)     Hepatitis     Hypercholesterolemia     Hypertension     Malnutrition related to chronic disease     Seizure     Subacromial impingement of right  shoulder     Ventricular tachycardia             PAST SURGICAL HISTORY:   Past Surgical History:   Procedure Laterality Date    APPENDECTOMY      CARDIAC PACEMAKER PLACEMENT              SOCIAL HISTORY:   Social History     Socioeconomic History    Marital status:    Tobacco Use    Smoking status: Every Day     Packs/day: 1.00     Types: Cigarettes     Start date: 1961    Smokeless tobacco: Never   Substance and Sexual Activity    Alcohol use: Yes     Comment: 1-2 times per year    Drug use: Never    Sexual activity: Not Currently            FAMILY HISTORY: History reviewed. No pertinent family history.         ALLERGIES: Review of patient's allergies indicates:  No Known Allergies         REVIEW OF SYSTEMS: Negative unless stated above         MEDICATIONS:   Reviewed in EMR        PHYSICAL EXAM:   Vitals:    11/14/22 0824   BP: (!) 92/52   Pulse: 69   Resp:    Temp:       GENERAL: Awake and alert; NAD; does not appear toxic  SKIN: no rash; multiple skin tears / bruising to upper arms; coccyx with small tear as well - no evidence of infection  HEENT: sclera non-icteric; PERRL; OP without erythema or exudates  NECK: supple; no LAD  CHEST: Coarse / wheezing throughout although nonlabored, equal expansion; left chest PPM site noted - no erythema, swelling, tenderness  CARDIOVASCULAR: RRR, S1S2; no murmur although difficult to auscultate given breath sounds; strong, equal peripheral pulses; no edema  ABDOMEN:  active bowel sounds; abdomen soft, nondistended, nontender to palpation  GENITOURINARY: no suprapubic tenderness; no CVA tenderness   EXTREMITIES: no cyanosis or clubbing  NEURO: AAO x3, although inappropriate        LABORATORY DATA: Reviewed         RADIOLOGICAL DATA:   Imaging Results              MRI Cervical Spine Without Contrast (Final result)  Result time 11/09/22 16:56:19      Final result by Kirk Tavarez MD (11/09/22 16:56:19)                   Impression:      No edema seen in the  suspected area of C4 fracture suggesting that the fracture is old.    Anterior bridging osteophytes at C4-C5 and C5-C6 with old fracture of the osteophyte along the anterior inferior aspect of C4    Some degenerative changes in the lower cervical spine      Electronically signed by: Kirk Tavarez  Date:    11/09/2022  Time:    16:56               Narrative:    EXAMINATION:  MRI CERVICAL SPINE WITHOUT CONTRAST    CLINICAL HISTORY:  Spine fracture, cervical, traumatic;.    TECHNIQUE:  Multiplanar, multisequence MR images of the cervical spine were acquired without the administration of contrast.    COMPARISON:  CT scan cervical spine dated 11/07/2022    FINDINGS:  The vertebral body heights are well maintained.  There is an anterior bridging osteophyte seen the level of C4-C5 and C5-C6. No significant edema seen at the level of C4 in the area of suspected fracture.  Fracture is most likely old.  No evidence of acute fracture or dislocation is seen.  Cord signal appears normal.  Examination is limited due to significant motion artifact.    There is a broad-base disc bulge seen at C4-C5 and C5-C6 which seemed to cause some mild spinal stenosis at C4-C5.  Canal at C4-C5 measures 9 mm.  Canal at the level of C5-C6 is not significantly narrowed and measures 12 mm.                                       MRI Brain Without Contrast (Final result)  Result time 11/09/22 16:28:11      Final result by Kirk Tavarez MD (11/09/22 16:28:11)                   Impression:      Chronic age related changes    Otherwise unremarkable      Electronically signed by: Kirk Tavarez  Date:    11/09/2022  Time:    16:28               Narrative:    EXAMINATION:  MRI BRAIN WITHOUT CONTRAST    CLINICAL HISTORY:  Neuro deficit, acute, stroke suspected;    TECHNIQUE:  Multiplanar multisequence MR imaging of the brain was performed without contrast.    COMPARISON:  11/07/2022    FINDINGS:  No intracranial mass or lesion is seen.  No  hemorrhage is seen.  No acute infarct is seen.  No diffusion abnormality seen.  There is diffuse cerebral atrophy seen along with some compensatory ventricular dilatation and periventricular white matter change consistent with patient's age.  Posterior fossa appears normal.  Calvarium is intact.  Paranasal sinuses appear grossly unremarkable.                                       CT Head Without Contrast (Final result)  Result time 11/07/22 10:57:24      Final result by Marcos Pina MD (11/07/22 10:57:24)                   Impression:      No acute intracranial abnormality identified.  Findings of chronic microvascular ischemic disease.      Electronically signed by: Marcos Pina  Date:    11/07/2022  Time:    10:57               Narrative:    EXAMINATION:  CT HEAD WITHOUT CONTRAST    CLINICAL HISTORY:  Head trauma, minor (Age >= 65y);    TECHNIQUE:  Low dose axial images were obtained through the head.  Coronal and sagittal reformations were also performed. Contrast was not administered.    Automatic exposure control was utilized to reduce the patient's radiation dose.    DLP= 1304    COMPARISON:  None.    FINDINGS:  No acute intracranial hemorrhage, edema or mass. No acute parenchymal abnormality.    Diffuse cerebral atrophy with concordant ventricular enlargement    Scattered hypodensities throughout the deep periventricular white matter.    The osseous structures are normal.    The mastoid air cells are clear.    Debris noted in the right auditory canal.    The globes and orbital contents are normal bilaterally.    Mucous retention cyst within the right maxillary sinus.                                        CT Cervical Spine Without Contrast (Final result)  Result time 11/07/22 10:46:51      Final result by Kirk Tavarez MD (11/07/22 10:46:51)                   Impression:      Fracture along the anterior inferior aspect of the C4 vertebral body at the base of the anterior bridging osteophyte  with no significant displacement seen.    Torticollis to the right most likely related to spasm    Degenerative changes seen throughout the cervical spine    This report was flagged in Epic as abnormal.      Electronically signed by: Kirk Tavarez  Date:    11/07/2022  Time:    10:46               Narrative:    EXAMINATION:  CT CERVICAL SPINE WITHOUT CONTRAST    CLINICAL HISTORY:  Neck trauma (Age >= 65y);    TECHNIQUE:  Low dose axial images, sagittal and coronal reformations were performed though the cervical spine.  Contrast was not administered. Automatic exposure control is utilized to reduce patient radiation exposure.    COMPARISON:  04/28/2018    FINDINGS:  There are osteoporotic and degenerative changes seen throughout the cervical spine.  There is a fracture along the anterior inferior aspect of the C4 vertebral body at the base of the anterior bridging osteophyte.  No significant displacement is seen.  There is another anterior bridging osteophyte seen at C5-C6 and C6-C7 with no acute fracture seen in that region.  The odontoid lateral masses appear grossly unremarkable.  There is some torticollis to the right most likely related to spasm.  Multilevel facet arthropathy is seen.  Some areas of heterotrophic bone formation seen along the posterior aspect of vertebral body at C5-C6 adjacent to the spinous process.  No spinous process fracture is seen.                                       X-Ray Chest 1 View (Final result)  Result time 11/07/22 10:31:04      Final result by Marcos Pina MD (11/07/22 10:31:04)                   Impression:      No acute cardiopulmonary process.      Electronically signed by: Marcos Pina  Date:    11/07/2022  Time:    10:31               Narrative:    EXAMINATION:  XR CHEST 1 VIEW    CLINICAL HISTORY:  Weakness    TECHNIQUE:  Single view of the chest    COMPARISON:  12/13/2017    FINDINGS:  No focal opacification, pleural effusion, or pneumothorax.    The  cardiomediastinal silhouette is within normal limits.    Left-sided cardiac device is noted.    No acute osseous abnormality.                                            IMPRESSION:   He is a 77-year-old male with a past medical history of COPD and pacemaker status who presented with confusion and increased falls for 1 week.  He had an GIUSEPPE o CKD on admit, otherwise was stable.  During course of hospitalization, he underwent esophageal dilation for suspected esophageal stricture.  He then became febrile several days later, and blood cultures are positive for Staphylococcus aureus.  Id has been consulted per [employer] policy for assistance.    MSSA bacteremia, source not clear  Sepsis s/t above  CKD  Old c-spine fracture  Vomiting, s/p esophageal dilation - resolved  PPM status  COPD  Constipation  Recent falls    PLAN:  Exam unremarkable for source of bacteremia.  Get non contrasted MRIs of the thoracic and lumbar spine, and repeat noncontrasted MRI of the cervical spine.  Get TTE, although will likely require SHANIQUA given ppm status.    Repeat blood cultures.    Maintain without central lines if feasible.   Spoke with micro - MSSA isolated in BCx.  Change vancomycin to oxacillin.   Discussed with patient, patient's daughter via phone (Radha, [phone]), and nurse.

## 2022-11-14 NOTE — PROGRESS NOTES
Pharmacokinetic Initial Assessment: IV Vancomycin    Assessment/Plan:    Initiate intravenous vancomycin with loading dose of 1250 mg once with subsequent doses when random concentrations are less than 20 mcg/mL  Desired empiric serum trough concentration is 15 to 20 mcg/mL  Draw vancomycin random level on 11/14 at 1300.  Pharmacy will continue to follow and monitor vancomycin.      Please contact pharmacy at extension [phone] with any questions regarding this assessment.     Thank you for the consult,   Mary Ruiz       Patient brief summary:  Tim Meneses is a 77 y.o. male initiated on antimicrobial therapy with IV Vancomycin for treatment of suspected bacteremia    Drug Allergies:   Review of patient's allergies indicates:  No Known Allergies    Actual Body Weight:   68 kg    Renal Function:   Estimated Creatinine Clearance: 28.3 mL/min (A) (based on SCr of 1.93 mg/dL (H)).,     Dialysis Method (if applicable):  N/A    CBC (last 72 hours):  Recent Labs   Lab Result Units 11/13/22  0457   WBC x10(3)/mcL 10.3   Hgb gm/dL 11.2*   Hct % 33.4*   Platelet x10(3)/mcL 254   Mono % % 7.4   Eos % % 0.1   Basophil % % 0.5       Metabolic Panel (last 72 hours):  Recent Labs   Lab Result Units 11/12/22  2356 11/13/22  0457   Sodium Level mmol/L  --  137   Potassium Level mmol/L  --  3.5   Chloride mmol/L  --  96*   Carbon Dioxide mmol/L  --  30   Glucose Level mg/dL  --  110   Glucose, UA mg/dL Negative  --    Blood Urea Nitrogen mg/dL  --  23.9   Creatinine mg/dL  --  1.93*   Albumin Level gm/dL  --  2.4*   Bilirubin Total mg/dL  --  0.8   Alkaline Phosphatase unit/L  --  88   Aspartate Aminotransferase unit/L  --  30   Alanine Aminotransferase unit/L  --  6       Drug levels (last 3 results):      Microbiologic Results:  Microbiology Results (last 7 days)       Procedure Component Value Units Date/Time    Blood Culture [[id number]]  (Abnormal) Collected: 11/13/22 0037    Order Status: Completed Specimen: Blood, Peripheral  Line Updated: 11/13/22 1745     GRAM STAIN Gram Positive Cocci, probable Staphylococcus      Seen in gram stain of broth only      2 of 2 bottles positive    Blood Culture [[id number]]  (Abnormal) Collected: 11/13/22 0037    Order Status: Completed Specimen: Blood, Peripheral Line Updated: 11/13/22 1744     GRAM STAIN Gram Positive Cocci, probable Staphylococcus      Seen in gram stain of broth only      2 of 2 bottles positive

## 2022-11-14 NOTE — PT/OT/SLP PROGRESS
Physical Therapy Treatment    Patient Name:  Tim Meneses   MRN:  [id number]    Recommendations:     Discharge Recommendations:  rehabilitation facility   Discharge Equipment Recommendations: walker, rolling, other (see comments) (TBD c therapy)     Subjective     Patient awake, alert, and cooperative.     Objective:     General Precautions: Standard, fall   Orthopedic Precautions:N/A   Braces:    Respiratory Status: Nasal cannula, flow . L/min     Communicated with nurse prior to treatment.     Functional Mobility:    Rolling:Minimal Assistance  Supine to sit:Moderate Assistance  Sit to stand transfer: Maximum Assistance  Bed to chair transfer:Maximum Assistance  Pt ambulated to bathroom ~12 ft with RW mod/max assist. Pt has a wide waddle base gait with strong posterior MINE during the entire gait process. Cues given with the attempts to center patients orientation anterior to normalize gait sequence but limited change. Dr Hernández @ bedside to assess gait and patient. Cont POC to improve overall mobility and independence. BP 99/60 SPO2 97%.      AM-PAC 6 CLICK MOBILITY        Patient left up in chair with call button in reach..    GOALS:   Multidisciplinary Problems       Physical Therapy Goals          Problem: Physical Therapy    Goal Priority Disciplines Outcome Goal Variances Interventions   Physical Therapy Goal     PT, PT/OT Ongoing, Progressing     Description: Goals to be met by: 2022     Patient will increase functional independence with mobility by performin. Supine to sit with [place]  2. Sit to supine with [place]  3. Sit to stand transfer with Modified [place]  4. Gait  x 200 feet with Stand-by Assistance using Rolling Walker.   5. Ascend/descend 4 stair with right Handrails Stand-by Assistance using RW vs LRAD.                          Assessment:     Tim Meneses is a 77 y.o. male admitted with a medical diagnosis of <principal problem not specified>.     Rehab Prognosis:  Good; patient would benefit from acute skilled PT services to address these deficits and reach maximum level of function.    Recent Surgery: Procedure(s) (LRB):  EGD, WITH CLOSED BIOPSY (N/A)  ESOPHAGOSCOPY, USING BOUGIE, WITH DILATION (N/A) 3 Days Post-Op    Plan:     During this hospitalization, patient to be seen daily to address the identified rehab impairments via gait training, therapeutic activities, therapeutic exercises and progress toward the following goals:    Plan of Care Expires:  12/08/22    Billable Minutes     Billable Minutes: Gait Training 15 and Therapeutic Activity 25    Treatment Type: Treatment  PT/PTA: PTA     PTA Visit Number: 4     11/14/2022

## 2022-11-14 NOTE — PROGRESS NOTES
"S: pt is seen today, he seems to be slightly worse, today, may have has an aspiration event on thrusday.     BP 99/60   Pulse 79   Temp 97.6 °F (36.4 °C) (Oral)   Resp 19   Ht 5' 5" (1.651 m)   Wt 68 kg (150 lb)   SpO2 96%   BMI 24.96 kg/m²     - awake and alert, but falls a sleep easily when he is not talked to.   - he seems to have limited vision of the R field, but with confrontation  exam he did not have any open and clear VF cut.   He does have saccadic mvmnts with his eyes, severe.   He does not have weakness in all limb of face droop. He has severe dysmetria on the R and L side, including both Upper and lower ext.  On gait, with walker, and total help from PT: the legs are widespread, very small steps, hesitation in initiating even the smallest mvmn, all the body is leaning backward and toward the L, when he is strait he feels that he is " too much forward" .  Tone is normal, slighlty increaed on the L, mild head tremor, not consistent, but no coogwheeling today.    A/p:   It is unclear what is the diagonosis today, but PD does not fit his gait.   MSA. PSP, CBD, or cerebellar degeneration are first on my ddx.   - D/c sinemet.   - aggressive PT.   - home safety eval as the discharge.   - possible aspiration pneumonia, ID on board.   - will discuss with [facility]ia, may need rosana scan, will also defer to o/p neurology.   - will sign off.   - please call with questions or concerns.     "

## 2022-11-15 NOTE — PROCEDURES
Arterial Catheter Insertion Procedure Note     Procedure: Insertion of Arterial Catheter     Indications: Hemodynamic Monitoring     Procedure Details     Maximum sterile technique was used including antiseptics, cap, sterile gloves, sterile gown, hand hygiene, mask and sterile maximum barrier drape.     Under sterile conditions the skin above the R radial  artery was prepped with Chloroprep and covered with a sterile drape. Local anesthesia was applied to the skin and subcutaneous tissues. Ultrasound guidance was used to identify the artery. A 20 -gauge catheter over a needle was then inserted into the artery. A guide wire was then passed easily through the needle. The needle was then withdrawn. A arterial catheter was then inserted into the vessel over the guide wire. The needle was then withdrawn and was connected to the monitor to ensure a proper waveform. A sterile dressing was applied.     Ultrasound/Sonosite was used during the procedure.      Estimated blood loss: 10 ml    Patient tolerated procedure well.    Zheng Guerra MD  11/14/2022

## 2022-11-15 NOTE — PROGRESS NOTES
PROGRESS NOTE    SUBJECTIVE: Events yesterday noted.  Now on two vasopressors.  On BiPAP as well, however no issues with oxygenation.  More lethargic today.         REVIEW OF SYSTEMS: Negative unless stated above         MEDICATIONS:   Reviewed in EMR        PHYSICAL EXAM:   Vitals:    11/15/22 1430   BP: 117/67   Pulse: 66   Resp: (!) 23   Temp:         GENERAL: Lethargic on BiPAP; NAD  SKIN: no rash; multiple skin tears / bruising to upper arms; coccyx with small tear as well - no evidence of infection  HEENT: sclera non-icteric; PERRL; BiPAP in place  NECK: supple; no LAD  CHEST: Coarse nonlabored, equal expansion; left chest PPM site noted - no erythema, swelling, tenderness  CARDIOVASCULAR: RRR, S1S2; no murmur, equal peripheral pulses; no edema  ABDOMEN:  active bowel sounds; abdomen soft, nondistended, nontender to palpation  GENITOURINARY: no suprapubic tenderness  EXTREMITIES: no cyanosis or clubbing  NEURO: Lethargic        LABORATORY DATA: Reviewed         RADIOLOGICAL DATA: Reviewed       IMPRESSION:   He is a 77-year-old male with a past medical history of COPD and pacemaker status who presented with confusion and increased falls for 1 week.  He had an GIUSEPPE o CKD on admit, otherwise was stable.  During course of hospitalization, he underwent esophageal dilation for suspected esophageal stricture.  He then became febrile several days later, and blood cultures are positive for Staphylococcus aureus.  Id has been consulted per [employer] policy for assistance.    MSSA bacteremia, source not clear  Septic shock  GIUSEPPE on CKD  Old c-spine fracture  Vomiting, s/p esophageal dilation - resolved  PPM status  COPD  Constipation  Recent falls    PLAN:  BCx remain positive.    TTE w/evidence of right heart strain / concern for PE.  Management per primary.  Will need SHANIQUA given presence of PPM.  Repeat BCx in am.  Agree with CT C/A/P - f/u results.   May still need MRIs of the spine, however will see what CTs show.  JOSUÉ  vancomycin.   Continue oxacillin.  Discussed with nursing and Dr. Garcia.

## 2022-11-15 NOTE — PT/OT/SLP PROGRESS
Orders received however pt with subsequent transfer to ICU, requiring BiPAP for respiratory support.  Discussed with nursing.  SLP to await new orders.

## 2022-11-15 NOTE — PROGRESS NOTES
Pharmacokinetic Assessment Follow Up: IV Vancomycin    Vancomycin serum concentration assessment(s):    The random level was drawn correctly and can be used to guide therapy at this time. The measurement is below the desired definitive target range of 15 to 20 mcg/mL.    Vancomycin Regimen Plan:  Initiate intravenous vancomycin with loading dose of 1250 mg once with subsequent doses when random concentrations are less than 20 mcg/mL  Desired empiric serum trough concentration is 15 to 20 mcg/mL  Draw vancomycin random level on 11/15 at 1300.       Drug levels (last 3 results):  Recent Labs   Lab Result Units 11/15/22  0029   Vanc Lvl Random ug/ml 13.9*       Pharmacy will continue to follow and monitor vancomycin.    Please contact pharmacy at extension [phone] for questions regarding this assessment.    Thank you for the consult,   Norris Watts       Patient brief summary:  Tim Meneses is a 77 y.o. male initiated on antimicrobial therapy with IV Vancomycin for treatment of bacteremia    The patient's current regimen is 1250mg pulse dose    Drug Allergies:   Review of patient's allergies indicates:  No Known Allergies    Actual Body Weight:   68kg    Renal Function:   Estimated Creatinine Clearance: 15.7 mL/min (A) (based on SCr of 3.49 mg/dL (H)).,     Dialysis Method (if applicable):  N/A    CBC (last 72 hours):  Recent Labs   Lab Result Units 11/13/22 0457 11/14/22 1942   WBC x10(3)/mcL 10.3 17.1*   Hgb gm/dL 11.2* 11.0*   Hct % 33.4* 34.8*   Platelet x10(3)/mcL 254 288   Mono % % 7.4 7.1   Eos % % 0.1 0.4   Basophil % % 0.5 0.5       Metabolic Panel (last 72 hours):  Recent Labs   Lab Result Units 11/12/22  2356 11/13/22 0457 11/14/22  1942   Sodium Level mmol/L  --  137 134*   Potassium Level mmol/L  --  3.5 4.7   Chloride mmol/L  --  96* 98   Carbon Dioxide mmol/L  --  30 24   Glucose Level mg/dL  --  110 202*   Glucose, UA mg/dL Negative  --   --    Blood Urea Nitrogen mg/dL  --  23.9 62.7*   Creatinine  mg/dL  --  1.93* 3.49*   Albumin Level gm/dL  --  2.4* 1.8*   Bilirubin Total mg/dL  --  0.8 0.9   Alkaline Phosphatase unit/L  --  88 67   Aspartate Aminotransferase unit/L  --  30 76*   Alanine Aminotransferase unit/L  --  6 9   Magnesium Level mg/dL  --   --  2.00       Vancomycin Administrations:  vancomycin given in the last 96 hours                     vancomycin 1.25 g in dextrose 5% 250 mL IVPB (ready to mix) (mg) 1,250 mg New Bag 11/13/22 2229                    Microbiologic Results:  Microbiology Results (last 7 days)       Procedure Component Value Units Date/Time    Blood Culture [[id number]]  (Abnormal) Collected: 11/14/22 1014    Order Status: Completed Specimen: Blood from Wrist, Right Updated: 11/15/22 0016     GRAM STAIN Gram Positive Cocci, probable Staphylococcus      Seen in gram stain of broth only      1 of 2 Aerobic bottles positive    Blood Culture [[id number]] Collected: 11/14/22 1014    Order Status: Resulted Specimen: Blood from Hand, Right Updated: 11/14/22 1022    Blood Culture [[id number]]  (Abnormal) Collected: 11/13/22 0037    Order Status: Completed Specimen: Blood, Peripheral Line Updated: 11/14/22 0645     CULTURE, BLOOD ([facility]) Staphylococcus aureus     Comment: Susceptibility To Follow        GRAM STAIN Gram Positive Cocci, probable Staphylococcus      Seen in gram stain of broth only      2 of 2 bottles positive    Blood Culture [[id number]]  (Abnormal) Collected: 11/13/22 0037    Order Status: Completed Specimen: Blood, Peripheral Line Updated: 11/14/22 0642     CULTURE, BLOOD ([facility]) Staphylococcus aureus     Comment: Identification To Follow        GRAM STAIN Gram Positive Cocci, probable Staphylococcus      Seen in gram stain of broth only      2 of 2 bottles positive

## 2022-11-15 NOTE — H&P
[facility] - [facility] ICU  Pulmonary Critical Care Note    Patient Name: Tim Meneses  MRN: [id number]  Admission Date: 11/7/2022  Hospital Length of Stay: 7 days  Code Status: No Order  Attending Provider: Moreno Ceja MD  Primary Care Provider: Primary Doctor No     Subjective:     HPI:  This is a 77-year-old male with history of CVA, seizure disorder on lamotrigine and Vimpat, recurrent bradycardia status post pacemaker placement and hepatitis who was upgraded to the ICU after a RRT on the floor for altered mental status and hypotension.  The patient was originally admitted on 11/07 status post multiple falls at home with right-sided drift, dizziness and weakness.  Both Neurology and Neurosurgery were consulted on admission.  MRI brain and cervical spine were done.  MRI brain showed no acute changes but the C-spine imaging showed a C4 fracture.  Neurology initially started the patient on Sinemet and a complete neuro workup was negative.  Sinemet was discontinued today.  Blood cultures were then positive 2/2 bottles for Gram-positive cocci probable staph.  Infectious Disease was consulted.  Patient was initially started on vancomycin but was switched to oxacillin after speciation return.  Additionally, gastroenterology was consulted secondary to reoccurring nausea and vomiting.  MBS was performed and patient was found to have mild oropharyngeal dysphagia.  An EGD was performed and there was occult stricture at the squamocolumnar junction with subsequent dilation.    Today, patient was noted to have altered and hypotensive an RRT was called.  Patient was given about 1.5 L of fluid on the floor without any improvement of blood pressure.  The patient was given a L of fluids and then transferred to the ICU.  On arrival, patient is on 0.5 Levophed and a non-rebreather mask.      Hospital Course/Significant events:      24 Hour Interval History:      Past Medical History:   Diagnosis Date    COPD  (chronic obstructive pulmonary disease)     Hepatitis     Hypercholesterolemia     Hypertension     Malnutrition related to chronic disease     Seizure     Subacromial impingement of right shoulder     Ventricular tachycardia        Social History     Socioeconomic History    Marital status:    Tobacco Use    Smoking status: Every Day     Packs/day: 1.00     Types: Cigarettes     Start date: 1961    Smokeless tobacco: Never   Substance and Sexual Activity    Alcohol use: Yes     Comment: 1-2 times per year    Drug use: Never    Sexual activity: Not Currently           Objective:     Current Outpatient Medications   Medication Instructions    albuterol (PROVENTIL/VENTOLIN HFA) 90 mcg/actuation inhaler 2 puffs, Inhalation, Every 4 hours PRN    aspirin (ECOTRIN) 81 mg, Oral, Daily    atorvastatin (LIPITOR) 40 MG tablet 1 tablet, Oral, Daily    carvediloL (COREG) 25 MG tablet 1 tablet, Oral, 2 times daily    cetirizine (ZYRTEC) 10 mg, Oral, Daily    cloNIDine (CATAPRES) 0.1 MG tablet 1 tablet, Oral, Every 8 hours PRN, Take 1 tablet orally every 8hrs as needed for SBP greater than 175 or DBP greater than 100    EScitalopram oxalate (LEXAPRO) 5 MG Tab 1 tablet, Oral, Daily    fluticasone propionate (FLONASE) 50 mcg/actuation nasal spray 1 spray, Each Nostril, Daily    furosemide (LASIX) 40 MG tablet 1 tablet, Oral, Daily    lacosamide (VIMPAT) 100 mg, Oral, Every 12 hours    lamoTRIgine (LAMICTAL) 200 MG tablet 1 po q am; 1.5 po qhs    NIFEdipine (PROCARDIA-XL) 60 mg, Oral, Daily       Current Inpatient Medications   albuterol-ipratropium  3 mL Nebulization Q6H    aspirin  81 mg Oral Daily    atorvastatin  40 mg Oral Daily    bisacodyL  10 mg Rectal Q12H    carvediloL  25 mg Oral BID    EScitalopram oxalate  5 mg Oral Daily    fluticasone propionate  1 spray Each Nostril Daily    furosemide  40 mg Oral Daily    lacosamide  100 mg Oral Q12H    lactated ringers  1,000 mL Intravenous Once    lamoTRIgine  200 mg Oral  BID    NIFEdipine  60 mg Oral Daily    oxacillin IVPB  2 g Intravenous Q4H    [START ON 11/15/2022] pantoprazole  40 mg Oral Before breakfast    rivaroxaban  10 mg Oral Daily with dinner    sodium chloride 0.9%  1,000 mL Intravenous Once    valsartan  80 mg Oral BID    zinc oxide-cod liver oil   Topical (Top) BID         No intake or output data in the 24 hours ending 11/14/22 1839    Review of Systems   Unable to perform ROS: Mental status change      Vital Signs (Most Recent):  Temp: 96.4 °F (35.8 °C) (11/14/22 1632)  Pulse: 73 (11/14/22 1830)  Resp: (!) 24 (11/14/22 1830)  BP: 105/71 (11/14/22 1830)  SpO2: (!) 92 % (11/14/22 1830)    Body mass index is 24.96 kg/m².  Weight: 68 kg (150 lb) Vital Signs (24h Range):  Temp:  [95.5 °F (35.3 °C)-99.5 °F (37.5 °C)] 96.4 °F (35.8 °C)  Pulse:  [60-79] 73  Resp:  [17-27] 24  SpO2:  [88 %-97 %] 92 %  BP: ()/(30-71) 105/71     Physical Exam  Vitals and nursing note reviewed.   Constitutional:       General: He is not in acute distress.     Comments: Patient is alert but is mumbling. Patient appears to be shivering   HENT:      Nose: Nose normal. No congestion or rhinorrhea.   Eyes:      Extraocular Movements: Extraocular movements intact.      Pupils: Pupils are equal, round, and reactive to light.   Cardiovascular:      Rate and Rhythm: Normal rate and regular rhythm.      Pulses: Normal pulses.      Heart sounds: No murmur heard.  Pulmonary:      Effort: Pulmonary effort is normal. No respiratory distress.      Breath sounds: Normal breath sounds.   Abdominal:      General: Abdomen is flat. There is no distension.      Palpations: Abdomen is soft.      Tenderness: There is no abdominal tenderness.   Musculoskeletal:         General: No swelling or tenderness. Normal range of motion.      Cervical back: Normal range of motion and neck supple.   Skin:     General: Skin is warm and dry.      Capillary Refill: Capillary refill takes more than 3 seconds.      Coloration:  Skin is not jaundiced.   Neurological:      Mental Status: He is alert.      Comments: Patient is alert and oriented to self. Is able to carry a conversion and has spontaneous movements but is not able to complete a neurological exam          Lines/Drains/Airways       Drain  Duration                  Urethral Catheter 11/14/22 1745 16 Fr. <1 day              Peripheral Intravenous Line  Duration                  Peripheral IV - Single Lumen 11/14/22 1744 Anterior;Left Forearm <1 day         Peripheral IV - Single Lumen 11/14/22 1744 Left Antecubital <1 day         Peripheral IV - Single Lumen 11/14/22 1745 Anterior;Proximal;Right Forearm <1 day                    Significant Labs:    Lab Results   Component Value Date    WBC 10.3 11/13/2022    HGB 11.2 (L) 11/13/2022    HCT 33.4 (L) 11/13/2022    MCV 85.4 11/13/2022     11/13/2022         BMP  Lab Results   Component Value Date     11/13/2022    K 3.5 11/13/2022    CO2 30 11/13/2022    BUN 23.9 11/13/2022    CREATININE 1.93 (H) 11/13/2022    CALCIUM 8.6 (L) 11/13/2022       ABG  No results for input(s): PH, PO2, PCO2, HCO3, BE in the last 168 hours.        Significant Imaging:  Chest ray:  Per my interpretation, trachea is midline, there are no obvious bony abnormalities, cardiac silhouette appears within normal limits, there are no obvious diaphragmatic abnormalities, there are some bilateral interstitial markings that are not consistent with consolidation, pneumothorax or pleural effusions.    Head CT:  No evidence of acute bleed but there are large ventricles related with chronic aging ch        Assessment/Plan:     Assessment  Shock, septic vs  obstructive vs cardiogenic  MSSA bacteremia  Acute Respiratory Failure requiring Non re-breather  Right ventricular systolic dysfunction with severe right ventricular enlargement (echocardiogram on 11/14/2022)  Esophageal stricture s/p esophageal dilation   Anxiety  Previous CVA  Seizure Disorder  C4  Fracture  CKD Stage II-III  Sick-Sinus syndrome s/p pacemaker placement  COPD        Plan  Completed 2.5 L since RRT. Continue with levophed and MAP goal >65. The nature of shock is mixed at this time point. Will further evaluate with new labs, ABG and further imaging.  Patient currently on Oxacillin per Infectious Disease. Will continue (day #2 of antibiotics)  Concerned for a possible pulmonary embolism given the respiratory failure, fall and right heart failure. Will order CTA PE and likely switch to full dose lovenox.  Will continue current home medications.  Spoke with patient's wife, Brandy, who confirmed that he is DNR at this time.       Greater than 30 minutes of critical care was time spent personally by me on the following activities: development of treatment plan with patient or surrogate and bedside caregivers, discussions with consultants, evaluation of patient's response to treatment, examination of patient, ordering and performing treatments and interventions, ordering and review of laboratory studies, ordering and review of radiographic studies, pulse oximetry, re-evaluation of patient's condition.  This critical care time did not overlap with that of any other provider or involve time for any procedures.     Zheng Guerra MD  Pulmonary Critical Care Medicine  [facility] - [facility] ICU

## 2022-11-15 NOTE — PT/OT/SLP PROGRESS
Physical Therapy      Patient Name:  Tim Meneses   MRN:  [id number]    Patient transferred to ICU 2/2 change in medical status. Will need new PT orders

## 2022-11-15 NOTE — PT/OT/SLP PROGRESS
Occupational Therapy      Patient Name:  Tim Meneses   MRN:  [id number]    Patient not seen today secondary to decline in medical status resulting in t/f to ICU. Pt is now requiring pressors and BIPAP. Awaiting cardiology consult d/t elevated troponin. Pt is not medically stable for therapy on this date. OT to f/u as appropriate.    11/15/2022

## 2022-11-15 NOTE — PROCEDURES
"Tim Meneses is a 77 y.o. male patient.    Temp: 96.4 °F (35.8 °C) (11/14/22 1632)  Pulse: 73 (11/14/22 1830)  Resp: (!) 24 (11/14/22 1830)  BP: 105/71 (11/14/22 1830)  SpO2: (!) 92 % (11/14/22 1830)  Weight: 68 kg (150 lb) (11/10/22 1737)  Height: 5' 5" (165.1 cm) (11/12/22 1319)    PICC  Date/Time: 11/15/2022 12:18 AM  Performed by: Stiven Haney RN  Consent Done: Yes  Time out: Immediately prior to procedure a time out was called to verify the correct patient, procedure, equipment, support staff and site/side marked as required  Indications: med administration and vascular access  Anesthesia: local infiltration  Local anesthetic: lidocaine 1% without epinephrine  Anesthetic Total (mL): 5  Preparation: skin prepped with ChloraPrep  Skin prep agent dried: skin prep agent completely dried prior to procedure  Sterile barriers: all five maximum sterile barriers used - cap, mask, sterile gown, sterile gloves, and large sterile sheet  Hand hygiene: hand hygiene performed prior to central venous catheter insertion  Location details: right basilic  Catheter type: triple lumen  Catheter size: 5 Fr  Catheter Length: 36cm    Ultrasound guidance: yes  Vessel Caliber: large and patent, compressibility normal  Vascular Doppler: not done  Needle advanced into vessel with real time Ultrasound guidance.  Guidewire confirmed in vessel.  Sterile sheath used.  no esophageal manometryNumber of attempts: 1  Post-procedure: blood return through all ports, chlorhexidine patch and sterile dressing applied    Assessment: placement verified by x-ray  Complications: none  Comments: I was called out earlier for this patient needing central access. At that time it was conveyed to me by the RN that Dr. Goldsmith would prefer a CICC vs a PICC. For one reason or another, a direct central access was not obtained by the ICU or surgery team and I was reconsulted for access.         Stiven Haney RN  11/15/2022    "

## 2022-11-15 NOTE — CONSULTS
NEPHROLOGY INITIAL CONSULT NOTE  [facility]     Patient Seen Date: 11/15/2022  Patient Seen Time: 2:14 PM     Consult requested by: Cheo Garcia MD     Reason for consult: GIUSEPPE on ?CKD Stage IV       HPI: 77 year-old male with COPD, HTN, HLD, and apparent chronic kidney disease without clear etiology, presented to the hospital with complaints of dizziness. The pt had multiple falls at home, and an additional fall here during his hospital stay. He was found to have a severely decreased BP and was transferred to the ICU for further care on 11/14/22. Scr on admission (11/7/22) was increased to 2.71, however was found to decreased to 1.32 on 11/10/22, and has since increased to 4.23 today. The patient has decreased urine output. He has developed MSSA bacteremia without clear source. Nephrology service is consulted for GIUSEPPE on ?CKD.    Pt seen and examined at bedside in ICU setting. Daughter present. She states the patient has known kidney disease, but does not follow-up with a nephrologist as an outpatient. Upon lab review of Epic, Scr was 1.99 on 11/20/21. Discussed the overall trajectory of care from a nephrology perspective to patient and daughter, including possible need for RRT if kidney function continues to decline. Currently, the patient admits to dry mouth and feel thirsty. Pt not on maintenance IVFs, however is receiving IV vasopressors for BP support.    Review of Systems:  General:  No fatigue  Skin: No rashes  HEENT: No vision changes  CVS: No CP  RS: No SOB  GIT: No abdominal pain  Extremities: No swelling  Neurological:  No focal weakness  Psych: No depression    Past Medical History:   Diagnosis Date    COPD (chronic obstructive pulmonary disease)     Hepatitis     Hypercholesterolemia     Hypertension     Malnutrition related to chronic disease     Seizure     Subacromial impingement of right shoulder     Ventricular tachycardia       Past Surgical History:   Procedure Laterality  Date    APPENDECTOMY      CARDIAC PACEMAKER PLACEMENT        Review of patient's allergies indicates:  No Known Allergies   Social History     Tobacco Use    Smoking status: Every Day     Packs/day: 1.00     Types: Cigarettes     Start date: 1961    Smokeless tobacco: Never   Substance Use Topics    Alcohol use: Yes     Comment: 1-2 times per year    Drug use: Never      History reviewed. No pertinent family history.      Current Facility-Administered Medications:     acetaminophen tablet 650 mg, 650 mg, Oral, Q4H PRN, Jama Sarmiento MD, 650 mg at 11/13/22 0316    albuterol inhaler 2 puff, 2 puff, Inhalation, Q4H PRN, Jama Sarmiento MD    aspirin EC tablet 81 mg, 81 mg, Oral, Daily, Jama Sarmiento MD, 81 mg at 11/14/22 0859    atorvastatin tablet 40 mg, 40 mg, Oral, Daily, Jama Sarmiento MD, 40 mg at 11/14/22 0859    bisacodyL suppository 10 mg, 10 mg, Rectal, Q12H, JOSE Noland, 10 mg at 11/14/22 1136    dexmedetomidine (PRECEDEX) 400mcg/100mL 0.9% NaCL infusion, 0-1.4 mcg/kg/hr, Intravenous, Continuous, Zheng Guerra MD, Stopped at 11/15/22 0100    enoxaparin injection 40 mg, 40 mg, Subcutaneous, Q12H, Cheo Garcia MD    EScitalopram oxalate tablet 5 mg, 5 mg, Oral, Daily, Lore Hernández MD, 5 mg at 11/14/22 0859    HYDROcodone-acetaminophen  mg per tablet 1 tablet, 1 tablet, Oral, Q4H PRN, Jama Sarmiento MD, 1 tablet at 11/13/22 1620    hydrocortisone sodium succinate injection 100 mg, 100 mg, Intravenous, Q8H, Cheo Garcia MD    lacosamide (VIMPAT) 100 mg in sodium chloride 0.9% 100 mL IVPB, 100 mg, Intravenous, Q12H, Magdy Vanegas DO, Stopped at 11/15/22 0523    lamoTRIgine tablet 200 mg, 200 mg, Oral, BID, Jama Sarmiento MD, 200 mg at 11/14/22 0859    mupirocin 2 % ointment, , Nasal, BID, Moreno Ceja MD    NORepinephrine 32 mg in dextrose 5 % 250 mL infusion, 0-3 mcg/kg/min, Intravenous, Continuous, Zheng Guerra MD, Last Rate: 23 mL/hr at 11/15/22 1000,  0.72 mcg/kg/min at 11/15/22 1000    oxacillin 2 g in sodium chloride 0.9 % 100 mL IVPB (MB+), 2 g, Intravenous, Q4H, JOSE Noland, Last Rate: 200 mL/hr at 11/15/22 1313, 2 g at 11/15/22 1313    [START ON 11/16/2022] pantoprazole injection 40 mg, 40 mg, Intravenous, Daily, Magdy Vanegas, DO    Flushing PICC Protocol, , , Until Discontinued **AND** sodium chloride 0.9% flush 10 mL, 10 mL, Intravenous, Q6H, 10 mL at 11/15/22 0600 **AND** sodium chloride 0.9% flush 10 mL, 10 mL, Intravenous, PRN, Zheng Guerra MD    vasopressin (PITRESSIN) 0.2 Units/mL in dextrose 5 % 100 mL infusion, 0.04 Units/min, Intravenous, Continuous, Zheng Guerra MD, Last Rate: 12 mL/hr at 11/15/22 1319, 0.04 Units/min at 11/15/22 1319    zinc oxide-cod liver oil 40 % paste, , Topical (Top), BID, Moreno Ceja MD, Given at 11/14/22 2100    Vital Signs (24 h):  Temp:  [95.5 °F (35.3 °C)-97.9 °F (36.6 °C)] 97.8 °F (36.6 °C)  Pulse:  [60-82] 68  Resp:  [15-29] 24  SpO2:  [85 %-100 %] 95 %  BP: ()/(30-96) 125/79  Arterial Line BP: (108-167)/(43-69) 136/44   I/O last 3 completed shifts:  In: 482 [I.V.:382; IV Piggyback:100]  Out: 95 [Urine:95]  I/O this shift:  In: -   Out: 10 [Urine:10]        Physical Exam:  General: NAD, ill-appearance   HEENT: + facemask O2  CVS: S1/S2 present and normal. No murmur/rubs/gallop.      RS: decreased bibasilar breath sounds  Abdominal: Soft, NT/ND  Extremities: No edema b/l LE  Skin: No rash, no lesions.  Neurological: No focal deficits.  Psych: Normal affect  Dialysis Access: None    Results:    Lab Results   Component Value Date     (L) 11/15/2022    K 4.5 11/15/2022    CO2 25 11/15/2022    BUN 68.0 (H) 11/15/2022    CREATININE 4.23 (H) 11/15/2022    CALCIUM 7.3 (L) 11/15/2022    PHOS 6.3 (H) 11/15/2022    WBC 18.9 (H) 11/15/2022    HGB 10.4 (L) 11/15/2022    HCT 32.8 (L) 11/15/2022     11/15/2022       Assessment and Plan:      GIUSEPPE on ?CKD Stage Unknown (likely 3-4).  Pt with  GIUSEPPE on likely CKD Stage 3-4 in the setting of low BP, infection, and possible ATN. Pt with known kidney disease in the past, however has not been evaluated by a nephrologist as an outpatient. Upon lab review of Epic, Scr was 1.99 on 11/20/21. Scr on admission (11/7/22) was increased to 2.71, however was found to decreased to 1.32 on 11/10/22, and has since increased to 4.23 today. UA with 1+ protein and trace ketones, otherwise with inactive sediment. No renal ultrasound completed during this hospitalization. Pt is oliguric.  - Recommend obtaining urine sodium, urine creatinine, and urine protein.  - Will obtain BHARATI, C3, C4, ANCA, and follow-up HIV and hepatitis panel.  - Suspect likely ATN from severely low BP yesterday, requiring IV vasopressor support. Continue BP support. Recommend starting NS IVF at 75 cc/hr. Monitor for signs of fluid overload.  - Discussed role of RRT with daughter at bedside. She will discuss this with family. We will monitor his response to conservative therapy before consideration of RRT. At current there are no clear indications for initiation of RRT.  - Monitor labs. Avoid potential nephrotoxins.    Thank you for your consult. Please feel free to reach me with any questions.  Plan for follow-up tomorrow.    Misha Thorne,   Interventional Nephrology  [facility]  Cell: [phone]  Office: [phone]

## 2022-11-15 NOTE — PROGRESS NOTES
[facility] - [facility] ICU  Cardiology  Progress Note    Patient Name: Tim Meneses  MRN: [id number]  Admission Date: 11/7/2022  Hospital Length of Stay: 8 days  Code Status: DNR   Attending Physician: Moreno Ceja MD   Primary Care Physician: Primary Doctor No  Expected Discharge Date:   Principal Problem:<principal problem not specified>    Subjective:   Consultation Reason: Initial- Chest Pain at [facility], Re-consult- NSTEMI    HPI:   Mr. Meneses is a 77 year old male, known to Dr. Maria/Dr. Wheeler, who presented to the hospital with altered mental status and hypotension. He was originally admitted with frequent falls. MRI Brain was performed due to stroke-like symptoms, but was negative/no acute changes. C-Spine imaging revealed C4 fracture. He was noted to have positive blood cultures. ID adjusting antibiotics. He was noted to be hypotensive (11.15.22) which required RRT and fluid administration which helped the hypotension. Troponin values noted to be elevated with peak value of 5.3 with trend down. He does have chronic kidney disease with elevated creatinine. Echocardiogram on 11.14.22 revealed intact LV Function with positive Madden's Sign. [facility] is consulted for cardiac evaluation concerning the elevated troponin.  Of note, we were initially consulted on 11.13.22 for chest pain at [facility] site evaluation.    Hospital Course:   11.15.22: Re-consult in the setting of elevated troponin. Hypotensive on Vasopressor Support. SR on Tele. BIPAP in Place.     PMH: CAD (By CACS. Normal Coronaries in 2017), CACS (1685/90th Percentile 9.11.19), CVD- BICA 1-39%, Hypertension, Hyperlipidemia, CKD, SSS/PPM, NSVT (Negative EP Study), Seizure, Chronic Tobacco Use, Frequent Falls, Seizures  PSH: Pacemaker ([facility]), Angiogram,, EP Study, Tonsillectomy, Appendectomy  Family History: No Significant Family History is Noted  Social History: Tobacco- Active Smoker, Alcohol- Negative, Substance Abuse-  Negative    Previous Cardiac Diagnostics:   Echocardiogram (11.14.22):  The left ventricle is normal in size with concentric remodeling and normal systolic function. The estimated ejection fraction is 55%.  Severe right ventricular enlargement with moderately reduced right ventricular systolic function. Madden's sign is present. Please correlate clinically for the presence of an acute pulmonary embolism.  Madden sign is present.  Please correlate clinically for the presence of an acute pulmonary embolism.    Carotid US (2.22.21):  The study quality is average.   1-39% stenosis in the proximal right internal carotid artery based on Bluth Criteria.   1-39% stenosis in the proximal left internal carotid artery based on Bluth Criteria.   Antegrade right vertebral artery flow.   Antegrade left vertebral artery flow.     EP Study (12.12.17):  Sinus Node Dysfunction. Abnormal Supra-hisian AV Radha Disease, No Inducible Sustained Arrhythmia.    Coronary Angiogram (12.11.17):  Normal Coronaries    Review of Systems   Unable to perform ROS: Acuity of condition   BIPAP in Place.     Objective:     Vital Signs (Most Recent):  Temp: 97.8 °F (36.6 °C) (11/15/22 0400)  Pulse: 72 (11/15/22 0745)  Resp: 20 (11/15/22 0745)  BP: (!) 115/92 (11/15/22 0745)  SpO2: 97 % (11/15/22 0745)   Vital Signs (24h Range):  Temp:  [95.5 °F (35.3 °C)-97.9 °F (36.6 °C)] 97.8 °F (36.6 °C)  Pulse:  [60-82] 72  Resp:  [15-27] 20  SpO2:  [85 %-100 %] 97 %  BP: ()/(30-92) 115/92  Arterial Line BP: (110-167)/(44-69) 116/49     Weight: 68 kg (150 lb)  Body mass index is 24.96 kg/m².    SpO2: 97 %  O2 Device (Oxygen Therapy): BiPAP      Intake/Output Summary (Last 24 hours) at 11/15/2022 0813  Last data filed at 11/15/2022 0600  Gross per 24 hour   Intake 482 ml   Output 95 ml   Net 387 ml       Lines/Drains/Airways       Peripherally Inserted Central Catheter Line  Duration             PICC Triple Lumen 11/15/22 0018 right basilic <1 day               Drain  Duration                  Urethral Catheter 11/14/22 1745 16 Fr. <1 day              Arterial Line  Duration             Arterial Line 11/14/22 2230 Right Radial <1 day              Peripheral Intravenous Line  Duration                  Peripheral IV - Single Lumen 11/14/22 1744 Anterior;Left Forearm <1 day         Peripheral IV - Single Lumen 11/14/22 1744 Left Antecubital <1 day         Peripheral IV - Single Lumen 11/14/22 1745 Anterior;Proximal;Right Forearm <1 day                    Significant Labs:   Recent Results (from the past 72 hour(s))   COVID/FLU A&B PCR    Collection Time: 11/12/22 11:56 PM   Result Value Ref Range    Influenza A PCR Not Detected Not Detected    Influenza B PCR Not Detected Not Detected    SARS-CoV-2 PCR Not Detected Not Detected   Urinalysis, Reflex to Urine Culture Urine, Clean Catch    Collection Time: 11/12/22 11:56 PM    Specimen: Urine   Result Value Ref Range    Color, UA Yellow Yellow, Light-Yellow, Dark Yellow, Kathy, Straw    Appearance, UA Clear Clear    Specific Gravity, UA 1.015 1.001 - 1.030    pH, UA 5.0 5.0 - 8.5    Protein, UA 1+ (A) Negative mg/dL    Glucose, UA Negative Negative, Normal mg/dL    Ketones, UA Trace (A) Negative mg/dL    Blood, UA Negative Negative unit/L    Bilirubin, UA Negative Negative mg/dL    Urobilinogen, UA 1.0 0.2, 1.0, Normal mg/dL    Nitrites, UA Negative Negative    Leukocyte Esterase, UA Negative Negative unit/L   Urinalysis, Microscopic    Collection Time: 11/12/22 11:56 PM   Result Value Ref Range    RBC, UA <5 <=5 /HPF    WBC, UA <5 <=5 /HPF    Squamous Epithelial Cells, UA <5 <=5 /HPF    Bacteria, UA None Seen None Seen, Rare, Occasional /HPF   Blood Culture    Collection Time: 11/13/22 12:37 AM    Specimen: Blood, Peripheral Line   Result Value Ref Range    CULTURE, BLOOD ([facility]) Methicillin Sensitive Staphylococcus aureus (A)     GRAM STAIN Gram Positive Cocci, probable Staphylococcus (AA)     GRAM STAIN Seen in gram stain  of broth only (AA)     GRAM STAIN 2 of 2 bottles positive (AA)        Susceptibility    Methicillin Sensitive Staphylococcus aureus -  (no method available)     Clindamycin <=0.25 Sensitive      Erythromycin >=8 Resistant      Gentamicin <=0.5 Sensitive      Oxacillin 0.5 Sensitive      Penicillin >=0.5 Resistant      Trimethoprim/Sulfamethoxazole <=10 Sensitive      Tetracycline <=1 Sensitive      Vancomycin <=0.5 Sensitive    Blood Culture    Collection Time: 11/13/22 12:37 AM    Specimen: Blood, Peripheral Line   Result Value Ref Range    CULTURE, BLOOD ([facility]) Methicillin Sensitive Staphylococcus aureus (A)     GRAM STAIN Gram Positive Cocci, probable Staphylococcus (AA)     GRAM STAIN Seen in gram stain of broth only (AA)     GRAM STAIN 2 of 2 bottles positive (AA)        Susceptibility    Methicillin Sensitive Staphylococcus aureus -  (no method available)     Clindamycin <=0.25 Sensitive      Erythromycin >=8 Resistant      Gentamicin <=0.5 Sensitive      Oxacillin 0.5 Sensitive      Penicillin >=0.5 Resistant      Trimethoprim/Sulfamethoxazole <=10 Sensitive      Tetracycline <=1 Sensitive      Vancomycin <=0.5 Sensitive    Comprehensive Metabolic Panel    Collection Time: 11/13/22  4:57 AM   Result Value Ref Range    Sodium Level 137 136 - 145 mmol/L    Potassium Level 3.5 3.5 - 5.1 mmol/L    Chloride 96 (L) 98 - 107 mmol/L    Carbon Dioxide 30 23 - 31 mmol/L    Glucose Level 110 82 - 115 mg/dL    Blood Urea Nitrogen 23.9 8.4 - 25.7 mg/dL    Creatinine 1.93 (H) 0.73 - 1.18 mg/dL    Calcium Level Total 8.6 (L) 8.8 - 10.0 mg/dL    Protein Total 5.8 5.8 - 7.6 gm/dL    Albumin Level 2.4 (L) 3.4 - 4.8 gm/dL    Globulin 3.4 2.4 - 3.5 gm/dL    Albumin/Globulin Ratio 0.7 (L) 1.1 - 2.0 ratio    Bilirubin Total 0.8 <=1.5 mg/dL    Alkaline Phosphatase 88 40 - 150 unit/L    Alanine Aminotransferase 6 0 - 55 unit/L    Aspartate Aminotransferase 30 5 - 34 unit/L    eGFR 35 mls/min/1.73/m2   CBC with Differential     Collection Time: 11/13/22  4:57 AM   Result Value Ref Range    WBC 10.3 4.5 - 11.5 x10(3)/mcL    RBC 3.91 (L) 4.70 - 6.10 x10(6)/mcL    Hgb 11.2 (L) 14.0 - 18.0 gm/dL    Hct 33.4 (L) 42.0 - 52.0 %    MCV 85.4 80.0 - 94.0 fL    MCH 28.6 27.0 - 31.0 pg    MCHC 33.5 33.0 - 36.0 mg/dL    RDW 13.0 11.5 - 17.0 %    Platelet 254 130 - 400 x10(3)/mcL    MPV 8.5 7.4 - 10.4 fL    Neut % 85.4 %    Lymph % 5.7 %    Mono % 7.4 %    Eos % 0.1 %    Basophil % 0.5 %    Lymph # 0.59 (L) 0.6 - 4.6 x10(3)/mcL    Neut # 8.8 2.1 - 9.2 x10(3)/mcL    Mono # 0.76 0.1 - 1.3 x10(3)/mcL    Eos # 0.01 0 - 0.9 x10(3)/mcL    Baso # 0.05 0 - 0.2 x10(3)/mcL    IG# 0.09 (H) 0 - 0.04 x10(3)/mcL    IG% 0.9 %    NRBC% 0.0 %   Respiratory Panel    Collection Time: 11/13/22  5:54 PM   Result Value Ref Range    B. parapertussis (IS 1001) not detected     B. Pertussis (ptxP) not detected     Chlamydia pneumoniae not detected     Mycoplasma pneumoniae not detected     Adenovirus not detected     Coronavirus 229E not detected     Coronavirus HKU1 not detected     Coronavirus NL63 not detected     Coronavirus OC43 not detected     Human METAPNEUMOVIRUS not detected     Human ENTEROVIRUS not detected     Influenza A not detected     Influenza A H1-2009      Influenza A H3      Influenza B not detected     Parainfluenza Virus 1 not detected     Parainfluenza Virus 2 not detected     Parainfluenza Virus 3 not detected     Parainfluenza Virus 4 not detected     Respiratory Syncytial Virus not detected    Blood Culture    Collection Time: 11/14/22 10:14 AM    Specimen: Wrist, Right; Blood   Result Value Ref Range    GRAM STAIN Gram Positive Cocci, probable Staphylococcus (AA)     GRAM STAIN Seen in gram stain of broth only (AA)     GRAM STAIN 1 of 1 Aerobic bottle positive (AA)    Blood Culture    Collection Time: 11/14/22 10:14 AM    Specimen: Hand, Right; Blood   Result Value Ref Range    GRAM STAIN Gram Positive Cocci, probable Staphylococcus (AA)     GRAM  STAIN Seen in gram stain of broth only (AA)     GRAM STAIN 1 of 1 Aerobic bottle positive (AA)    Echo    Collection Time: 11/14/22 10:43 AM   Result Value Ref Range    BSA 1.77 m2    EF 55 %    LVIDd 3.60 3.5 - 6.0 cm    IVS 1.40 0.6 - 1.1 cm    Posterior Wall 1.20 0.6 - 1.1 cm    LVIDs 1.20 2.1 - 4.0 cm    FS 67 28 - 44 %    LV mass 160.07 g    LA size 2.60 cm    Left Ventricle Relative Wall Thickness 0.67 cm    AV Velocity Ratio 0.58     AV index (prosthetic) 0.67     E/A ratio 0.83     LVOT peak hoang 0.7 m/s    LVOT peak VTI 18.70 cm    Ao peak hoang 1.2 m/s    Ao VTI 27.90 cm    AV peak gradient 6 mmHg    MV Peak E Hoang 0.50 m/s    MV Peak A Hoang 0.60 m/s    LV Mass Index 91 g/m2   POCT glucose    Collection Time: 11/14/22  3:59 PM   Result Value Ref Range    POCT Glucose 162 (H) 70 - 110 mg/dL   Comprehensive Metabolic Panel    Collection Time: 11/14/22  7:42 PM   Result Value Ref Range    Sodium Level 134 (L) 136 - 145 mmol/L    Potassium Level 4.7 3.5 - 5.1 mmol/L    Chloride 98 98 - 107 mmol/L    Carbon Dioxide 24 23 - 31 mmol/L    Glucose Level 202 (H) 82 - 115 mg/dL    Blood Urea Nitrogen 62.7 (H) 8.4 - 25.7 mg/dL    Creatinine 3.49 (H) 0.73 - 1.18 mg/dL    Calcium Level Total 7.7 (L) 8.8 - 10.0 mg/dL    Protein Total 4.9 (L) 5.8 - 7.6 gm/dL    Albumin Level 1.8 (L) 3.4 - 4.8 gm/dL    Globulin 3.1 2.4 - 3.5 gm/dL    Albumin/Globulin Ratio 0.6 (L) 1.1 - 2.0 ratio    Bilirubin Total 0.9 <=1.5 mg/dL    Alkaline Phosphatase 67 40 - 150 unit/L    Alanine Aminotransferase 9 0 - 55 unit/L    Aspartate Aminotransferase 76 (H) 5 - 34 unit/L    eGFR 17 mls/min/1.73/m2   Magnesium    Collection Time: 11/14/22  7:42 PM   Result Value Ref Range    Magnesium Level 2.00 1.60 - 2.60 mg/dL   Troponin I    Collection Time: 11/14/22  7:42 PM   Result Value Ref Range    Troponin-I 2.583 (H) 0.000 - 0.045 ng/mL   Lactic Acid, Plasma    Collection Time: 11/14/22  7:42 PM   Result Value Ref Range    Lactic Acid Level 1.9 0.5 - 2.2  mmol/L   CBC with Differential    Collection Time: 11/14/22  7:42 PM   Result Value Ref Range    WBC 17.1 (H) 4.5 - 11.5 x10(3)/mcL    RBC 3.95 (L) 4.70 - 6.10 x10(6)/mcL    Hgb 11.0 (L) 14.0 - 18.0 gm/dL    Hct 34.8 (L) 42.0 - 52.0 %    MCV 88.1 80.0 - 94.0 fL    MCH 27.8 27.0 - 31.0 pg    MCHC 31.6 (L) 33.0 - 36.0 mg/dL    RDW 13.5 11.5 - 17.0 %    Platelet 288 130 - 400 x10(3)/mcL    MPV 9.4 7.4 - 10.4 fL    Neut % 84.7 %    Lymph % 6.5 %    Mono % 7.1 %    Eos % 0.4 %    Basophil % 0.5 %    Lymph # 1.11 0.6 - 4.6 x10(3)/mcL    Neut # 14.5 (H) 2.1 - 9.2 x10(3)/mcL    Mono # 1.21 0.1 - 1.3 x10(3)/mcL    Eos # 0.06 0 - 0.9 x10(3)/mcL    Baso # 0.09 0 - 0.2 x10(3)/mcL    IG# 0.14 (H) 0 - 0.04 x10(3)/mcL    IG% 0.8 %    NRBC% 0.0 %   POCT ARTERIAL BLOOD GAS    Collection Time: 11/14/22  8:42 PM   Result Value Ref Range    POC PH 7.32 (A) 7.35 - 7.45    POC PCO2 54 (AA) 19 - 50 mmHg    POC PO2 82 80 - 100 mmHg    POC HEMOGLOBIN 10.8 (A) 12.0 - 16.0 g/dL    POC SATURATED O2 95.0 %    POC O2Hb 94.7 94.0 - 97.0 %    POC COHb 1.8 %    POC MetHb 1.3 0.40 - 1.5 %    POC Potassium 4.3 3.5 - 5.0 mmol/l    POC Sodium 129 (A) 137 - 145 mmol/l    POC Ionized Calcium 0.97 (A) 1.1 - 1.2 mmol/l    Correct Temperature (PH) 7.32 (A) 7.35 - 7.45    Corrected Temperature (pCO2) 54 (AA) 19 - 50 mmHg    Corrected Temperature (pO2) 82 80 - 100 mmHg    POC HCO3 27.8 (A) 22.0 - 26.0 mmol/l    Base Deficit 1.0 -2.0 - 2.0 mmol/l    POC Temp 37.0 °C    Specimen source Arterial sample    Troponin I    Collection Time: 11/15/22 12:29 AM   Result Value Ref Range    Troponin-I 5.388 (H) 0.000 - 0.045 ng/mL   Vancomycin, Random    Collection Time: 11/15/22 12:29 AM   Result Value Ref Range    Vanc Lvl Random 13.9 (L) 15.0 - 20.0 ug/ml   SYPHILIS ANTIBODY (WITH REFLEX RPR)    Collection Time: 11/15/22  1:50 AM   Result Value Ref Range    Syphilis Antibody Nonreactive Nonreactive, Equivocal   Troponin I    Collection Time: 11/15/22  1:50 AM   Result  Value Ref Range    Troponin-I 5.381 (H) 0.000 - 0.045 ng/mL   Comprehensive Metabolic Panel    Collection Time: 11/15/22  2:59 AM   Result Value Ref Range    Sodium Level 132 (L) 136 - 145 mmol/L    Potassium Level 4.5 3.5 - 5.1 mmol/L    Chloride 96 (L) 98 - 107 mmol/L    Carbon Dioxide 25 23 - 31 mmol/L    Glucose Level 253 (H) 82 - 115 mg/dL    Blood Urea Nitrogen 68.0 (H) 8.4 - 25.7 mg/dL    Creatinine 4.23 (H) 0.73 - 1.18 mg/dL    Calcium Level Total 7.3 (L) 8.8 - 10.0 mg/dL    Protein Total 5.1 (L) 5.8 - 7.6 gm/dL    Albumin Level 1.9 (L) 3.4 - 4.8 gm/dL    Globulin 3.2 2.4 - 3.5 gm/dL    Albumin/Globulin Ratio 0.6 (L) 1.1 - 2.0 ratio    Bilirubin Total 0.8 <=1.5 mg/dL    Alkaline Phosphatase 67 40 - 150 unit/L    Alanine Aminotransferase 9 0 - 55 unit/L    Aspartate Aminotransferase 85 (H) 5 - 34 unit/L    eGFR 14 mls/min/1.73/m2   Magnesium    Collection Time: 11/15/22  2:59 AM   Result Value Ref Range    Magnesium Level 1.90 1.60 - 2.60 mg/dL   Phosphorus    Collection Time: 11/15/22  2:59 AM   Result Value Ref Range    Phosphorus Level 6.3 (H) 2.3 - 4.7 mg/dL   CBC with Differential    Collection Time: 11/15/22  2:59 AM   Result Value Ref Range    WBC 18.9 (H) 4.5 - 11.5 x10(3)/mcL    RBC 3.77 (L) 4.70 - 6.10 x10(6)/mcL    Hgb 10.4 (L) 14.0 - 18.0 gm/dL    Hct 32.8 (L) 42.0 - 52.0 %    MCV 87.0 80.0 - 94.0 fL    MCH 27.6 27.0 - 31.0 pg    MCHC 31.7 (L) 33.0 - 36.0 mg/dL    RDW 13.4 11.5 - 17.0 %    Platelet 312 130 - 400 x10(3)/mcL    MPV 9.3 7.4 - 10.4 fL    IG# 0.23 (H) 0 - 0.04 x10(3)/mcL    IG% 1.2 %    NRBC% 0.0 %   Manual Differential    Collection Time: 11/15/22  2:59 AM   Result Value Ref Range    Neut Man 93 %    Lymph Man 4 %    Monocyte Man 3 %    Instr WBC 18.9 x10(3)/mcL    Abs Mono 0.567 0.1 - 1.3 x10(3)/mcL    Abs Lymp 0.756 0.6 - 4.6 x10(3)/mcL    Abs Neut 17.577 (H) 2.1 - 9.2 x10(3)/mcL    RBC Morph Abnormal (A) Normal    Anisocyte 1+ (A) (none)    Poik 2+ (A) (none)    Microcyte 1+ (A)  (none)    Tigre Cells 2+ (A) (none)    Platelet Est Adequate Normal, Adequate   Troponin I    Collection Time: 11/15/22  6:55 AM   Result Value Ref Range    Troponin-I 3.921 (H) 0.000 - 0.045 ng/mL   POCT ARTERIAL BLOOD GAS    Collection Time: 11/15/22  7:28 AM   Result Value Ref Range    POC PH 7.35     POC PCO2 49 (A) mmHg    POC PO2 92 mmHg    POC SATURATED O2 97 %    POC Potassium 4.6 mmol/l    POC Sodium 128 (A) 137 - 145 mmol/l    POC Ionized Calcium 0.87 (A) 1.1 - 1.2 mmol/l    POC HCO3 27.1 mmol/l    Base Deficit 0.80 mmol/l    POC Temp 37.0 C    Specimen source Arterial sample        Telemetry:  Sinus Rhythm    Physical Exam  Vitals reviewed.   Constitutional:       Appearance: He is ill-appearing.   HENT:      Head: Normocephalic.      Mouth/Throat:      Mouth: Mucous membranes are moist.      Pharynx: Oropharynx is clear.   Cardiovascular:      Rate and Rhythm: Normal rate and regular rhythm.      Comments: Sinus Rhythm  Pulmonary:      Effort: Pulmonary effort is normal. No respiratory distress.      Breath sounds: Wheezing present.      Comments: BIPAP 50% Support.  Abdominal:      General: There is no distension.      Palpations: Abdomen is soft.      Tenderness: There is no abdominal tenderness.   Musculoskeletal:      Cervical back: Neck supple.      Right lower leg: No edema.      Left lower leg: No edema.      Comments: Legs are Warm. Chronic Venous Insufficiency Skin Changes noted to bilateral lower extremities.   Neurological:      Mental Status: He is alert.      Comments: Alert/Awake   Psychiatric:         Behavior: Behavior normal.     Current Inpatient Medications:    Current Facility-Administered Medications:     acetaminophen tablet 650 mg, 650 mg, Oral, Q4H PRN, Jama Sarmiento MD, 650 mg at 11/13/22 0316    albuterol inhaler 2 puff, 2 puff, Inhalation, Q4H PRN, Jama Sarmiento MD    albuterol-ipratropium 2.5 mg-0.5 mg/3 mL nebulizer solution 3 mL, 3 mL, Nebulization, Q6H, Heidy SERNA  JOSE Moe, 3 mL at 11/15/22 0237    aspirin EC tablet 81 mg, 81 mg, Oral, Daily, Jama Sarmiento MD, 81 mg at 11/14/22 0859    atorvastatin tablet 40 mg, 40 mg, Oral, Daily, Jama Sarmiento MD, 40 mg at 11/14/22 0859    bisacodyL suppository 10 mg, 10 mg, Rectal, Q12H, JOSE Noland, 10 mg at 11/14/22 1136    carvediloL tablet 25 mg, 25 mg, Oral, BID, Jama Sarmiento MD, 25 mg at 11/14/22 0859    cloNIDine tablet 0.1 mg, 0.1 mg, Oral, Q8H PRN, Jama Sarmiento MD, 0.1 mg at 11/09/22 0455    dexmedetomidine (PRECEDEX) 400mcg/100mL 0.9% NaCL infusion, 0-1.4 mcg/kg/hr, Intravenous, Continuous, Zheng Guerra MD, Stopped at 11/15/22 0100    EScitalopram oxalate tablet 5 mg, 5 mg, Oral, Daily, Lore Hernández MD, 5 mg at 11/14/22 0859    fluticasone propionate 50 mcg/actuation nasal spray 50 mcg, 1 spray, Each Nostril, Daily, Jama Sarmiento MD, 50 mcg at 11/14/22 1137    furosemide tablet 40 mg, 40 mg, Oral, Daily, Jama Sarmiento MD, 40 mg at 11/14/22 0859    HYDROcodone-acetaminophen  mg per tablet 1 tablet, 1 tablet, Oral, Q4H PRN, Jama Sarmiento MD, 1 tablet at 11/13/22 1620    lacosamide (VIMPAT) 100 mg in sodium chloride 0.9% 100 mL IVPB, 100 mg, Intravenous, Q12H, Magdy Vanegas DO, Stopped at 11/15/22 0523    lamoTRIgine tablet 200 mg, 200 mg, Oral, BID, Jama Sarmiento MD, 200 mg at 11/14/22 0859    mupirocin 2 % ointment, , Nasal, BID, Moreno Ceja MD    NIFEdipine 24 hr tablet 60 mg, 60 mg, Oral, Daily, Jama Sarmiento MD, 60 mg at 11/13/22 0958    NORepinephrine 32 mg in dextrose 5 % 250 mL infusion, 0-3 mcg/kg/min, Intravenous, Continuous, Zheng Guerra MD, Last Rate: 23.9 mL/hr at 11/15/22 0722, 0.75 mcg/kg/min at 11/15/22 0722    oxacillin 2 g in sodium chloride 0.9 % 100 mL IVPB (MB+), 2 g, Intravenous, Q4H, JOSE Noland, Last Rate: 200 mL/hr at 11/15/22 0803, 2 g at 11/15/22 0803    [START ON 11/16/2022] pantoprazole injection 40 mg, 40 mg, Intravenous,  Daily, Magdy Vanegas,     rivaroxaban tablet 10 mg, 10 mg, Oral, Daily with dinner, Jama Sarmiento MD, 10 mg at 11/13/22 1620    Flushing PICC Protocol, , , Until Discontinued **AND** sodium chloride 0.9% flush 10 mL, 10 mL, Intravenous, Q6H, 10 mL at 11/15/22 0600 **AND** sodium chloride 0.9% flush 10 mL, 10 mL, Intravenous, PRN, Zheng Guerra MD    valsartan tablet 80 mg, 80 mg, Oral, BID, Jama Sarmiento MD, 80 mg at 11/13/22 2007    Pharmacy to dose Vancomycin consult, , , Once **AND** vancomycin - pharmacy to dose, , Intravenous, pharmacy to manage frequency, Zak Goldsmith MD    vasopressin (PITRESSIN) 0.2 Units/mL in dextrose 5 % 100 mL infusion, 0.04 Units/min, Intravenous, Continuous, Zheng Guerra MD, Last Rate: 12 mL/hr at 11/15/22 0426, 0.04 Units/min at 11/15/22 0426    zinc oxide-cod liver oil 40 % paste, , Topical (Top), BID, Moreno Ceja MD, Given at 11/14/22 2100    VTE Risk Mitigation (From admission, onward)           Ordered     rivaroxaban tablet 10 mg  With dinner         11/10/22 0835                  Assessment:   NSTEMI- Suspect Type II in the Setting of Septic Shock    - Recent Atypical Chest Pain at [facility] (Evaluated on 11.13.22 on Initial Consultation)  Septic Shock Requiring Vasopressor Support    - MSSA Bacteremia  CAD (By Calcium Score, Normal Coronaries in 2017)    - EF 55%  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 On Xarelto ? History of CVA  Hold all Antihypertensive Agents for Now Re: Hypotension Requiring  Vasopressor Support  Recommend VQ Scan when Clinically Stable Re: Rule out Pulmonary Embolism in the Setting of RV Strain  Unable to Perform SHANIQUA at this time given BIPAP Support and History of Esophageal Stricture.  Antibiotic Management as per Primary Team  Continue Supportive Care as per Primary Team    JOSE Chong  Cardiology  [facility] - [facility] North ICU  11/15/2022

## 2022-11-15 NOTE — PROGRESS NOTES
[facility] - [facility]  Pulmonary Critical Care Note    Patient Name: Tim Meneses  MRN: [id number]  Admission Date: 11/7/2022  Hospital Length of Stay: 8 days  Code Status: DNR  Attending Provider: Moreno Ceja MD  Primary Care Provider: Primary Doctor No     Subjective:     HPI:   This is a 77-year-old male with history of CVA, seizure disorder on lamotrigine and Vimpat, recurrent bradycardia status post pacemaker placement and hepatitis who was upgraded to the ICU after a RRT on the floor for altered mental status and hypotension.  The patient was originally admitted on 11/07 status post multiple falls at home with right-sided drift, dizziness and weakness.  Both Neurology and Neurosurgery were consulted on admission.  MRI brain and cervical spine were done.  MRI brain showed no acute changes but the C-spine imaging showed a C4 fracture.  Neurology initially started the patient on Sinemet and a complete neuro workup was negative.  Blood cultures were then positive 2/2 bottles for Gram-positive cocci probable staph.  Infectious Disease was consulted.  Patient was initially started on vancomycin but was switched to oxacillin after speciation return.  Today, patient was noted to have altered and hypotensive an RRT was called.  Patient was given about 1.5 L of fluid on the floor without any improvement of blood pressure.  The patient was given a L of fluids and then transferred to the ICU.  On arrival, patient is on 0.5 Levophed and a non-rebreather mask.      Hospital Course/Significant events:  11/15 transferred to ICU after episode of hypotension, started on Levophed and placed on BiPAP    24 Hour Interval History:  Patient started on vasopressor support, appropriately fluid resuscitated. Oxygenation remained stable once he was placed on BiPAP, continued to do well with this.  Had troubles making urine overnight, has had minimal to no urine output over the last 4 hours.  Kidney function  continuing to worsen as well.  Patient with an occasional twitching of upper extremity, unsure if seizure related.  Troponin also continuing to increase.  Patient appears mostly comfortable, does not state he is in any pain.  Remains lethargic overall    Past Medical History:   Diagnosis Date    COPD (chronic obstructive pulmonary disease)     Hepatitis     Hypercholesterolemia     Hypertension     Malnutrition related to chronic disease     Seizure     Subacromial impingement of right shoulder     Ventricular tachycardia      Past Surgical History:   Procedure Laterality Date    APPENDECTOMY      CARDIAC PACEMAKER PLACEMENT       Social History     Socioeconomic History    Marital status:    Tobacco Use    Smoking status: Every Day     Packs/day: 1.00     Types: Cigarettes     Start date: 1961    Smokeless tobacco: Never   Substance and Sexual Activity    Alcohol use: Yes     Comment: 1-2 times per year    Drug use: Never    Sexual activity: Not Currently     Current Outpatient Medications   Medication Instructions    albuterol (PROVENTIL/VENTOLIN HFA) 90 mcg/actuation inhaler 2 puffs, Inhalation, Every 4 hours PRN    aspirin (ECOTRIN) 81 mg, Oral, Daily    atorvastatin (LIPITOR) 40 MG tablet 1 tablet, Oral, Daily    carvediloL (COREG) 25 MG tablet 1 tablet, Oral, 2 times daily    cetirizine (ZYRTEC) 10 mg, Oral, Daily    cloNIDine (CATAPRES) 0.1 MG tablet 1 tablet, Oral, Every 8 hours PRN, Take 1 tablet orally every 8hrs as needed for SBP greater than 175 or DBP greater than 100    EScitalopram oxalate (LEXAPRO) 5 MG Tab 1 tablet, Oral, Daily    fluticasone propionate (FLONASE) 50 mcg/actuation nasal spray 1 spray, Each Nostril, Daily    furosemide (LASIX) 40 MG tablet 1 tablet, Oral, Daily    lacosamide (VIMPAT) 100 mg, Oral, Every 12 hours    lamoTRIgine (LAMICTAL) 200 MG tablet 1 po q am; 1.5 po qhs    NIFEdipine (PROCARDIA-XL) 60 mg, Oral, Daily   Current Inpatient Medications   albuterol-ipratropium  3  mL Nebulization Q6H    aspirin  81 mg Oral Daily    atorvastatin  40 mg Oral Daily    bisacodyL  10 mg Rectal Q12H    carvediloL  25 mg Oral BID    EScitalopram oxalate  5 mg Oral Daily    fluticasone propionate  1 spray Each Nostril Daily    furosemide  40 mg Oral Daily    lacosamide (VIMPAT) IVPB  100 mg Intravenous Q12H    lamoTRIgine  200 mg Oral BID    mupirocin   Nasal BID    NIFEdipine  60 mg Oral Daily    oxacillin IVPB  2 g Intravenous Q4H    [START ON 11/16/2022] pantoprazole  40 mg Intravenous Daily    rivaroxaban  10 mg Oral Daily with dinner    sodium chloride 0.9%  10 mL Intravenous Q6H    valsartan  80 mg Oral BID    zinc oxide-cod liver oil   Topical (Top) BID   Current Intravenous Infusions   dexmedetomidine (PRECEDEX) infusion Stopped (11/15/22 0100)    NORepinephrine bitartrate-D5W 0.88 mcg/kg/min (11/15/22 0620)    vasopressin 0.04 Units/min (11/15/22 0426)     Review of Systems   Unable to perform ROS: Acuity of condition      Objective:       Intake/Output Summary (Last 24 hours) at 11/15/2022 0659  Last data filed at 11/15/2022 0600  Gross per 24 hour   Intake 482 ml   Output 95 ml   Net 387 ml     Vital Signs (Most Recent):  Temp: 97.8 °F (36.6 °C) (11/15/22 0400)  Pulse: 76 (11/15/22 0615)  Resp: (!) 27 (11/15/22 0615)  BP: 131/64 (11/15/22 0615)  SpO2: 99 % (11/15/22 0615)    Body mass index is 24.96 kg/m².  Weight: 68 kg (150 lb) Vital Signs (24h Range):  Temp:  [95.5 °F (35.3 °C)-97.9 °F (36.6 °C)] 97.8 °F (36.6 °C)  Pulse:  [60-82] 76  Resp:  [15-27] 27  SpO2:  [85 %-100 %] 99 %  BP: ()/(30-88) 131/64  Arterial Line BP: (110-167)/(44-69) 123/46   Physical Exam  Vitals and nursing note reviewed.   Constitutional:       General: He is not in acute distress.     Comments: Patient is alert but is mumbling. Patient appears to be shivering   HENT:      Nose: Nose normal. No congestion or rhinorrhea.   Eyes:      Extraocular Movements: Extraocular movements intact.      Pupils: Pupils are  equal, round, and reactive to light.   Cardiovascular:      Rate and Rhythm: Normal rate and regular rhythm.      Pulses: Normal pulses.      Heart sounds: No murmur heard.  Pulmonary:      Effort: Pulmonary effort is normal. No respiratory distress.      Breath sounds: Normal breath sounds.   Abdominal:      General: Abdomen is flat. There is no distension.      Palpations: Abdomen is soft.      Tenderness: There is no abdominal tenderness.   Musculoskeletal:         General: No swelling or tenderness. Normal range of motion.      Cervical back: Normal range of motion and neck supple.   Skin:     General: Skin is warm and dry.      Capillary Refill: Capillary refill takes more than 3 seconds.      Coloration: Skin is not jaundiced.   Neurological:      Mental Status: He is alert.      Comments: Patient is alert and oriented to self. Is able to carry a conversion and has spontaneous movements but is not able to complete a neurological exam   Lines/Drains/Airways       Peripherally Inserted Central Catheter Line  Duration             PICC Triple Lumen 11/15/22 0018 right basilic <1 day              Drain  Duration                  Urethral Catheter 11/14/22 1745 16 Fr. <1 day              Arterial Line  Duration             Arterial Line 11/14/22 2230 Right Radial <1 day              Peripheral Intravenous Line  Duration                  Peripheral IV - Single Lumen 11/14/22 1744 Anterior;Left Forearm <1 day         Peripheral IV - Single Lumen 11/14/22 1744 Left Antecubital <1 day         Peripheral IV - Single Lumen 11/14/22 1745 Anterior;Proximal;Right Forearm <1 day                Significant Labs:  Lab Results   Component Value Date    WBC 18.9 (H) 11/15/2022    HGB 10.4 (L) 11/15/2022    HCT 32.8 (L) 11/15/2022    MCV 87.0 11/15/2022     11/15/2022   BMP  Lab Results   Component Value Date     (L) 11/15/2022    K 4.5 11/15/2022    CO2 25 11/15/2022    BUN 68.0 (H) 11/15/2022    CREATININE 4.23 (H)  11/15/2022    CALCIUM 7.3 (L) 11/15/2022   ABG  Recent Labs   Lab 11/14/22 2042   PH 7.32*   PO2 82   PCO2 54*   HCO3 27.8*   Mechanical Ventilation Support:  Oxygen Concentration (%): 50 (11/14/22 2137)  Significant Imaging:  I have reviewed the pertinent imaging within the past 24 hours.    Assessment/Plan:     Assessment  Shock, septic vs  obstructive vs cardiogenic  MSSA bacteremia  Acute Respiratory Failure requiring Non re-breather  Right ventricular systolic dysfunction with severe right ventricular enlargement (echocardiogram on 11/14/2022)  Esophageal stricture s/p esophageal dilation   Anxiety  Previous CVA  Seizure Disorder  C4 Fracture  CKD Stage II-III  Sick-Sinus syndrome s/p pacemaker placement  COPD     Plan  -patient received 2.5 L of IV fluid resuscitation since rapid response, appears to be euvolemic at this time.  Continue Levophed and vasopressin to keep map greater than 65.  Etiology of new shock unknown.  Likely septic versus cardiogenic.  Continuing to evaluate  -Infectious Disease on case, appreciate all recommendations.  Treating bacteremia with vancomycin and oxacillin.  Suspect source of bacteremia as skin abrasions.  No episodes of fever overnight.  -kidney function continuing to worsen, little to no urine output since 4:00 a.m..    -troponin elevated and plateaued at 5.38, no ischemic changes on EKG.  Consulting Cardiology for any further recommendations.  Patient on full-dose Lovenox at this time   -was some concern last night for possible pulmonary embolism, unable to get CT because of elevated kidney function.     Magdy Vanegas, DO  Pulmonary Critical Care Medicine  [facility] - [facility]

## 2022-11-16 NOTE — PT/OT/SLP PROGRESS
Occupational Therapy      Patient Name:  Tim Meneses   MRN:  [id number]    Per notes this AM, pt is still requiring significant vasopressor support with levophed and vasopressin, and he had multiple episodes of sustained ventricular tachycardia overnight.  OT services are not appropriate at this time. Medical team is continuing discussions with family regarding goals of care d/t guarded prognosis. Will f/u tomorrow if medical stability improves.    11/16/2022

## 2022-11-16 NOTE — PROGRESS NOTES
[facility] - [facility]  Pulmonary Critical Care Note    Patient Name: Tim Meneses  MRN: [id number]  Admission Date: 11/7/2022  Hospital Length of Stay: 9 days  Code Status: DNR  Attending Provider: Cheo Garcia MD  Primary Care Provider: Primary Doctor No     Subjective:     HPI:   This is a 77-year-old male with history of CVA, seizure disorder on lamotrigine and Vimpat, recurrent bradycardia status post pacemaker placement and hepatitis who was upgraded to the ICU after a RRT on the floor for altered mental status and hypotension.  The patient was originally admitted on 11/07 status post multiple falls at home with right-sided drift, dizziness and weakness.  Both Neurology and Neurosurgery were consulted on admission.  MRI brain and cervical spine were done.  MRI brain showed no acute changes but the C-spine imaging showed a C4 fracture.  Neurology initially started the patient on Sinemet and a complete neuro workup was negative.  Blood cultures were then positive 2/2 bottles for Gram-positive cocci probable staph.  Infectious Disease was consulted.  Patient was initially started on vancomycin but was switched to oxacillin after speciation return.  Today, patient was noted to have altered and hypotensive an RRT was called.  Patient was given about 1.5 L of fluid on the floor without any improvement of blood pressure.  The patient was given a L of fluids and then transferred to the ICU.  On arrival, patient is on 0.5 Levophed and a non-rebreather mask.      Hospital Course/Significant events:  11/15 transferred to ICU after episode of hypotension, started on Levophed and placed on BiPAP    24 Hour Interval History:  Patient with some extended periods of ventricular tachycardia overnight, remained hemodynamically stable during these events.  Given amiodarone bolus and started on amiodarone infusion after.  Patient continues to make small amount of urine, kidney function seems to have mostly  plateaued.  Placed back on BiPAP overnight, ABG drawn all in good range.  No episodes of fever, has remained virtually on same amount of vasopressor support overnight.  Patient remains lethargic, does not appear acutely distressed.    Past Medical History:   Diagnosis Date    COPD (chronic obstructive pulmonary disease)     Hepatitis     Hypercholesterolemia     Hypertension     Malnutrition related to chronic disease     Seizure     Subacromial impingement of right shoulder     Ventricular tachycardia      Past Surgical History:   Procedure Laterality Date    APPENDECTOMY      CARDIAC PACEMAKER PLACEMENT       Social History     Socioeconomic History    Marital status:    Tobacco Use    Smoking status: Every Day     Packs/day: 1.00     Types: Cigarettes     Start date: 1961    Smokeless tobacco: Never   Substance and Sexual Activity    Alcohol use: Yes     Comment: 1-2 times per year    Drug use: Never    Sexual activity: Not Currently     Current Outpatient Medications   Medication Instructions    albuterol (PROVENTIL/VENTOLIN HFA) 90 mcg/actuation inhaler 2 puffs, Inhalation, Every 4 hours PRN    aspirin (ECOTRIN) 81 mg, Oral, Daily    atorvastatin (LIPITOR) 40 MG tablet 1 tablet, Oral, Daily    carvediloL (COREG) 25 MG tablet 1 tablet, Oral, 2 times daily    cetirizine (ZYRTEC) 10 mg, Oral, Daily    cloNIDine (CATAPRES) 0.1 MG tablet 1 tablet, Oral, Every 8 hours PRN, Take 1 tablet orally every 8hrs as needed for SBP greater than 175 or DBP greater than 100    EScitalopram oxalate (LEXAPRO) 5 MG Tab 1 tablet, Oral, Daily    fluticasone propionate (FLONASE) 50 mcg/actuation nasal spray 1 spray, Each Nostril, Daily    furosemide (LASIX) 40 MG tablet 1 tablet, Oral, Daily    lacosamide (VIMPAT) 100 mg, Oral, Every 12 hours    lamoTRIgine (LAMICTAL) 200 MG tablet 1 po q am; 1.5 po qhs    NIFEdipine (PROCARDIA-XL) 60 mg, Oral, Daily   Current Inpatient Medications   aspirin  81 mg Oral Daily    atorvastatin   40 mg Oral Daily    bisacodyL  10 mg Rectal Q12H    carvediloL  3.125 mg Oral BID    enoxaparin  40 mg Subcutaneous Q12H    EScitalopram oxalate  5 mg Oral Daily    hydrocortisone sodium succinate  100 mg Intravenous Q8H    lacosamide (VIMPAT) IVPB  100 mg Intravenous Q12H    lamoTRIgine  200 mg Oral BID    mupirocin   Nasal BID    oxacillin IVPB  2 g Intravenous Q4H    pantoprazole  40 mg Intravenous Daily    sodium chloride 0.9%  10 mL Intravenous Q6H    zinc oxide-cod liver oil   Topical (Top) BID   Current Intravenous Infusions   sodium chloride 0.9% 75 mL/hr at 11/16/22 0249    amiodarone in dextrose 5% 1 mg/min (11/16/22 0306)    amiodarone in dextrose 5%      dexmedetomidine (PRECEDEX) infusion Stopped (11/15/22 0100)    NORepinephrine bitartrate-D5W 0.28 mcg/kg/min (11/16/22 0524)    vasopressin 0.04 Units/min (11/15/22 2305)     Review of Systems   Unable to perform ROS: Critical illness      Objective:       Intake/Output Summary (Last 24 hours) at 11/16/2022 0543  Last data filed at 11/16/2022 0400  Gross per 24 hour   Intake 1786.3 ml   Output 570 ml   Net 1216.3 ml     Vital Signs (Most Recent):  Temp: 98 °F (36.7 °C) (11/16/22 0400)  Pulse: 76 (11/16/22 0415)  Resp: (!) 25 (11/16/22 0415)  BP: 117/74 (11/16/22 0330)  SpO2: (!) 85 % (11/16/22 0415)    Body mass index is 24.96 kg/m².  Weight: 68 kg (150 lb) Vital Signs (24h Range):  Temp:  [98 °F (36.7 °C)-99.5 °F (37.5 °C)] 98 °F (36.7 °C)  Pulse:  [] 76  Resp:  [13-34] 25  SpO2:  [80 %-100 %] 85 %  BP: ()/(39-97) 117/74  Arterial Line BP: (0-152)/(0-51) 132/51     Physical Exam  Vitals and nursing note reviewed.   Constitutional:       General: He is not in acute distress.     Comments: Patient is alert but lethargic appearing, BiPAP in place  HENT:      Nose: Nose normal. No congestion or rhinorrhea.   Eyes:      Extraocular Movements: Extraocular movements intact.      Pupils: Pupils are equal, round, and reactive to light.    Cardiovascular:      Rate and Rhythm: Normal rate and regular rhythm.      Pulses: Normal pulses.      Heart sounds: No murmur heard.  Pulmonary:      Effort: Pulmonary effort is normal. No respiratory distress.      Breath sounds:  Rhonchi present, based on left side  Abdominal:      General: Abdomen is flat. There is no distension.      Palpations: Abdomen is soft.      Tenderness: There is no abdominal tenderness.   Musculoskeletal:         General: No swelling or tenderness. Normal range of motion.      Cervical back: Normal range of motion and neck supple.   Skin:     General: Skin is warm and dry.   Neurological:      Mental Status: He is alert.      Comments: Patient is alert but lethargic.  Still has spontaneous movements, not able to complete full neuro exam.  Reflexes intact, pupils reactive.  Exam seems unchanged from yesterday    Lines/Drains/Airways       Peripherally Inserted Central Catheter Line  Duration             PICC Triple Lumen 11/15/22 0018 right basilic 1 day              Drain  Duration                  Urethral Catheter 11/14/22 1745 16 Fr. 1 day              Arterial Line  Duration             Arterial Line 11/14/22 2230 Right Radial 1 day              Peripheral Intravenous Line  Duration                  Peripheral IV - Single Lumen 11/14/22 1744 Anterior;Left Forearm 1 day         Peripheral IV - Single Lumen 11/14/22 1744 Left Antecubital 1 day         Peripheral IV - Single Lumen 11/14/22 1745 Anterior;Proximal;Right Forearm 1 day                Significant Labs:  Lab Results   Component Value Date    WBC 20.9 (H) 11/16/2022    HGB 8.4 (L) 11/16/2022    HCT 25.6 (L) 11/16/2022    MCV 86.5 11/16/2022     11/16/2022   BMP  Lab Results   Component Value Date     (L) 11/16/2022    K 4.8 11/16/2022    CO2 21 (L) 11/16/2022    BUN 75.7 (H) 11/16/2022    CREATININE 4.26 (H) 11/16/2022    CALCIUM 6.6 (LL) 11/16/2022   ABG  Recent Labs   Lab 11/16/22  0338   PH 7.35   PO2 98    PCO2 43   HCO3 23.7   Mechanical Ventilation Support:  Oxygen Concentration (%): 50 (11/16/22 0013)  Significant Imaging:  I have reviewed the pertinent imaging within the past 24 hours.  Assessment/Plan:     Assessment  Shock, likely septic  MSSA bacteremia  Acute Respiratory Failure requiring Non re-breather  Right ventricular systolic dysfunction with severe right ventricular enlargement (echocardiogram on 11/14/2022)  Esophageal stricture s/p esophageal dilation   Anxiety  Previous CVA  Seizure Disorder  GIUSEPPE on CKD  Sick-Sinus syndrome s/p pacemaker placement  COPD    Plan  -patient placed back on BiPAP overnight, seems to be comfortable and saturating well.  Can trial patient back on OxyMask this morning  -still requiring significant vasopressor support with levophed and vasopressin, continuing to wean as tolerated  -multiple episodes of sustained ventricular tachycardia overnight.  Give amiodarone bolus and patient is now on infusion with no further episodes of VT   -kidney function stable from yesterday, likely element of ATN from profound hypotension.  continuing to make small amounts of urine.  Nephrology continuing to follow.  Continuing NS at 75 mL/hr, other renal workup still pending.  Seeing how patient does with conservative therapy  -remains on oxacillin, vancomycin was stopped yesterday and blood cultures repeated.  No fevers overnight.  Source of bacteremia still clear, imaging yesterday showed possible element of colitis.  No other acute findings  -Vimpat and Lamictal for seizure prophylaxis  -continuing discussions with family goals of care.  Prognosis guarded     Magdy Vanegas,   Pulmonary Critical Care Medicine  [facility] - [facility]

## 2022-11-16 NOTE — PROGRESS NOTES
PROGRESS NOTE    SUBJECTIVE: Off BiPAP now.  Required Precedex overnight for sedation, now off.  Still somewhat lethargic although better than yesterday.  Follows commands readily.  AF.  Remains on pressors, less today.  No significant events overnight noted.          REVIEW OF SYSTEMS: Unobtainable         MEDICATIONS:   Reviewed in EMR        PHYSICAL EXAM:   Vitals:    11/16/22 0840   BP:    Pulse: 66   Resp: (!) 24   Temp:         GENERAL: In bed on Oxymask; NAD  SKIN: no rash  HEENT: sclera non-icteric; PERRL  NECK: supple; no LAD  CHEST: Coarse, nonlabored, equal expansion  CARDIOVASCULAR: RRR, S1S2; no murmur, equal peripheral pulses; no edema  ABDOMEN:  active bowel sounds; abdomen soft, nondistended, nontender to palpation  GENITOURINARY: no suprapubic tenderness  EXTREMITIES: no cyanosis or clubbing  NEURO: Lethargic although wakes and follows commands readily        LABORATORY DATA: Reviewed         RADIOLOGICAL DATA: Reviewed       IMPRESSION:   He is a 77-year-old male with a past medical history of COPD and pacemaker status who presented with confusion and increased falls for 1 week.  He had an GIUSEPPE o CKD on admit, otherwise was stable.  During course of hospitalization, he underwent esophageal dilation for suspected esophageal stricture.  He then became febrile several days later, and blood cultures are positive for Staphylococcus aureus.  Id has been consulted per [employer] policy for assistance.    MSSA bacteremia, source not clear  Septic shock  GIUSEPPE on CKD  Old c-spine fracture  Vomiting, s/p esophageal dilation - resolved  PPM status  COPD  Constipation  Recent falls    PLAN:  CT findings noted - no clear evidence of source of bacteremia.   Improving slowly.  Needs SHANIQUA when stable - cardiology aware.   F/u repeat BCx sent today.   Would still like to obtain MRIs of the spine if feasible.   Continue oxacillin.  Discussed with nursing.

## 2022-11-16 NOTE — PROGRESS NOTES
NEPHROLOGY PROGRESS NOTE  [facility]     Patient Seen Date: 11/16/2022  Patient Seen Time: 11:27 AM     Reason for consult: GIUSEPPE on ?CKD Stage IV       HPI: 77 year-old male with COPD, HTN, HLD, and apparent chronic kidney disease without clear etiology, presented to the hospital with complaints of dizziness. The pt had multiple falls at home, and an additional fall here during his hospital stay. He was found to have a severely decreased BP and was transferred to the ICU for further care on 11/14/22. Scr on admission (11/7/22) was increased to 2.71, however was found to decreased to 1.32 on 11/10/22, and has since increased to 4.23 today. The patient has decreased urine output. He has developed MSSA bacteremia without clear source. Nephrology service is consulted for GIUSEPPE on ?CKD.     Interval History: Pt seen and examined at bedside in ICU setting. Pt is moderately confused after being awoken from sleep. Per nursing, the pt has improved UOP overnight after being started on maintenance IVFs.     Review of Systems:  Unable to obtain 2/2 pt condition.      Current Facility-Administered Medications:     0.9%  NaCl infusion, , Intravenous, Continuous, Misha Thorne DO, Last Rate: 75 mL/hr at 11/16/22 0249, New Bag at 11/16/22 0249    acetaminophen tablet 650 mg, 650 mg, Oral, Q4H PRN, Jama Sarmiento MD, 650 mg at 11/13/22 0316    albuterol inhaler 2 puff, 2 puff, Inhalation, Q4H PRN, Jama Sarmiento MD    amiodarone 360 mg/200 mL (1.8 mg/mL) infusion, 0.5 mg/min, Intravenous, Continuous, Magdy Vanegas DO, Last Rate: 16.7 mL/hr at 11/16/22 0924, 0.5 mg/min at 11/16/22 0924    aspirin EC tablet 81 mg, 81 mg, Oral, Daily, Jama Sarmiento MD, 81 mg at 11/14/22 0859    atorvastatin tablet 40 mg, 40 mg, Oral, Daily, Jama Sarmiento MD, 40 mg at 11/14/22 0859    bisacodyL suppository 10 mg, 10 mg, Rectal, Q12H, JOSE Noland, 10 mg at 11/16/22 0822    carvediloL tablet 3.125 mg, 3.125 mg, Oral,  BID, Magdy Vanegas DO    dexmedetomidine (PRECEDEX) 400mcg/100mL 0.9% NaCL infusion, 0-1.4 mcg/kg/hr, Intravenous, Continuous, Zheng Guerra MD, Last Rate: 6.8 mL/hr at 11/16/22 1043, 0.4 mcg/kg/hr at 11/16/22 1043    enoxaparin injection 30 mg, 30 mg, Subcutaneous, Q12H, Cheo Garcia MD    EScitalopram oxalate tablet 5 mg, 5 mg, Oral, Daily, Lore Hernández MD, 5 mg at 11/14/22 0859    HYDROcodone-acetaminophen  mg per tablet 1 tablet, 1 tablet, Oral, Q4H PRN, Jama Sarmiento MD, 1 tablet at 11/13/22 1620    hydrocortisone sodium succinate injection 100 mg, 100 mg, Intravenous, Q8H, Cheo Garcia MD, 100 mg at 11/16/22 0549    lacosamide (VIMPAT) 100 mg in sodium chloride 0.9% 100 mL IVPB, 100 mg, Intravenous, Q12H, Magdy Vanegas DO, Stopped at 11/16/22 0619    lamoTRIgine tablet 200 mg, 200 mg, Oral, BID, Jama Sarmiento MD, 200 mg at 11/15/22 2008    mupirocin 2 % ointment, , Nasal, BID, Moreno Ceja MD, Given at 11/16/22 0822    NORepinephrine 32 mg in dextrose 5 % 250 mL infusion, 0-3 mcg/kg/min, Intravenous, Continuous, Zheng Guerra MD, Last Rate: 8.9 mL/hr at 11/16/22 0524, 0.28 mcg/kg/min at 11/16/22 0524    oxacillin 2 g in sodium chloride 0.9 % 100 mL IVPB (MB+), 2 g, Intravenous, Q4H, JOSE Noland, Stopped at 11/16/22 0852    pantoprazole injection 40 mg, 40 mg, Intravenous, Daily, Magdy Vanegas DO, 40 mg at 11/16/22 0822    Flushing PICC Protocol, , , Until Discontinued **AND** sodium chloride 0.9% flush 10 mL, 10 mL, Intravenous, Q6H, 10 mL at 11/16/22 0600 **AND** sodium chloride 0.9% flush 10 mL, 10 mL, Intravenous, PRN, Zheng Guerra MD    vasopressin (PITRESSIN) 0.2 Units/mL in dextrose 5 % 100 mL infusion, 0.04 Units/min, Intravenous, Continuous, Zheng Guerra MD, Last Rate: 12 mL/hr at 11/16/22 0822, 0.04 Units/min at 11/16/22 0822    zinc oxide-cod liver oil 40 % paste, , Topical (Top), BID, Moreno Ceja MD, Given at 11/16/22 0822    Vital  Signs (24 h):  Temp:  [98 °F (36.7 °C)-99.5 °F (37.5 °C)] 98 °F (36.7 °C)  Pulse:  [] 66  Resp:  [13-34] 24  SpO2:  [80 %-100 %] 98 %  BP: ()/() 135/112  Arterial Line BP: (0-152)/(0-51) 131/48   I/O last 3 completed shifts:  In: 3030.3 [I.V.:1855.3; IV Piggyback:1175]  Out: 720 [Urine:720]  No intake/output data recorded.    Physical Exam:  General: NAD, ill-appearance   HEENT: + facemask O2  CVS: S1/S2 present and normal. No murmur/rubs/gallop.      RS: decreased bibasilar breath sounds  Abdominal: Soft, NT/ND  Extremities: No edema b/l LE  Skin: No rash, no lesions.  Neurological: No focal deficits.  Psych: Normal affect  Dialysis Access: None    Results:    Lab Results   Component Value Date     (L) 11/16/2022    K 4.7 11/16/2022    CO2 23 11/16/2022    BUN 83.1 (H) 11/16/2022    CREATININE 4.60 (H) 11/16/2022    CALCIUM 6.3 (LL) 11/16/2022    PHOS 5.4 (H) 11/16/2022    WBC 20.9 (H) 11/16/2022    HGB 8.4 (L) 11/16/2022    HCT 25.6 (L) 11/16/2022     11/16/2022       Assessment and Plan:      GIUSEPPE on ?CKD Stage Unknown (likely 3-4).  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 Pt with known kidney disease in the past, however has not been evaluated by a nephrologist as an outpatient. Upon lab review of Epic, Scr was 1.99 on 11/20/21. Scr on admission (11/7/22) was increased to 2.71, however was found to decreased to 1.32 on 11/10/22, and has since increased to 4.60 today. UA with 1+ protein and trace ketones, otherwise with inactive sediment. No renal ultrasound completed during this hospitalization. Pt is oliguric.  - FENa is > 5%, suggesting likely ATN. Spot urine TP/CR is elevated at 1.4, however in non-nephrotic range.  - Continue BP support. Continue IVFs. Monitor for signs of fluid overload.  - Hepatitis and HIV are negative. C3 and C4 are not low. Follow-up BHARATI and ANCA.  - Discussed role of RRT with daughter at bedside. She will  discuss this with family. We will monitor his response to conservative therapy before consideration of RRT. At current there are no clear indications for initiation of RRT.  - Monitor labs. Avoid potential nephrotoxins.    Thank you for your consult. Please feel free to reach me with any questions.  Plan for follow-up tomorrow.    Misha Thorne DO  Interventional Nephrology  [facility] ([place]  Cell: [phone]  Office: [phone]

## 2022-11-16 NOTE — PROGRESS NOTES
[facility] - [facility] North ICU  Cardiology  Progress Note    Patient Name: Tim Meneses  MRN: [id number]  Admission Date: 11/7/2022  Hospital Length of Stay: 9 days  Code Status: DNR   Attending Physician: Cheo Garcia MD   Primary Care Physician: Primary Doctor No  Expected Discharge Date:   Principal Problem:<principal problem not specified>    Subjective:   Consultation Reason: Initial- Chest Pain at [facility] Site, Re-consult- NSTEMI    HPI:   Mr. Meneses is a 77 year old male, known to Dr. Maria/Dr. Wheeler, who presented to the hospital with altered mental status and hypotension. He was originally admitted with frequent falls. MRI Brain was performed due to stroke-like symptoms, but was negative/no acute changes. C-Spine imaging revealed C4 fracture. He was noted to have positive blood cultures. ID adjusting antibiotics. He was noted to be hypotensive (11.15.22) which required RRT and fluid administration which helped the hypotension. Troponin values noted to be elevated with peak value of 5.3 with trend down. He does have chronic kidney disease with elevated creatinine. Echocardiogram on 11.14.22 revealed intact LV Function with positive Madden's Sign. [facility] is consulted for cardiac evaluation concerning the elevated troponin.  Of note, we were initially consulted on 11.13.22 for chest pain at [facility] site evaluation.    Hospital Course:   11.15.22: Re-consult in the setting of elevated troponin. Hypotensive on Vasopressor Support. SR on Tele. BIPAP in Place.   11.16.22: Patient is resting, but minimally response. On BIPAP. Remains on Pressor support. Started on Amiodarone overnight for what appears to be a paced rhythm, concern was for VT. He is anemic. Patient's family is at bedside.     PMH: CAD (By CACS. Normal Coronaries in 2017), CACS (1685/90th Percentile 9.11.19), CVD- BICA 1-39%, Hypertension, Hyperlipidemia, CKD, SSS/PPM, NSVT (Negative EP Study), Seizure, Chronic Tobacco Use, Frequent  Falls, Seizures  PSH: Pacemaker ([facility]), Angiogram,, EP Study, Tonsillectomy, Appendectomy  Family History: No Significant Family History is Noted  Social History: Tobacco- Active Smoker, Alcohol- Negative, Substance Abuse- Negative    Previous Cardiac Diagnostics:   Echocardiogram (11.14.22):  The left ventricle is normal in size with concentric remodeling and normal systolic function. The estimated ejection fraction is 55%.  Severe right ventricular enlargement with moderately reduced right ventricular systolic function. Madden's sign is present. Please correlate clinically for the presence of an acute pulmonary embolism.  Madden sign is present.  Please correlate clinically for the presence of an acute pulmonary embolism.    Carotid US (2.22.21):  The study quality is average.   1-39% stenosis in the proximal right internal carotid artery based on Bluth Criteria.   1-39% stenosis in the proximal left internal carotid artery based on Bluth Criteria.   Antegrade right vertebral artery flow.   Antegrade left vertebral artery flow.     EP Study (12.12.17):  Sinus Node Dysfunction. Abnormal Supra-hisian AV Radha Disease, No Inducible Sustained Arrhythmia.    Coronary Angiogram (12.11.17):  Normal Coronaries    Review of Systems   Unable to perform ROS: Acuity of condition   BIPAP in Place.     Objective:     Vital Signs (Most Recent):  Temp: 98 °F (36.7 °C) (11/16/22 0400)  Pulse: 66 (11/16/22 0840)  Resp: (!) 24 (11/16/22 0840)  BP: (!) 135/112 (11/16/22 0615)  SpO2: 98 % (11/16/22 0840)   Vital Signs (24h Range):  Temp:  [98 °F (36.7 °C)-99.5 °F (37.5 °C)] 98 °F (36.7 °C)  Pulse:  [] 66  Resp:  [13-34] 24  SpO2:  [80 %-100 %] 98 %  BP: ()/() 135/112  Arterial Line BP: (0-152)/(0-51) 131/48     Weight: 68 kg (150 lb)  Body mass index is 24.96 kg/m².    SpO2: 98 %  O2 Device (Oxygen Therapy): BiPAP      Intake/Output Summary (Last 24 hours) at 11/16/2022 1212  Last data filed at  11/16/2022 0600  Gross per 24 hour   Intake 2548.3 ml   Output 570 ml   Net 1978.3 ml         Lines/Drains/Airways       Peripherally Inserted Central Catheter Line  Duration             PICC Triple Lumen 11/15/22 0018 right basilic 1 day              Drain  Duration                  Urethral Catheter 11/14/22 1745 16 Fr. 1 day              Arterial Line  Duration             Arterial Line 11/14/22 2230 Right Radial 1 day              Peripheral Intravenous Line  Duration                  Peripheral IV - Single Lumen 11/14/22 1744 Anterior;Left Forearm 1 day         Peripheral IV - Single Lumen 11/14/22 1744 Left Antecubital 1 day         Peripheral IV - Single Lumen 11/14/22 1745 Anterior;Proximal;Right Forearm 1 day                    Significant Labs:   Recent Results (from the past 72 hour(s))   Respiratory Panel    Collection Time: 11/13/22  5:54 PM   Result Value Ref Range    B. parapertussis (IS 1001) not detected     B. Pertussis (ptxP) not detected     Chlamydia pneumoniae not detected     Mycoplasma pneumoniae not detected     Adenovirus not detected     Coronavirus 229E not detected     Coronavirus HKU1 not detected     Coronavirus NL63 not detected     Coronavirus OC43 not detected     Human METAPNEUMOVIRUS not detected     Human ENTEROVIRUS not detected     Influenza A not detected     Influenza A H1-2009      Influenza A H3      Influenza B not detected     Parainfluenza Virus 1 not detected     Parainfluenza Virus 2 not detected     Parainfluenza Virus 3 not detected     Parainfluenza Virus 4 not detected     Respiratory Syncytial Virus not detected    Blood Culture    Collection Time: 11/14/22 10:14 AM    Specimen: Wrist, Right; Blood   Result Value Ref Range    CULTURE, BLOOD ([facility]) Methicillin Sensitive Staphylococcus aureus (A)     GRAM STAIN Gram Positive Cocci, probable Staphylococcus (AA)     GRAM STAIN Seen in gram stain of broth only (AA)     GRAM STAIN 1 of 1 Aerobic bottle positive (AA)         Susceptibility    Methicillin Sensitive Staphylococcus aureus -  (no method available)     Clindamycin <=0.25 Sensitive      Erythromycin >=8 Resistant      Gentamicin <=0.5 Sensitive      Oxacillin 0.5 Sensitive      Penicillin >=0.5 Resistant      Trimethoprim/Sulfamethoxazole <=10 Sensitive      Tetracycline <=1 Sensitive      Vancomycin 1 Sensitive    Blood Culture    Collection Time: 11/14/22 10:14 AM    Specimen: Hand, Right; Blood   Result Value Ref Range    CULTURE, BLOOD ([facility]) Methicillin Sensitive Staphylococcus aureus (A)     GRAM STAIN Gram Positive Cocci, probable Staphylococcus (AA)     GRAM STAIN Seen in gram stain of broth only (AA)     GRAM STAIN 1 of 1 Aerobic bottle positive (AA)        Susceptibility    Methicillin Sensitive Staphylococcus aureus -  (no method available)     Clindamycin <=0.25 Sensitive      Erythromycin >=8 Resistant      Gentamicin <=0.5 Sensitive      Oxacillin 0.5 Sensitive      Penicillin >=0.5 Resistant      Trimethoprim/Sulfamethoxazole <=10 Sensitive      Tetracycline <=1 Sensitive      Vancomycin 1 Sensitive    Echo    Collection Time: 11/14/22 10:43 AM   Result Value Ref Range    BSA 1.77 m2    EF 55 %    LVIDd 3.60 3.5 - 6.0 cm    IVS 1.40 0.6 - 1.1 cm    Posterior Wall 1.20 0.6 - 1.1 cm    LVIDs 1.20 2.1 - 4.0 cm    FS 67 28 - 44 %    LV mass 160.07 g    LA size 2.60 cm    Left Ventricle Relative Wall Thickness 0.67 cm    AV Velocity Ratio 0.58     AV index (prosthetic) 0.67     E/A ratio 0.83     LVOT peak hoang 0.7 m/s    LVOT peak VTI 18.70 cm    Ao peak hoang 1.2 m/s    Ao VTI 27.90 cm    AV peak gradient 6 mmHg    MV Peak E Hoang 0.50 m/s    MV Peak A Hoang 0.60 m/s    LV Mass Index 91 g/m2   POCT glucose    Collection Time: 11/14/22  3:59 PM   Result Value Ref Range    POCT Glucose 162 (H) 70 - 110 mg/dL   Comprehensive Metabolic Panel    Collection Time: 11/14/22  7:42 PM   Result Value Ref Range    Sodium Level 134 (L) 136 - 145 mmol/L    Potassium Level 4.7 3.5 -  5.1 mmol/L    Chloride 98 98 - 107 mmol/L    Carbon Dioxide 24 23 - 31 mmol/L    Glucose Level 202 (H) 82 - 115 mg/dL    Blood Urea Nitrogen 62.7 (H) 8.4 - 25.7 mg/dL    Creatinine 3.49 (H) 0.73 - 1.18 mg/dL    Calcium Level Total 7.7 (L) 8.8 - 10.0 mg/dL    Protein Total 4.9 (L) 5.8 - 7.6 gm/dL    Albumin Level 1.8 (L) 3.4 - 4.8 gm/dL    Globulin 3.1 2.4 - 3.5 gm/dL    Albumin/Globulin Ratio 0.6 (L) 1.1 - 2.0 ratio    Bilirubin Total 0.9 <=1.5 mg/dL    Alkaline Phosphatase 67 40 - 150 unit/L    Alanine Aminotransferase 9 0 - 55 unit/L    Aspartate Aminotransferase 76 (H) 5 - 34 unit/L    eGFR 17 mls/min/1.73/m2   Magnesium    Collection Time: 11/14/22  7:42 PM   Result Value Ref Range    Magnesium Level 2.00 1.60 - 2.60 mg/dL   Troponin I    Collection Time: 11/14/22  7:42 PM   Result Value Ref Range    Troponin-I 2.583 (H) 0.000 - 0.045 ng/mL   Lactic Acid, Plasma    Collection Time: 11/14/22  7:42 PM   Result Value Ref Range    Lactic Acid Level 1.9 0.5 - 2.2 mmol/L   CBC with Differential    Collection Time: 11/14/22  7:42 PM   Result Value Ref Range    WBC 17.1 (H) 4.5 - 11.5 x10(3)/mcL    RBC 3.95 (L) 4.70 - 6.10 x10(6)/mcL    Hgb 11.0 (L) 14.0 - 18.0 gm/dL    Hct 34.8 (L) 42.0 - 52.0 %    MCV 88.1 80.0 - 94.0 fL    MCH 27.8 27.0 - 31.0 pg    MCHC 31.6 (L) 33.0 - 36.0 mg/dL    RDW 13.5 11.5 - 17.0 %    Platelet 288 130 - 400 x10(3)/mcL    MPV 9.4 7.4 - 10.4 fL    Neut % 84.7 %    Lymph % 6.5 %    Mono % 7.1 %    Eos % 0.4 %    Basophil % 0.5 %    Lymph # 1.11 0.6 - 4.6 x10(3)/mcL    Neut # 14.5 (H) 2.1 - 9.2 x10(3)/mcL    Mono # 1.21 0.1 - 1.3 x10(3)/mcL    Eos # 0.06 0 - 0.9 x10(3)/mcL    Baso # 0.09 0 - 0.2 x10(3)/mcL    IG# 0.14 (H) 0 - 0.04 x10(3)/mcL    IG% 0.8 %    NRBC% 0.0 %   POCT ARTERIAL BLOOD GAS    Collection Time: 11/14/22  8:42 PM   Result Value Ref Range    POC PH 7.32 (A) 7.35 - 7.45    POC PCO2 54 (AA) 19 - 50 mmHg    POC PO2 82 80 - 100 mmHg    POC HEMOGLOBIN 10.8 (A) 12.0 - 16.0 g/dL    POC  SATURATED O2 95.0 %    POC O2Hb 94.7 94.0 - 97.0 %    POC COHb 1.8 %    POC MetHb 1.3 0.40 - 1.5 %    POC Potassium 4.3 3.5 - 5.0 mmol/l    POC Sodium 129 (A) 137 - 145 mmol/l    POC Ionized Calcium 0.97 (A) 1.1 - 1.2 mmol/l    Correct Temperature (PH) 7.32 (A) 7.35 - 7.45    Corrected Temperature (pCO2) 54 (AA) 19 - 50 mmHg    Corrected Temperature (pO2) 82 80 - 100 mmHg    POC HCO3 27.8 (A) 22.0 - 26.0 mmol/l    Base Deficit 1.0 -2.0 - 2.0 mmol/l    POC Temp 37.0 °C    Specimen source Arterial sample    Troponin I    Collection Time: 11/15/22 12:29 AM   Result Value Ref Range    Troponin-I 5.388 (H) 0.000 - 0.045 ng/mL   Vancomycin, Random    Collection Time: 11/15/22 12:29 AM   Result Value Ref Range    Vanc Lvl Random 13.9 (L) 15.0 - 20.0 ug/ml   HIV 1/2 Ag/Ab (4th Gen)    Collection Time: 11/15/22  1:50 AM   Result Value Ref Range    HIV Nonreactive Nonreactive   Hepatitis B Core Antibody, Total    Collection Time: 11/15/22  1:50 AM   Result Value Ref Range    Hepatitis B Core Antibody Nonreactive Nonreactive   SYPHILIS ANTIBODY (WITH REFLEX RPR)    Collection Time: 11/15/22  1:50 AM   Result Value Ref Range    Syphilis Antibody Nonreactive Nonreactive, Equivocal   Hepatitis B Core Antibody, IgM    Collection Time: 11/15/22  1:50 AM   Result Value Ref Range    Hepatitis B Core IgM Nonreactive Nonreactive   Hepatitis B Surface Ab, Qualitative    Collection Time: 11/15/22  1:50 AM   Result Value Ref Range    Hepatitis B Surface Antibody Nonreactive Nonreactive    Hepatitis B Surface Antibody Qnt 0.00 mIU/mL   Hepatitis B Surface Antigen    Collection Time: 11/15/22  1:50 AM   Result Value Ref Range    Hepatitis B Surface Antigen Nonreactive Nonreactive   Hepatitis C Antibody    Collection Time: 11/15/22  1:50 AM   Result Value Ref Range    Hepatitis C Antibody Nonreactive Nonreactive   Troponin I    Collection Time: 11/15/22  1:50 AM   Result Value Ref Range    Troponin-I 5.381 (H) 0.000 - 0.045 ng/mL    Comprehensive Metabolic Panel    Collection Time: 11/15/22  2:59 AM   Result Value Ref Range    Sodium Level 132 (L) 136 - 145 mmol/L    Potassium Level 4.5 3.5 - 5.1 mmol/L    Chloride 96 (L) 98 - 107 mmol/L    Carbon Dioxide 25 23 - 31 mmol/L    Glucose Level 253 (H) 82 - 115 mg/dL    Blood Urea Nitrogen 68.0 (H) 8.4 - 25.7 mg/dL    Creatinine 4.23 (H) 0.73 - 1.18 mg/dL    Calcium Level Total 7.3 (L) 8.8 - 10.0 mg/dL    Protein Total 5.1 (L) 5.8 - 7.6 gm/dL    Albumin Level 1.9 (L) 3.4 - 4.8 gm/dL    Globulin 3.2 2.4 - 3.5 gm/dL    Albumin/Globulin Ratio 0.6 (L) 1.1 - 2.0 ratio    Bilirubin Total 0.8 <=1.5 mg/dL    Alkaline Phosphatase 67 40 - 150 unit/L    Alanine Aminotransferase 9 0 - 55 unit/L    Aspartate Aminotransferase 85 (H) 5 - 34 unit/L    eGFR 14 mls/min/1.73/m2   Magnesium    Collection Time: 11/15/22  2:59 AM   Result Value Ref Range    Magnesium Level 1.90 1.60 - 2.60 mg/dL   Phosphorus    Collection Time: 11/15/22  2:59 AM   Result Value Ref Range    Phosphorus Level 6.3 (H) 2.3 - 4.7 mg/dL   CBC with Differential    Collection Time: 11/15/22  2:59 AM   Result Value Ref Range    WBC 18.9 (H) 4.5 - 11.5 x10(3)/mcL    RBC 3.77 (L) 4.70 - 6.10 x10(6)/mcL    Hgb 10.4 (L) 14.0 - 18.0 gm/dL    Hct 32.8 (L) 42.0 - 52.0 %    MCV 87.0 80.0 - 94.0 fL    MCH 27.6 27.0 - 31.0 pg    MCHC 31.7 (L) 33.0 - 36.0 mg/dL    RDW 13.4 11.5 - 17.0 %    Platelet 312 130 - 400 x10(3)/mcL    MPV 9.3 7.4 - 10.4 fL    IG# 0.23 (H) 0 - 0.04 x10(3)/mcL    IG% 1.2 %    NRBC% 0.0 %   Manual Differential    Collection Time: 11/15/22  2:59 AM   Result Value Ref Range    Neut Man 93 %    Lymph Man 4 %    Monocyte Man 3 %    Instr WBC 18.9 x10(3)/mcL    Abs Mono 0.567 0.1 - 1.3 x10(3)/mcL    Abs Lymp 0.756 0.6 - 4.6 x10(3)/mcL    Abs Neut 17.577 (H) 2.1 - 9.2 x10(3)/mcL    RBC Morph Abnormal (A) Normal    Anisocyte 1+ (A) (none)    Poik 2+ (A) (none)    Microcyte 1+ (A) (none)    Tigre Cells 2+ (A) (none)    Platelet Est Adequate  Normal, Adequate   Troponin I    Collection Time: 11/15/22  6:55 AM   Result Value Ref Range    Troponin-I 3.921 (H) 0.000 - 0.045 ng/mL   C3 Complement    Collection Time: 11/15/22  6:55 AM   Result Value Ref Range    C3 Complement 94 80 - 173 mg/dL   C4 Complement    Collection Time: 11/15/22  6:55 AM   Result Value Ref Range    C4 Complement 21.0 13.0 - 46.0 mg/dL   POCT ARTERIAL BLOOD GAS    Collection Time: 11/15/22  7:28 AM   Result Value Ref Range    POC PH 7.35     POC PCO2 49 (A) mmHg    POC PO2 92 mmHg    POC SATURATED O2 97 %    POC Potassium 4.6 mmol/l    POC Sodium 128 (A) 137 - 145 mmol/l    POC Ionized Calcium 0.87 (A) 1.1 - 1.2 mmol/l    POC HCO3 27.1 mmol/l    Base Deficit 0.80 mmol/l    POC Temp 37.0 C    Specimen source Arterial sample    Sodium, Random Urine    Collection Time: 11/15/22  2:53 PM   Result Value Ref Range    Urine Sodium 70.0 mmol/L   Protein/Creatinine Ratio, Urine    Collection Time: 11/15/22  2:53 PM   Result Value Ref Range    Urine Protein Level 56.4 mg/dL    Urine Creatinine 40.0 (L) 63.0 - 166.0 mg/dL    Urine Protein/Creatinine Ratio 1.4    Comprehensive Metabolic Panel    Collection Time: 11/16/22  1:31 AM   Result Value Ref Range    Sodium Level 133 (L) 136 - 145 mmol/L    Potassium Level 4.8 3.5 - 5.1 mmol/L    Chloride 100 98 - 107 mmol/L    Carbon Dioxide 21 (L) 23 - 31 mmol/L    Glucose Level 180 (H) 82 - 115 mg/dL    Blood Urea Nitrogen 75.7 (H) 8.4 - 25.7 mg/dL    Creatinine 4.26 (H) 0.73 - 1.18 mg/dL    Calcium Level Total 6.6 (LL) 8.8 - 10.0 mg/dL    Protein Total 4.9 (L) 5.8 - 7.6 gm/dL    Albumin Level 1.6 (L) 3.4 - 4.8 gm/dL    Globulin 3.3 2.4 - 3.5 gm/dL    Albumin/Globulin Ratio 0.5 (L) 1.1 - 2.0 ratio    Bilirubin Total 0.8 <=1.5 mg/dL    Alkaline Phosphatase 65 40 - 150 unit/L    Alanine Aminotransferase 14 0 - 55 unit/L    Aspartate Aminotransferase 74 (H) 5 - 34 unit/L    eGFR 14 mls/min/1.73/m2   CBC with Differential    Collection Time: 11/16/22  1:31  AM   Result Value Ref Range    WBC 20.9 (H) 4.5 - 11.5 x10(3)/mcL    RBC 2.96 (L) 4.70 - 6.10 x10(6)/mcL    Hgb 8.4 (L) 14.0 - 18.0 gm/dL    Hct 25.6 (L) 42.0 - 52.0 %    MCV 86.5 80.0 - 94.0 fL    MCH 28.4 27.0 - 31.0 pg    MCHC 32.8 (L) 33.0 - 36.0 mg/dL    RDW 13.5 11.5 - 17.0 %    Platelet 259 130 - 400 x10(3)/mcL    MPV 9.5 7.4 - 10.4 fL    IG# 0.16 (H) 0 - 0.04 x10(3)/mcL    IG% 0.8 %    NRBC% 0.1 %   Manual Differential    Collection Time: 11/16/22  1:31 AM   Result Value Ref Range    Neut Man 90 %    Lymph Man 6 %    Monocyte Man 4 %    Instr WBC 20.9 x10(3)/mcL    Abs Mono 0.836 0.1 - 1.3 x10(3)/mcL    Abs Lymp 1.254 0.6 - 4.6 x10(3)/mcL    Abs Neut 18.81 (H) 2.1 - 9.2 x10(3)/mcL    RBC Morph Abnormal (A) Normal    Poik 2+ (A) (none)    Microcyte 1+ (A) (none)    [place] Cells 2+ (A) (none)    Platelet Est Normal Normal, Adequate   POCT ARTERIAL BLOOD GAS    Collection Time: 11/16/22  3:38 AM   Result Value Ref Range    POC PH 7.35     POC PCO2 43 mmHg    POC PO2 98 mmHg    POC SATURATED O2 97 %    POC Potassium 4.5 mmol/l    POC Sodium 130 (A) 137 - 145 mmol/l    POC Ionized Calcium 0.89 (A) 1.1 - 1.2 mmol/l    POC HCO3 23.7 mmol/l    Base Deficit -2.0 mmol/l    POC Temp 37.0 C    Specimen source Arterial sample    Magnesium    Collection Time: 11/16/22 10:07 AM   Result Value Ref Range    Magnesium Level 2.20 1.60 - 2.60 mg/dL   Phosphorus    Collection Time: 11/16/22 10:07 AM   Result Value Ref Range    Phosphorus Level 5.4 (H) 2.3 - 4.7 mg/dL   Basic Metabolic Panel    Collection Time: 11/16/22 10:07 AM   Result Value Ref Range    Sodium Level 135 (L) 136 - 145 mmol/L    Potassium Level 4.7 3.5 - 5.1 mmol/L    Chloride 101 98 - 107 mmol/L    Carbon Dioxide 23 23 - 31 mmol/L    Glucose Level 211 (H) 82 - 115 mg/dL    Blood Urea Nitrogen 83.1 (H) 8.4 - 25.7 mg/dL    Creatinine 4.60 (H) 0.73 - 1.18 mg/dL    BUN/Creatinine Ratio 18     Calcium Level Total 6.3 (LL) 8.8 - 10.0 mg/dL    Anion Gap 11.0 mEq/L     eGFR 12 mls/min/1.73/m2       Telemetry:  Sinus Rhythm    Physical Exam  Vitals reviewed.   Constitutional:       Appearance: He is ill-appearing.   HENT:      Head: Normocephalic.      Mouth/Throat:      Mouth: Mucous membranes are moist.      Pharynx: Oropharynx is clear.   Cardiovascular:      Rate and Rhythm: Normal rate and regular rhythm.      Comments: Sinus Rhythm  Pulmonary:      Effort: Pulmonary effort is normal. No respiratory distress.      Breath sounds: Wheezing present.      Comments: BIPAP 50% Support.  Abdominal:      General: There is no distension.      Palpations: Abdomen is soft.      Tenderness: There is no abdominal tenderness.   Musculoskeletal:      Cervical back: Neck supple.      Right lower leg: No edema.      Left lower leg: No edema.      Comments: Legs are Warm. Chronic Venous Insufficiency Skin Changes noted to bilateral lower extremities.   Neurological:      Comments: Lethargic     Current Inpatient Medications:    Current Facility-Administered Medications:     0.9%  NaCl infusion, , Intravenous, Continuous, Misha Thorne DO, Last Rate: 75 mL/hr at 11/16/22 0249, New Bag at 11/16/22 0249    acetaminophen tablet 650 mg, 650 mg, Oral, Q4H PRN, Jama Sarmiento MD, 650 mg at 11/13/22 0316    albuterol inhaler 2 puff, 2 puff, Inhalation, Q4H PRN, Jama Sarmiento MD    amiodarone 360 mg/200 mL (1.8 mg/mL) infusion, 0.5 mg/min, Intravenous, Continuous, Magdy Vanegas DO, Last Rate: 16.7 mL/hr at 11/16/22 0924, 0.5 mg/min at 11/16/22 0924    aspirin EC tablet 81 mg, 81 mg, Oral, Daily, Jama Sarmiento MD, 81 mg at 11/14/22 0859    atorvastatin tablet 40 mg, 40 mg, Oral, Daily, Jama Sarmiento MD, 40 mg at 11/14/22 0859    bisacodyL suppository 10 mg, 10 mg, Rectal, Q12H, JOSE Noland, 10 mg at 11/16/22 0822    carvediloL tablet 3.125 mg, 3.125 mg, Oral, BID, Magdy Vanegas DO    dexmedetomidine (PRECEDEX) 400mcg/100mL 0.9% NaCL infusion, 0-1.4 mcg/kg/hr, Intravenous,  Continuous, Zheng Guerra MD, Last Rate: 6.8 mL/hr at 11/16/22 1043, 0.4 mcg/kg/hr at 11/16/22 1043    enoxaparin injection 30 mg, 30 mg, Subcutaneous, Q12H, Cheo Garcia MD    EScitalopram oxalate tablet 5 mg, 5 mg, Oral, Daily, Lore Hernández MD, 5 mg at 11/14/22 0859    HYDROcodone-acetaminophen  mg per tablet 1 tablet, 1 tablet, Oral, Q4H PRN, Jama Sarmiento MD, 1 tablet at 11/13/22 1620    hydrocortisone sodium succinate injection 100 mg, 100 mg, Intravenous, Q8H, Cheo Garcia MD, 100 mg at 11/16/22 0549    lacosamide (VIMPAT) 100 mg in sodium chloride 0.9% 100 mL IVPB, 100 mg, Intravenous, Q12H, Magdy Vanegas DO, Stopped at 11/16/22 0619    lamoTRIgine tablet 200 mg, 200 mg, Oral, BID, Jama Sarmiento MD, 200 mg at 11/15/22 2008    mupirocin 2 % ointment, , Nasal, BID, Moreno Ceja MD, Given at 11/16/22 0822    NORepinephrine 32 mg in dextrose 5 % 250 mL infusion, 0-3 mcg/kg/min, Intravenous, Continuous, Zheng Guerra MD, Last Rate: 8.9 mL/hr at 11/16/22 0524, 0.28 mcg/kg/min at 11/16/22 0524    oxacillin 2 g in sodium chloride 0.9 % 100 mL IVPB (MB+), 2 g, Intravenous, Q4H, JOSE Noland, Stopped at 11/16/22 0852    pantoprazole injection 40 mg, 40 mg, Intravenous, Daily, Magdy Vanegas DO, 40 mg at 11/16/22 0822    Flushing PICC Protocol, , , Until Discontinued **AND** sodium chloride 0.9% flush 10 mL, 10 mL, Intravenous, Q6H, 10 mL at 11/16/22 0600 **AND** sodium chloride 0.9% flush 10 mL, 10 mL, Intravenous, PRN, Zheng Guerra MD    vasopressin (PITRESSIN) 0.2 Units/mL in dextrose 5 % 100 mL infusion, 0.04 Units/min, Intravenous, Continuous, Zheng Guerra MD, Last Rate: 12 mL/hr at 11/16/22 0822, 0.04 Units/min at 11/16/22 0822    zinc oxide-cod liver oil 40 % paste, , Topical (Top), BID, Moreno Ceja MD, Given at 11/16/22 0822    VTE Risk Mitigation (From admission, onward)           Ordered     enoxaparin injection 30 mg  Every 12 hours (non-standard  times)         11/16/22 1022                  Assessment:   NSTEMI- Suspect Type II in the Setting of Septic Shock    - Recent Atypical Chest Pain at Pacer Site (Evaluated on 11.13.22 on Initial Consultation)  Septic Shock Requiring Vasopressor Support    - MSSA Bacteremia  CAD (By Calcium Score, Normal Coronaries in 2017)    - EF 55%  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  Hold all Antihypertensive Agents for Now Re: Hypotension Requiring Vasopressor Support  Recommend VQ Scan when Clinically Stable Re: Rule out Pulmonary Embolism in the Setting of RV Strain  Unable to Perform SHANIQUA at this time given BIPAP Support and History of Esophageal Stricture.  Antibiotic Management as per Primary Team  Discontinue Amiodarone. Tele Reviewed, do not suspect NSVT.. Paced Rhythm versus Artifact.  Prognosis is Guarded    JOSE Chong  Cardiology  [facility] - [facility] North ICU  11/16/2022

## 2022-11-16 NOTE — NURSING
WOCN [id number] y/o male in with closed fx 4th cervical.   IN ICU and prognosis poor.  See post from nephrology and cardiology.   Family talking with mds on status.     Family in with pt at this time.

## 2022-11-16 NOTE — PT/OT/SLP PROGRESS
SLP re-consult orders received, chart reviewed, and nursing consulted.  Pt currently minimally responsive and requiring BiPAP for respiratory support.  Attempts at PO intake are unsafe at this time.  Nursing to alert SLP if status improves throughout the day.  Will continue to follow and tx as appropriate.

## 2022-11-17 NOTE — PT/OT/SLP PROGRESS
Occupational Therapy      Patient Name:  Tim Meneses   MRN:  [id number]    Pt cont to be  medically unstable; OT to sign off as therapy services are not appropriate at this time. Please reconsult if pt improves and becomes ready to mobilize.    11/17/2022

## 2022-11-17 NOTE — PT/OT/SLP PROGRESS
Followed up regarding clinical swallow evaluation.  Mental and respiratory status continue to fluctuate and attempts at PO intake remain unsafe.  Will continue to follow and tx as appropriate.   October 11, 2018      Jesus George  [address] [address]        Dear ,    We are writing to inform you of your test results.    I am writing to share your lab results from your recent visit at our office. The PSA and TSH are both in a good range. Please find the results below.  If you have any questions regarding these test results let me know.    Resulted Orders   PSA, screen   Result Value Ref Range    PSA 2.18 0 - 4 ug/L      Comment:      Assay Method:  Chemiluminescence using Siemens Vista analyzer   TSH with free T4 reflex   Result Value Ref Range    TSH 2.08 0.40 - 4.00 mU/L       If you have any questions or concerns, please call the clinic at the number listed above.       Sincerely,        Randall Lo MD/nr

## 2022-11-17 NOTE — PROGRESS NOTES
"[facility] - [facility] ICU  Pulmonary Critical Care Note    Patient Name: Tim Meneses  MRN: [id number]  Admission Date: 11/7/2022  Hospital Length of Stay: 10 days  Code Status: DNR  Attending Provider: Cheo Garcia MD  Primary Care Provider: Primary Doctor No     Subjective:     HPI:   This is a 77-year-old male with history of CVA, seizure disorder on lamotrigine and Vimpat, recurrent bradycardia status post pacemaker placement and hepatitis who was upgraded to the ICU after a RRT on the floor for altered mental status and hypotension.  The patient was originally admitted on 11/07 status post multiple falls at home with right-sided drift, dizziness and weakness.  Both Neurology and Neurosurgery were consulted on admission.  MRI brain and cervical spine were done.  MRI brain showed no acute changes but the C-spine imaging showed a C4 fracture; NS was consulted recommend rigid collar then transition to a soft Collar for comfort.  Neurology initially started the patient on Sinemet and a complete neuro workup was negative.  Blood cultures were then positive 2/2 bottles for Gram-positive cocci probable staph; final with MSSA; sensitive to oxacillin.  Infectious Disease was consulted/following.  Upon admit to the ICU he has been requiring vasopressors. Renal was also consulted s/t worsening renal indices with history of CKD. The patient is a DNR.     GI also was consulted s/t dysphagia and vomiting.     Hospital Course/Significant events:  11/15 transferred to ICU after episode of hypotension, started on Levophed and placed on BiPAP  11/17/2022- per renal "RRT seems to be imminent. Will need to determine goals of care with family"  EGD on 11/11- dilated the patient for his dysphagia concerns.  He may house an occult stricture at the squamocolumnar junction, but his esophagoscopy was otherwise reassuring as was his cardia and fundus.  Noted to have antral gastritis, and fairly significant erosive " bulbar duodenitis. S/p antral biopsy to rule out Helicobacter, and s/p dilatation of his esophagus to 54 French.    24 Hour Interval History:  Currently on oximask. Discussed case with SLP and she informed me this Am that the daughter told her is has had a 100 LBS weight loss over the last year. DNR status noted.     Past Medical History:   Diagnosis Date    COPD (chronic obstructive pulmonary disease)     Hepatitis     Hypercholesterolemia     Hypertension     Malnutrition related to chronic disease     Seizure     Subacromial impingement of right shoulder     Ventricular tachycardia      Past Surgical History:   Procedure Laterality Date    APPENDECTOMY      CARDIAC PACEMAKER PLACEMENT       Social History     Socioeconomic History    Marital status:    Tobacco Use    Smoking status: Every Day     Packs/day: 1.00     Types: Cigarettes     Start date: 1961    Smokeless tobacco: Never   Substance and Sexual Activity    Alcohol use: Yes     Comment: 1-2 times per year    Drug use: Never    Sexual activity: Not Currently     Current Outpatient Medications   Medication Instructions    albuterol (PROVENTIL/VENTOLIN HFA) 90 mcg/actuation inhaler 2 puffs, Inhalation, Every 4 hours PRN    aspirin (ECOTRIN) 81 mg, Oral, Daily    atorvastatin (LIPITOR) 40 MG tablet 1 tablet, Oral, Daily    carvediloL (COREG) 25 MG tablet 1 tablet, Oral, 2 times daily    cetirizine (ZYRTEC) 10 mg, Oral, Daily    cloNIDine (CATAPRES) 0.1 MG tablet 1 tablet, Oral, Every 8 hours PRN, Take 1 tablet orally every 8hrs as needed for SBP greater than 175 or DBP greater than 100    EScitalopram oxalate (LEXAPRO) 5 MG Tab 1 tablet, Oral, Daily    fluticasone propionate (FLONASE) 50 mcg/actuation nasal spray 1 spray, Each Nostril, Daily    furosemide (LASIX) 40 MG tablet 1 tablet, Oral, Daily    lacosamide (VIMPAT) 100 mg, Oral, Every 12 hours    lamoTRIgine (LAMICTAL) 200 MG tablet 1 po q am; 1.5 po qhs    NIFEdipine (PROCARDIA-XL) 60 mg, Oral,  Daily   Current Inpatient Medications   aspirin  81 mg Oral Daily    atorvastatin  40 mg Oral Daily    bisacodyL  10 mg Rectal Q12H    enoxaparin  30 mg Subcutaneous Q12H    EScitalopram oxalate  5 mg Oral Daily    hydrocortisone sodium succinate  100 mg Intravenous Q8H    lacosamide (VIMPAT) IVPB  100 mg Intravenous Q12H    lamoTRIgine  200 mg Oral BID    mupirocin   Nasal BID    oxacillin IVPB  2 g Intravenous Q4H    pantoprazole  40 mg Intravenous Daily    sodium chloride 0.9%  10 mL Intravenous Q6H    zinc oxide-cod liver oil   Topical (Top) BID   Current Intravenous Infusions   sodium chloride 0.9% 75 mL/hr at 11/16/22 2036    amiodarone in dextrose 5% 0.5 mg/min (11/16/22 0924)    dexmedetomidine (PRECEDEX) infusion 0.4 mcg/kg/hr (11/16/22 1043)    NORepinephrine bitartrate-D5W 0.14 mcg/kg/min (11/17/22 0853)    vasopressin 0.04 Units/min (11/17/22 0545)     Review of Systems   Unable to perform ROS: Critical illness      Objective:       Intake/Output Summary (Last 24 hours) at 11/17/2022 0908  Last data filed at 11/17/2022 0600  Gross per 24 hour   Intake 2493 ml   Output 816 ml   Net 1677 ml       Vital Signs (Most Recent):  Temp: 97.5 °F (36.4 °C) (11/17/22 0400)  Pulse: 75 (11/17/22 0630)  Resp: (!) 22 (11/17/22 0630)  BP: 104/67 (11/17/22 0630)  SpO2: 100 % (11/17/22 0630)    Body mass index is 24.96 kg/m².  Weight: 68 kg (150 lb) Vital Signs (24h Range):  Temp:  [97.5 °F (36.4 °C)-98 °F (36.7 °C)] 97.5 °F (36.4 °C)  Pulse:  [60-77] 75  Resp:  [10-29] 22  SpO2:  [75 %-100 %] 100 %  BP: ()/() 104/67  Arterial Line BP: ()/(39-63) 115/47     Physical Exam  Vitals and nursing note reviewed.   Constitutional:       General: He is not in acute distress.     Comments: Patient is arousable but lethargic appearing, oxymask in place  HENT:      Nose: Nose normal. No congestion or rhinorrhea.   Eyes:      Extraocular Movements: Extraocular movements intact.      Pupils: Pupils are equal, round,  and reactive to light.   Cardiovascular:      Rate and Rhythm: Normal rate and regular rhythm.      Pulses: Normal pulses.      Heart sounds: + murmur heard across pericardium   Pulmonary:      Effort: Pulmonary effort is normal. No respiratory distress.      Breath sounds:  Rhonchi present throughout all lobes  Abdominal:      General: Abdomen is flat. There is no distension.      Palpations: Abdomen is soft.      Tenderness: There is no abdominal tenderness.   Skin:     General: Skin is warm and dry.   Neurological:      Mental Status: He is arousable       Comments: Patient is arousable but lethargic.  Still has spontaneous/purposeful movements, nodded head to questions   Lines/Drains/Airways       Peripherally Inserted Central Catheter Line  Duration             PICC Triple Lumen 11/15/22 0018 right basilic 2 days              Drain  Duration                  Urethral Catheter 11/14/22 1745 16 Fr. 2 days              Arterial Line  Duration             Arterial Line 11/14/22 2230 Right Radial 2 days              Peripheral Intravenous Line  Duration                  Peripheral IV - Single Lumen 11/14/22 1744 Anterior;Left Forearm 2 days         Peripheral IV - Single Lumen 11/14/22 1744 Left Antecubital 2 days         Peripheral IV - Single Lumen 11/14/22 1745 Anterior;Proximal;Right Forearm 2 days                Significant Labs:  Lab Results   Component Value Date    WBC 21.5 (H) 11/17/2022    HGB 7.8 (L) 11/17/2022    HCT 23.6 (L) 11/17/2022    MCV 84.9 11/17/2022     11/17/2022   BMP  Lab Results   Component Value Date     11/17/2022    K 5.1 11/17/2022    CO2 20 (L) 11/17/2022    BUN 87.1 (H) 11/17/2022    CREATININE 5.06 (H) 11/17/2022    CALCIUM 6.5 (LL) 11/17/2022   ABG  Recent Labs   Lab 11/16/22  0338   PH 7.35   PO2 98   PCO2 43   HCO3 23.7     Mechanical Ventilation Support:  Oxygen Concentration (%): 40 (11/16/22 2215)    Significant Imaging:  No imaging over the past   Assessment/Plan:      Assessment  Shock, likely septic  MSSA bacteremia  Acute Respiratory Failure requiring Non re-breather  Right ventricular systolic dysfunction with severe right ventricular enlargement (echocardiogram on 11/14/2022)  Esophageal stricture s/p esophageal dilation   Anxiety  Previous CVA  Seizure Disorder  GIUSEPPE on CKD  Sick-Sinus syndrome s/p pacemaker placement  COPD    Plan  -Continue supportive care, will need to have further discussions with family on goals of care and future plan of care. Per renal's note, RRT seems to be imminent but he was unsure of the direction of care that family wishes to pursue, etc.   -still requiring significant vasopressor support with levophed and vasopressin, continuing to wean as tolerated; goal is to keep a MAP of 65 or greater.   --remains on oxacillin,ID managing.   -Vimpat and Lamictal for seizure prophylaxis  -SLP working with patient   - overall poor prognosis     Emily Gomes, ANP  Pulmonary Critical Care Medicine  [facility] - [facility] ICU

## 2022-11-17 NOTE — PROGRESS NOTES
NEPHROLOGY PROGRESS NOTE  [facility]     Patient Seen Date: 11/17/2022  Patient Seen Time: 7:17 AM     Reason for consult: GIUSEPPE on ?CKD Stage IV       HPI: 77 year-old male with COPD, HTN, HLD, and apparent chronic kidney disease without clear etiology, presented to the hospital with complaints of dizziness. The pt had multiple falls at home, and an additional fall here during his hospital stay. He was found to have a severely decreased BP and was transferred to the ICU for further care on 11/14/22. Scr on admission (11/7/22) was increased to 2.71, however was found to decreased to 1.32 on 11/10/22, and has since increased to 4.23 today. The patient has decreased urine output. He has developed MSSA bacteremia without clear source. Nephrology service is consulted for GIUSEPPE on ?CKD.     Interval History: Pt seen and examined at bedside in ICU setting. Pt is moderately confused after being awoken from sleep. Per nursing, UOP has decreased over the past 24 hours. Scr is increasing.     Review of Systems:  Unable to obtain 2/2 pt condition.        Current Facility-Administered Medications:     0.9%  NaCl infusion, , Intravenous, Continuous, Misha Thorne DO, Last Rate: 75 mL/hr at 11/16/22 2036, New Bag at 11/16/22 2036    acetaminophen tablet 650 mg, 650 mg, Oral, Q4H PRN, Jama Sarmiento MD, 650 mg at 11/13/22 0316    albuterol inhaler 2 puff, 2 puff, Inhalation, Q4H PRN, Jama Sarmiento MD    amiodarone 360 mg/200 mL (1.8 mg/mL) infusion, 0.5 mg/min, Intravenous, Continuous, Magdy Vanegas DO, Last Rate: 16.7 mL/hr at 11/16/22 0924, 0.5 mg/min at 11/16/22 0924    aspirin EC tablet 81 mg, 81 mg, Oral, Daily, Jama Sarmiento MD, 81 mg at 11/14/22 0859    atorvastatin tablet 40 mg, 40 mg, Oral, Daily, Jama Sarmiento MD, 40 mg at 11/14/22 0859    bisacodyL suppository 10 mg, 10 mg, Rectal, Q12H, JOSE Noland, 10 mg at 11/16/22 2037    dexmedetomidine (PRECEDEX) 400mcg/100mL 0.9% NaCL  infusion, 0-1.4 mcg/kg/hr, Intravenous, Continuous, Zheng Guerra MD, Last Rate: 6.8 mL/hr at 11/16/22 1043, 0.4 mcg/kg/hr at 11/16/22 1043    enoxaparin injection 30 mg, 30 mg, Subcutaneous, Q12H, Cheo Garcia MD, 30 mg at 11/17/22 0357    EScitalopram oxalate tablet 5 mg, 5 mg, Oral, Daily, Lore Hernández MD, 5 mg at 11/14/22 0859    HYDROcodone-acetaminophen  mg per tablet 1 tablet, 1 tablet, Oral, Q4H PRN, Jama Sarmiento MD, 1 tablet at 11/13/22 1620    hydrocortisone sodium succinate injection 100 mg, 100 mg, Intravenous, Q8H, Cheo Garcia MD, 100 mg at 11/17/22 0611    lacosamide (VIMPAT) 100 mg in sodium chloride 0.9% 100 mL IVPB, 100 mg, Intravenous, Q12H, Magdy Vanegas DO, Stopped at 11/17/22 0527    lamoTRIgine tablet 200 mg, 200 mg, Oral, BID, Jama Sarmiento MD, 200 mg at 11/15/22 2008    mupirocin 2 % ointment, , Nasal, BID, Moreno Ceja MD, Given at 11/16/22 2042    NORepinephrine 32 mg in dextrose 5 % 250 mL infusion, 0-3 mcg/kg/min, Intravenous, Continuous, Zheng Guerra MD, Last Rate: 4.5 mL/hr at 11/17/22 0530, 0.14 mcg/kg/min at 11/17/22 0530    oxacillin 2 g in sodium chloride 0.9 % 100 mL IVPB (MB+), 2 g, Intravenous, Q4H, JOSE Noland, Stopped at 11/17/22 0427    pantoprazole injection 40 mg, 40 mg, Intravenous, Daily, Magdy Vanegas DO, 40 mg at 11/16/22 0822    Flushing PICC Protocol, , , Until Discontinued **AND** sodium chloride 0.9% flush 10 mL, 10 mL, Intravenous, Q6H, 10 mL at 11/17/22 0612 **AND** sodium chloride 0.9% flush 10 mL, 10 mL, Intravenous, PRN, Zheng Guerra MD    vasopressin (PITRESSIN) 0.2 Units/mL in dextrose 5 % 100 mL infusion, 0.04 Units/min, Intravenous, Continuous, Zheng Guerra MD, Last Rate: 12 mL/hr at 11/17/22 0545, 0.04 Units/min at 11/17/22 0545    zinc oxide-cod liver oil 40 % paste, , Topical (Top), BID, Moreno Ceja MD, Given at 11/16/22 2042    Vital Signs (24 h):  Temp:  [97.4 °F (36.3 °C)-98 °F (36.7  °C)] 97.5 °F (36.4 °C)  Pulse:  [60-77] 75  Resp:  [10-29] 22  SpO2:  [75 %-100 %] 100 %  BP: ()/() 104/67  Arterial Line BP: ()/(39-63) 115/47   I/O last 3 completed shifts:  In: 4024 [I.V.:3649; IV Piggyback:375]  Out: 1336 [Urine:1335; Stool:1]  No intake/output data recorded.        Physical Exam:  General: NAD, ill-appearance   HEENT: + facemask O2  CVS: S1/S2 present and normal. No murmur/rubs/gallop.      RS: decreased bibasilar breath sounds  Abdominal: Soft, NT/ND  Extremities: No edema b/l LE  Skin: No rash, no lesions.  Neurological: No focal deficits.  Psych: Normal affect  Dialysis Access: None    Results:    Lab Results   Component Value Date     11/17/2022    K 5.1 11/17/2022    CO2 20 (L) 11/17/2022    BUN 87.1 (H) 11/17/2022    CREATININE 5.06 (H) 11/17/2022    CALCIUM 6.5 (LL) 11/17/2022    PHOS 5.4 (H) 11/16/2022    WBC 21.5 (H) 11/17/2022    HGB 7.8 (L) 11/17/2022    HCT 23.6 (L) 11/17/2022     11/17/2022       Assessment and Plan:      GIUSEPPE on ?CKD Stage Unknown (likely 3-4).  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 Pt with known kidney disease in the past, however has not been evaluated by a nephrologist as an outpatient. Upon lab review of Epic, Scr was 1.99 on 11/20/21. Scr on admission (11/7/22) was increased to 2.71, however was found to decreased to 1.32 on 11/10/22, and has since increased to 5.06 today. UA with 1+ protein and trace ketones, otherwise with inactive sediment. No renal ultrasound completed during this hospitalization. Pt is oliguric.  - FENa is > 5%, suggesting likely ATN. Spot urine TP/CR is elevated at 1.4, however in non-nephrotic range.  - Continue BP support. Continue IVFs. Monitor for signs of fluid overload.  - Hepatitis and HIV are negative. C3 and C4 are not low. BHARATI and ANCA are negative.  - Discussed role of RRT with daughter at bedside. She will discuss this with family. We will  monitor his response to conservative therapy before consideration of RRT. At current there are no clear indications for initiation of RRT, however RRT seems to be imminent. Will need to determine goals of care with family.   - Monitor labs. Avoid potential nephrotoxins.    Thank you for your consult. Please feel free to reach me with any questions.  Plan for follow-up tomorrow.    Misha Thorne DO  Interventional Nephrology  [facility]  Cell: [phone]  Office: [phone]

## 2022-11-17 NOTE — PROGRESS NOTES
[facility] - [facility]  Cardiology  Progress Note    Patient Name: Tim Meneses  MRN: [id number]  Admission Date: 11/7/2022  Hospital Length of Stay: 10 days  Code Status: DNR   Attending Physician: Cheo Garcia MD   Primary Care Physician: Primary Doctor No  Expected Discharge Date:   Principal Problem:<principal problem not specified>    Subjective:   Consultation Reason: Initial- Chest Pain at [facility], Re-consult- NSTEMI    HPI:   Mr. Meneses is a 77 year old male, known to Dr. Maria/Dr. Wheeler, who presented to the hospital with altered mental status and hypotension. He was originally admitted with frequent falls. MRI Brain was performed due to stroke-like symptoms, but was negative/no acute changes. C-Spine imaging revealed C4 fracture. He was noted to have positive blood cultures. ID adjusting antibiotics. He was noted to be hypotensive (11.15.22) which required RRT and fluid administration which helped the hypotension. Troponin values noted to be elevated with peak value of 5.3 with trend down. He does have chronic kidney disease with elevated creatinine. Echocardiogram on 11.14.22 revealed intact LV Function with positive Madden's Sign. [facility] is consulted for cardiac evaluation concerning the elevated troponin.  Of note, we were initially consulted on 11.13.22 for chest pain at [facility] evaluation.    Hospital Course:   11.15.22: Re-consult in the setting of elevated troponin. Hypotensive on Vasopressor Support. SR on Tele. BIPAP in Place.   11.16.22: Patient is resting, but minimally response. On BIPAP. Remains on Pressor support. Started on Amiodarone overnight for what appears to be a paced rhythm, concern was for VT. He is anemic. Patient's family is at bedside.   11.17.22: NAD Noted. Patient awake on OXY Mask oxygen support. BP Stable but also requiring vasopressor support. SR on Tele.     PMH: CAD (By CACS. Normal Coronaries in 2017), CACS (1685/90th Percentile 9.11.19),  CVD- BICA 1-39%, Hypertension, Hyperlipidemia, CKD, SSS/PPM, NSVT (Negative EP Study), Seizure, Chronic Tobacco Use, Frequent Falls, Seizures  PSH: Pacemaker ([facility]), Angiogram,, EP Study, Tonsillectomy, Appendectomy  Family History: No Significant Family History is Noted  Social History: Tobacco- Active Smoker, Alcohol- Negative, Substance Abuse- Negative    Previous Cardiac Diagnostics:   Echocardiogram (11.14.22):  The left ventricle is normal in size with concentric remodeling and normal systolic function. The estimated ejection fraction is 55%.  Severe right ventricular enlargement with moderately reduced right ventricular systolic function. Madden's sign is present. Please correlate clinically for the presence of an acute pulmonary embolism.  Madden sign is present.  Please correlate clinically for the presence of an acute pulmonary embolism.    Carotid US (2.22.21):  The study quality is average.   1-39% stenosis in the proximal right internal carotid artery based on Bluth Criteria.   1-39% stenosis in the proximal left internal carotid artery based on Bluth Criteria.   Antegrade right vertebral artery flow.   Antegrade left vertebral artery flow.     EP Study (12.12.17):  Sinus Node Dysfunction. Abnormal Supra-hisian AV Radha Disease, No Inducible Sustained Arrhythmia.    Coronary Angiogram (12.11.17):  Normal Coronaries    Review of Systems   Cardiovascular:  Negative for chest pain.   Respiratory:  Positive for wheezing. Negative for shortness of breath.      Objective:     Vital Signs (Most Recent):  Temp: 97.5 °F (36.4 °C) (11/17/22 0400)  Pulse: 65 (11/17/22 0900)  Resp: (!) 23 (11/17/22 0900)  BP: 104/67 (11/17/22 0630)  SpO2: 98 % (11/17/22 0900)   Vital Signs (24h Range):  Temp:  [97.5 °F (36.4 °C)-98 °F (36.7 °C)] 97.5 °F (36.4 °C)  Pulse:  [60-77] 65  Resp:  [10-29] 23  SpO2:  [75 %-100 %] 98 %  BP: ()/() 104/67  Arterial Line BP: ()/(39-63) 115/47     Weight:  68 kg (150 lb)  Body mass index is 24.96 kg/m².    SpO2: 98 %  O2 Device (Oxygen Therapy): Oxymask      Intake/Output Summary (Last 24 hours) at 11/17/2022 1217  Last data filed at 11/17/2022 0600  Gross per 24 hour   Intake 2493 ml   Output 591 ml   Net 1902 ml         Lines/Drains/Airways       Peripherally Inserted Central Catheter Line  Duration             PICC Triple Lumen 11/15/22 0018 right basilic 2 days              Drain  Duration                  Urethral Catheter 11/14/22 1745 16 Fr. 2 days              Arterial Line  Duration             Arterial Line 11/14/22 2230 Right Radial 2 days              Peripheral Intravenous Line  Duration                  Peripheral IV - Single Lumen 11/14/22 1744 Anterior;Left Forearm 2 days         Peripheral IV - Single Lumen 11/14/22 1744 Left Antecubital 2 days         Peripheral IV - Single Lumen 11/14/22 1745 Anterior;Proximal;Right Forearm 2 days                    Significant Labs:   Recent Results (from the past 72 hour(s))   POCT glucose    Collection Time: 11/14/22  3:59 PM   Result Value Ref Range    POCT Glucose 162 (H) 70 - 110 mg/dL   Comprehensive Metabolic Panel    Collection Time: 11/14/22  7:42 PM   Result Value Ref Range    Sodium Level 134 (L) 136 - 145 mmol/L    Potassium Level 4.7 3.5 - 5.1 mmol/L    Chloride 98 98 - 107 mmol/L    Carbon Dioxide 24 23 - 31 mmol/L    Glucose Level 202 (H) 82 - 115 mg/dL    Blood Urea Nitrogen 62.7 (H) 8.4 - 25.7 mg/dL    Creatinine 3.49 (H) 0.73 - 1.18 mg/dL    Calcium Level Total 7.7 (L) 8.8 - 10.0 mg/dL    Protein Total 4.9 (L) 5.8 - 7.6 gm/dL    Albumin Level 1.8 (L) 3.4 - 4.8 gm/dL    Globulin 3.1 2.4 - 3.5 gm/dL    Albumin/Globulin Ratio 0.6 (L) 1.1 - 2.0 ratio    Bilirubin Total 0.9 <=1.5 mg/dL    Alkaline Phosphatase 67 40 - 150 unit/L    Alanine Aminotransferase 9 0 - 55 unit/L    Aspartate Aminotransferase 76 (H) 5 - 34 unit/L    eGFR 17 mls/min/1.73/m2   Magnesium    Collection Time: 11/14/22  7:42 PM    Result Value Ref Range    Magnesium Level 2.00 1.60 - 2.60 mg/dL   Troponin I    Collection Time: 11/14/22  7:42 PM   Result Value Ref Range    Troponin-I 2.583 (H) 0.000 - 0.045 ng/mL   Lactic Acid, Plasma    Collection Time: 11/14/22  7:42 PM   Result Value Ref Range    Lactic Acid Level 1.9 0.5 - 2.2 mmol/L   CBC with Differential    Collection Time: 11/14/22  7:42 PM   Result Value Ref Range    WBC 17.1 (H) 4.5 - 11.5 x10(3)/mcL    RBC 3.95 (L) 4.70 - 6.10 x10(6)/mcL    Hgb 11.0 (L) 14.0 - 18.0 gm/dL    Hct 34.8 (L) 42.0 - 52.0 %    MCV 88.1 80.0 - 94.0 fL    MCH 27.8 27.0 - 31.0 pg    MCHC 31.6 (L) 33.0 - 36.0 mg/dL    RDW 13.5 11.5 - 17.0 %    Platelet 288 130 - 400 x10(3)/mcL    MPV 9.4 7.4 - 10.4 fL    Neut % 84.7 %    Lymph % 6.5 %    Mono % 7.1 %    Eos % 0.4 %    Basophil % 0.5 %    Lymph # 1.11 0.6 - 4.6 x10(3)/mcL    Neut # 14.5 (H) 2.1 - 9.2 x10(3)/mcL    Mono # 1.21 0.1 - 1.3 x10(3)/mcL    Eos # 0.06 0 - 0.9 x10(3)/mcL    Baso # 0.09 0 - 0.2 x10(3)/mcL    IG# 0.14 (H) 0 - 0.04 x10(3)/mcL    IG% 0.8 %    NRBC% 0.0 %   POCT ARTERIAL BLOOD GAS    Collection Time: 11/14/22  8:42 PM   Result Value Ref Range    POC PH 7.32 (A) 7.35 - 7.45    POC PCO2 54 (AA) 19 - 50 mmHg    POC PO2 82 80 - 100 mmHg    POC HEMOGLOBIN 10.8 (A) 12.0 - 16.0 g/dL    POC SATURATED O2 95.0 %    POC O2Hb 94.7 94.0 - 97.0 %    POC COHb 1.8 %    POC MetHb 1.3 0.40 - 1.5 %    POC Potassium 4.3 3.5 - 5.0 mmol/l    POC Sodium 129 (A) 137 - 145 mmol/l    POC Ionized Calcium 0.97 (A) 1.1 - 1.2 mmol/l    Correct Temperature (PH) 7.32 (A) 7.35 - 7.45    Corrected Temperature (pCO2) 54 (AA) 19 - 50 mmHg    Corrected Temperature (pO2) 82 80 - 100 mmHg    POC HCO3 27.8 (A) 22.0 - 26.0 mmol/l    Base Deficit 1.0 -2.0 - 2.0 mmol/l    POC Temp 37.0 °C    Specimen source Arterial sample    Troponin I    Collection Time: 11/15/22 12:29 AM   Result Value Ref Range    Troponin-I 5.388 (H) 0.000 - 0.045 ng/mL   Vancomycin, Random    Collection Time:  11/15/22 12:29 AM   Result Value Ref Range    Vanc Lvl Random 13.9 (L) 15.0 - 20.0 ug/ml   HIV 1/2 Ag/Ab (4th Gen)    Collection Time: 11/15/22  1:50 AM   Result Value Ref Range    HIV Nonreactive Nonreactive   Hepatitis B Core Antibody, Total    Collection Time: 11/15/22  1:50 AM   Result Value Ref Range    Hepatitis B Core Antibody Nonreactive Nonreactive   SYPHILIS ANTIBODY (WITH REFLEX RPR)    Collection Time: 11/15/22  1:50 AM   Result Value Ref Range    Syphilis Antibody Nonreactive Nonreactive, Equivocal   Hepatitis B Core Antibody, IgM    Collection Time: 11/15/22  1:50 AM   Result Value Ref Range    Hepatitis B Core IgM Nonreactive Nonreactive   Hepatitis B Surface Ab, Qualitative    Collection Time: 11/15/22  1:50 AM   Result Value Ref Range    Hepatitis B Surface Antibody Nonreactive Nonreactive    Hepatitis B Surface Antibody Qnt 0.00 mIU/mL   Hepatitis B Surface Antigen    Collection Time: 11/15/22  1:50 AM   Result Value Ref Range    Hepatitis B Surface Antigen Nonreactive Nonreactive   Hepatitis C Antibody    Collection Time: 11/15/22  1:50 AM   Result Value Ref Range    Hepatitis C Antibody Nonreactive Nonreactive   Troponin I    Collection Time: 11/15/22  1:50 AM   Result Value Ref Range    Troponin-I 5.381 (H) 0.000 - 0.045 ng/mL   TISSUE TRANSGLUTAMINASE IGA ANTIBODY    Collection Time: 11/15/22  1:50 AM   Result Value Ref Range    Karine Maryy IgA (tTG IgA) Negative Negative   Comprehensive Metabolic Panel    Collection Time: 11/15/22  2:59 AM   Result Value Ref Range    Sodium Level 132 (L) 136 - 145 mmol/L    Potassium Level 4.5 3.5 - 5.1 mmol/L    Chloride 96 (L) 98 - 107 mmol/L    Carbon Dioxide 25 23 - 31 mmol/L    Glucose Level 253 (H) 82 - 115 mg/dL    Blood Urea Nitrogen 68.0 (H) 8.4 - 25.7 mg/dL    Creatinine 4.23 (H) 0.73 - 1.18 mg/dL    Calcium Level Total 7.3 (L) 8.8 - 10.0 mg/dL    Protein Total 5.1 (L) 5.8 - 7.6 gm/dL    Albumin Level 1.9 (L) 3.4 - 4.8 gm/dL    Globulin 3.2 2.4 -  3.5 gm/dL    Albumin/Globulin Ratio 0.6 (L) 1.1 - 2.0 ratio    Bilirubin Total 0.8 <=1.5 mg/dL    Alkaline Phosphatase 67 40 - 150 unit/L    Alanine Aminotransferase 9 0 - 55 unit/L    Aspartate Aminotransferase 85 (H) 5 - 34 unit/L    eGFR 14 mls/min/1.73/m2   Magnesium    Collection Time: 11/15/22  2:59 AM   Result Value Ref Range    Magnesium Level 1.90 1.60 - 2.60 mg/dL   Phosphorus    Collection Time: 11/15/22  2:59 AM   Result Value Ref Range    Phosphorus Level 6.3 (H) 2.3 - 4.7 mg/dL   CBC with Differential    Collection Time: 11/15/22  2:59 AM   Result Value Ref Range    WBC 18.9 (H) 4.5 - 11.5 x10(3)/mcL    RBC 3.77 (L) 4.70 - 6.10 x10(6)/mcL    Hgb 10.4 (L) 14.0 - 18.0 gm/dL    Hct 32.8 (L) 42.0 - 52.0 %    MCV 87.0 80.0 - 94.0 fL    MCH 27.6 27.0 - 31.0 pg    MCHC 31.7 (L) 33.0 - 36.0 mg/dL    RDW 13.4 11.5 - 17.0 %    Platelet 312 130 - 400 x10(3)/mcL    MPV 9.3 7.4 - 10.4 fL    IG# 0.23 (H) 0 - 0.04 x10(3)/mcL    IG% 1.2 %    NRBC% 0.0 %   Manual Differential    Collection Time: 11/15/22  2:59 AM   Result Value Ref Range    Neut Man 93 %    Lymph Man 4 %    Monocyte Man 3 %    Instr WBC 18.9 x10(3)/mcL    Abs Mono 0.567 0.1 - 1.3 x10(3)/mcL    Abs Lymp 0.756 0.6 - 4.6 x10(3)/mcL    Abs Neut 17.577 (H) 2.1 - 9.2 x10(3)/mcL    RBC Morph Abnormal (A) Normal    Anisocyte 1+ (A) (none)    Poik 2+ (A) (none)    Microcyte 1+ (A) (none)    Hardwick Cells 2+ (A) (none)    Platelet Est Adequate Normal, Adequate   Troponin I    Collection Time: 11/15/22  6:55 AM   Result Value Ref Range    Troponin-I 3.921 (H) 0.000 - 0.045 ng/mL   C3 Complement    Collection Time: 11/15/22  6:55 AM   Result Value Ref Range    C3 Complement 94 80 - 173 mg/dL   C4 Complement    Collection Time: 11/15/22  6:55 AM   Result Value Ref Range    C4 Complement 21.0 13.0 - 46.0 mg/dL   POCT ARTERIAL BLOOD GAS    Collection Time: 11/15/22  7:28 AM   Result Value Ref Range    POC PH 7.35     POC PCO2 49 (A) mmHg    POC PO2 92 mmHg    POC  SATURATED O2 97 %    POC Potassium 4.6 mmol/l    POC Sodium 128 (A) 137 - 145 mmol/l    POC Ionized Calcium 0.87 (A) 1.1 - 1.2 mmol/l    POC HCO3 27.1 mmol/l    Base Deficit 0.80 mmol/l    POC Temp 37.0 C    Specimen source Arterial sample    Anti-Neutrophilic Cytoplasmic Antibody    Collection Time: 11/15/22  2:17 PM   Result Value Ref Range    c-ANCA Negative Negative    p-ANCA Negative Negative   BHARATI IgG by IFA    Collection Time: 11/15/22  2:17 PM   Result Value Ref Range    Antinuclear Ab, HEp-2 Substrate <1:80 (Negative) <1:80 (Negative)   Sodium, Random Urine    Collection Time: 11/15/22  2:53 PM   Result Value Ref Range    Urine Sodium 70.0 mmol/L   Protein/Creatinine Ratio, Urine    Collection Time: 11/15/22  2:53 PM   Result Value Ref Range    Urine Protein Level 56.4 mg/dL    Urine Creatinine 40.0 (L) 63.0 - 166.0 mg/dL    Urine Protein/Creatinine Ratio 1.4    Comprehensive Metabolic Panel    Collection Time: 11/16/22  1:31 AM   Result Value Ref Range    Sodium Level 133 (L) 136 - 145 mmol/L    Potassium Level 4.8 3.5 - 5.1 mmol/L    Chloride 100 98 - 107 mmol/L    Carbon Dioxide 21 (L) 23 - 31 mmol/L    Glucose Level 180 (H) 82 - 115 mg/dL    Blood Urea Nitrogen 75.7 (H) 8.4 - 25.7 mg/dL    Creatinine 4.26 (H) 0.73 - 1.18 mg/dL    Calcium Level Total 6.6 (LL) 8.8 - 10.0 mg/dL    Protein Total 4.9 (L) 5.8 - 7.6 gm/dL    Albumin Level 1.6 (L) 3.4 - 4.8 gm/dL    Globulin 3.3 2.4 - 3.5 gm/dL    Albumin/Globulin Ratio 0.5 (L) 1.1 - 2.0 ratio    Bilirubin Total 0.8 <=1.5 mg/dL    Alkaline Phosphatase 65 40 - 150 unit/L    Alanine Aminotransferase 14 0 - 55 unit/L    Aspartate Aminotransferase 74 (H) 5 - 34 unit/L    eGFR 14 mls/min/1.73/m2   CBC with Differential    Collection Time: 11/16/22  1:31 AM   Result Value Ref Range    WBC 20.9 (H) 4.5 - 11.5 x10(3)/mcL    RBC 2.96 (L) 4.70 - 6.10 x10(6)/mcL    Hgb 8.4 (L) 14.0 - 18.0 gm/dL    Hct 25.6 (L) 42.0 - 52.0 %    MCV 86.5 80.0 - 94.0 fL    MCH 28.4 27.0 -  31.0 pg    MCHC 32.8 (L) 33.0 - 36.0 mg/dL    RDW 13.5 11.5 - 17.0 %    Platelet 259 130 - 400 x10(3)/mcL    MPV 9.5 7.4 - 10.4 fL    IG# 0.16 (H) 0 - 0.04 x10(3)/mcL    IG% 0.8 %    NRBC% 0.1 %   Manual Differential    Collection Time: 11/16/22  1:31 AM   Result Value Ref Range    Neut Man 90 %    Lymph Man 6 %    Monocyte Man 4 %    Instr WBC 20.9 x10(3)/mcL    Abs Mono 0.836 0.1 - 1.3 x10(3)/mcL    Abs Lymp 1.254 0.6 - 4.6 x10(3)/mcL    Abs Neut 18.81 (H) 2.1 - 9.2 x10(3)/mcL    RBC Morph Abnormal (A) Normal    Poik 2+ (A) (none)    Microcyte 1+ (A) (none)    Britton Cells 2+ (A) (none)    Platelet Est Normal Normal, Adequate   POCT ARTERIAL BLOOD GAS    Collection Time: 11/16/22  3:38 AM   Result Value Ref Range    POC PH 7.35     POC PCO2 43 mmHg    POC PO2 98 mmHg    POC SATURATED O2 97 %    POC Potassium 4.5 mmol/l    POC Sodium 130 (A) 137 - 145 mmol/l    POC Ionized Calcium 0.89 (A) 1.1 - 1.2 mmol/l    POC HCO3 23.7 mmol/l    Base Deficit -2.0 mmol/l    POC Temp 37.0 C    Specimen source Arterial sample    Blood Culture    Collection Time: 11/16/22  5:10 AM    Specimen: Blood   Result Value Ref Range    GRAM STAIN Gram Positive Cocci, probable Staphylococcus (AA)     GRAM STAIN 1 of 2 Anaerobic bottles positive (AA)    Blood Culture    Collection Time: 11/16/22  5:10 AM    Specimen: Blood   Result Value Ref Range    GRAM STAIN Gram Positive Cocci, probable Staphylococcus (AA)     GRAM STAIN Seen in gram stain of broth only (AA)     GRAM STAIN 2 of 2 bottles positive (AA)    Magnesium    Collection Time: 11/16/22 10:07 AM   Result Value Ref Range    Magnesium Level 2.20 1.60 - 2.60 mg/dL   Phosphorus    Collection Time: 11/16/22 10:07 AM   Result Value Ref Range    Phosphorus Level 5.4 (H) 2.3 - 4.7 mg/dL   Basic Metabolic Panel    Collection Time: 11/16/22 10:07 AM   Result Value Ref Range    Sodium Level 135 (L) 136 - 145 mmol/L    Potassium Level 4.7 3.5 - 5.1 mmol/L    Chloride 101 98 - 107 mmol/L    Carbon  Dioxide 23 23 - 31 mmol/L    Glucose Level 211 (H) 82 - 115 mg/dL    Blood Urea Nitrogen 83.1 (H) 8.4 - 25.7 mg/dL    Creatinine 4.60 (H) 0.73 - 1.18 mg/dL    BUN/Creatinine Ratio 18     Calcium Level Total 6.3 (LL) 8.8 - 10.0 mg/dL    Anion Gap 11.0 mEq/L    eGFR 12 mls/min/1.73/m2   Comprehensive Metabolic Panel    Collection Time: 11/17/22  1:35 AM   Result Value Ref Range    Sodium Level 138 136 - 145 mmol/L    Potassium Level 5.1 3.5 - 5.1 mmol/L    Chloride 103 98 - 107 mmol/L    Carbon Dioxide 20 (L) 23 - 31 mmol/L    Glucose Level 157 (H) 82 - 115 mg/dL    Blood Urea Nitrogen 87.1 (H) 8.4 - 25.7 mg/dL    Creatinine 5.06 (H) 0.73 - 1.18 mg/dL    Calcium Level Total 6.5 (LL) 8.8 - 10.0 mg/dL    Protein Total 5.0 (L) 5.8 - 7.6 gm/dL    Albumin Level 1.4 (L) 3.4 - 4.8 gm/dL    Globulin 3.6 (H) 2.4 - 3.5 gm/dL    Albumin/Globulin Ratio 0.4 (L) 1.1 - 2.0 ratio    Bilirubin Total 1.0 <=1.5 mg/dL    Alkaline Phosphatase 80 40 - 150 unit/L    Alanine Aminotransferase 15 0 - 55 unit/L    Aspartate Aminotransferase 86 (H) 5 - 34 unit/L    eGFR 11 mls/min/1.73/m2   CBC with Differential    Collection Time: 11/17/22  1:35 AM   Result Value Ref Range    WBC 21.5 (H) 4.5 - 11.5 x10(3)/mcL    RBC 2.78 (L) 4.70 - 6.10 x10(6)/mcL    Hgb 7.8 (L) 14.0 - 18.0 gm/dL    Hct 23.6 (L) 42.0 - 52.0 %    MCV 84.9 80.0 - 94.0 fL    MCH 28.1 27.0 - 31.0 pg    MCHC 33.1 33.0 - 36.0 mg/dL    RDW 14.0 11.5 - 17.0 %    Platelet 258 130 - 400 x10(3)/mcL    MPV 9.6 7.4 - 10.4 fL    IG# 0.97 (H) 0 - 0.04 x10(3)/mcL    IG% 4.5 %    NRBC% 0.0 %   Manual Differential    Collection Time: 11/17/22  1:35 AM   Result Value Ref Range    Neut Man 89 %    Lymph Man 8 %    Monocyte Man 3 %    Instr WBC 21.5 x10(3)/mcL    Abs Mono 0.645 0.1 - 1.3 x10(3)/mcL    Abs Lymp 1.72 0.6 - 4.6 x10(3)/mcL    Abs Neut 19.135 (H) 2.1 - 9.2 x10(3)/mcL    RBC Morph Abnormal (A) Normal    Anisocyte 1+ (A) (none)    Poik 2+ (A) (none)    Microcyte 1+ (A) (none)    Target  Cell 1+ (A) (none)    Tigre Cells 2+ (A) (none)    Platelet Est Adequate Normal, Adequate    Giant Platelets 1+        Telemetry:  Sinus Rhythm    Physical Exam  Vitals reviewed.   Constitutional:       Appearance: He is ill-appearing.   HENT:      Head: Normocephalic.      Mouth/Throat:      Mouth: Mucous membranes are moist.      Pharynx: Oropharynx is clear.   Cardiovascular:      Rate and Rhythm: Normal rate and regular rhythm.      Comments: Sinus Rhythm  Pulmonary:      Effort: Pulmonary effort is normal. No respiratory distress.      Breath sounds: Wheezing present.      Comments: OXY MASK  Abdominal:      General: There is no distension.      Palpations: Abdomen is soft.      Tenderness: There is no abdominal tenderness.   Musculoskeletal:      Cervical back: Neck supple.      Right lower leg: No edema.      Left lower leg: No edema.      Comments: Legs are Warm. Chronic Venous Insufficiency Skin Changes noted to bilateral lower extremities.   Skin:     General: Skin is warm and dry.   Neurological:      Mental Status: He is alert.      Comments: Awake/Alert     Current Inpatient Medications:    Current Facility-Administered Medications:     0.9%  NaCl infusion, , Intravenous, Continuous, Misha Thorne DO, Last Rate: 75 mL/hr at 11/16/22 2036, New Bag at 11/16/22 2036    acetaminophen tablet 650 mg, 650 mg, Oral, Q4H PRN, Jama Sarmiento MD, 650 mg at 11/13/22 0316    albuterol inhaler 2 puff, 2 puff, Inhalation, Q4H PRN, Jama Sarmiento MD    amiodarone 360 mg/200 mL (1.8 mg/mL) infusion, 0.5 mg/min, Intravenous, Continuous, Magdy Vanegas DO, Last Rate: 16.7 mL/hr at 11/16/22 0924, 0.5 mg/min at 11/16/22 0924    aspirin EC tablet 81 mg, 81 mg, Oral, Daily, Jama Sarmiento MD, 81 mg at 11/14/22 0859    atorvastatin tablet 40 mg, 40 mg, Oral, Daily, Jama Sarmiento MD, 40 mg at 11/14/22 0859    bisacodyL suppository 10 mg, 10 mg, Rectal, Q12H, JOSE Noland, 10 mg at 11/17/22 0853     dexmedetomidine (PRECEDEX) 400mcg/100mL 0.9% NaCL infusion, 0-1.4 mcg/kg/hr, Intravenous, Continuous, Zheng Guerra MD, Last Rate: 6.8 mL/hr at 11/16/22 1043, 0.4 mcg/kg/hr at 11/16/22 1043    enoxaparin injection 30 mg, 30 mg, Subcutaneous, Q12H, Cheo Garcia MD, 30 mg at 11/17/22 0357    EScitalopram oxalate tablet 5 mg, 5 mg, Oral, Daily, Lore Hernández MD, 5 mg at 11/14/22 0859    HYDROcodone-acetaminophen  mg per tablet 1 tablet, 1 tablet, Oral, Q4H PRN, Jama Sarmiento MD, 1 tablet at 11/13/22 1620    hydrocortisone sodium succinate injection 100 mg, 100 mg, Intravenous, Q8H, Cheo Garcia MD, 100 mg at 11/17/22 0611    lacosamide (VIMPAT) 100 mg in sodium chloride 0.9% 100 mL IVPB, 100 mg, Intravenous, Q12H, Magdy Vanegas DO, Stopped at 11/17/22 0527    lamoTRIgine tablet 200 mg, 200 mg, Oral, BID, Jama Sarmiento MD, 200 mg at 11/15/22 2008    mupirocin 2 % ointment, , Nasal, BID, Moreno Ceja MD, Given at 11/17/22 0853    NORepinephrine 32 mg in dextrose 5 % 250 mL infusion, 0-3 mcg/kg/min, Intravenous, Continuous, Zheng Guerra MD, Last Rate: 4.5 mL/hr at 11/17/22 0853, 0.14 mcg/kg/min at 11/17/22 0853    oxacillin 2 g in sodium chloride 0.9 % 100 mL IVPB (MB+), 2 g, Intravenous, Q4H, JOSE Noland, Stopped at 11/17/22 0821    pantoprazole injection 40 mg, 40 mg, Intravenous, Daily, Magdy Vanegas DO, 40 mg at 11/17/22 0853    Flushing PICC Protocol, , , Until Discontinued **AND** sodium chloride 0.9% flush 10 mL, 10 mL, Intravenous, Q6H, 10 mL at 11/17/22 0612 **AND** sodium chloride 0.9% flush 10 mL, 10 mL, Intravenous, PRN, Zheng Guerra MD    vasopressin (PITRESSIN) 0.2 Units/mL in dextrose 5 % 100 mL infusion, 0.04 Units/min, Intravenous, Continuous, Zheng Guerra MD, Last Rate: 12 mL/hr at 11/17/22 1014, 0.04 Units/min at 11/17/22 1014    zinc oxide-cod liver oil 40 % paste, , Topical (Top), BID, Moreno Ceja MD, Given at 11/17/22 0853    VTE Risk  Mitigation (From admission, onward)           Ordered     enoxaparin injection 30 mg  Every 12 hours (non-standard times)         11/16/22 1022                  Assessment:   NSTEMI- Suspect Type II in the Setting of Septic Shock    - Recent Atypical Chest Pain at Pacer Site (Evaluated on 11.13.22 on Initial Consultation)- No Angina Currently  Septic Shock Requiring Vasopressor Support    - MSSA Bacteremia  CAD (By Calcium Score, Normal Coronaries in 2017)    - EF 55%  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  Hold all Antihypertensive Agents for Now Re: Hypotension Requiring Vasopressor Support  Wean vasopressor support as able for goal MAP 65 or Greater  Will hold off on invasive measures at this time given clinical acuity  Antibiotic Management as per Primary Team  Will be available. Call if needed.     JOSE Chong  Cardiology  [facility] - [facility] North ICU  11/17/2022

## 2022-11-17 NOTE — PROGRESS NOTES
Inpatient Nutrition Assessment    Admit Date: 11/7/2022   Total duration of encounter: 10 days     Nutrition Recommendation/Prescription     If aggressive care to continue, consider NG placement for nutrition.    Start tube feeding when appropriate.    Tube feeding recommendation:   Novasource Renal goal rate 45 ml/hr to provide  1800 kcal/d (96% est needs)  81 g protein/d (119% est needs)  648 ml free water/d  (calculations based on estimated 20 hr/d run time)     Communication of Recommendations: reviewed with patient/caregiver    Nutrition Assessment     Malnutrition Assessment/Nutrition-Focused Physical Exam    Malnutrition in the context of acute illness or injury  Degree of Malnutrition: non-severe (moderate) malnutrition  Energy Intake: unable to obtain  Interpretation of Weight Loss: unable to obtain  Body Fat:mild depletion  Area of Body Fat Loss: orbital region  and upper arm region - triceps / biceps  Muscle Mass Loss: mild depletion  Area of Muscle Mass Loss: temple region - temporalis muscle, clavicle bone region - pectoralis major, deltoid, trapezius muscles, and clavicle and acromion bone region - deltoid muscle  Fluid Accumulation: unable to obtain  Edema: unable to obtain   Reduced  Strength: unable to obtain  A minimum of two characteristics is recommended for diagnosis of either severe or non-severe malnutrition.    Chart Review    Reason Seen: continuous nutrition monitoring and malnutrition screening tool    Diagnosis:  1. Dysphagia with possible aspiration.    2. History of multiple falls with C4 fracture.  3. Near syncope.  4. Chronic obstructive pulmonary disease.  5. Hypertension.  6. Dyslipidemia.  7. 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  8. Protein calorie malnutrition.  9. Deconditioning.  10. BPH    Relevant Medical History: cardiac arrhythmias, GIUSEPPE, stroke, seizure disorder, anemia    Nutrition-Related Medications: NS @ 75ml/hr, levophed @ 0.14mcg/kg/min, vasopressin @  "0.04Units/min, bisacodyl  Calorie Containing IV Medications: no significant kcals from medications at this time    Nutrition-Related Labs:  11/10/22: Bun 11.3, Crea 1.32, GFR 56, Gluc 68  11/17 BUN 87.1, Crea 5.06, Glu 157, Phos 5.4, GFR 11    Diet/PN Order: Diet NPO  Oral Supplement Order: none  Tube Feeding Order: none  Appetite/Oral Intake: NPO/NPO  Factors Affecting Nutritional Intake: none identified  Food/Anabaptism/Cultural Preferences: none reported  Food Allergies: none reported    Skin Integrity: bruised (ecchymotic)  Wound(s):      Altered Skin Integrity left anterior, right anterior, medial coccyx    Comments    11/12/22: EGD biopsy and esophageal dilation completed yesterday. Pt sleeping during rounds, medical staff provided pt oral intake hx stating pt consumed 100% of lunch tray.     11/17/22: Noted malnutrition screening tool indicates unsure wt loss PTA. Unable to verify with pt and EMR indicates wt stable. Per MD notes though, pt with 100# wt loss. Discussed with RN, will provide tube feeding recommendations in case NG placed. Noted possible plans for RRT. May also need to wait until pt more hemodynamically stable prior to starting TF.     Anthropometrics    Height: 5' 5" (165.1 cm)    Last Weight: 68 kg (150 lb) (11/10/22 1737) Weight Method: Bed Scale  BMI (Calculated): 25  BMI Classification: overweight (BMI 25-29.9)        Ideal Body Weight (IBW), Male: 136 lb     % Ideal Body Weight, Male (lb): 110.29 %                          Usual Weight Provided By: unable to obtain usual weight at this time.     Wt Readings from Last 5 Encounters:   11/10/22 68 kg (150 lb)   11/02/22 62.6 kg (138 lb)   10/25/22 64.4 kg (142 lb)   07/28/22 64.4 kg (142 lb)     Weight Change(s) Since Admission:  Admit Weight: 68 kg (150 lb) (11/07/22 1004)  .    Estimated Needs    Weight Used For Calorie Calculations: 68 kg (149 lb 14.6 oz)  Energy Calorie Requirements (kcal): 1872kcal (1.4 stress factor)  Energy Need " Method: [employer]asc[employer] Whitt  Weight Used For Protein Calculations: 68 kg (149 lb 14.6 oz)  Protein Requirements: 68gm (1g/kg)  Fluid Requirements (mL): 1600 mL/day  Temp: 97.5 °F (36.4 °C)       Enteral Nutrition    Patient not receiving enteral nutrition at this time.    Parenteral Nutrition    Patient not receiving parenteral nutrition support at this time.    Evaluation of Received Nutrient Intake    Calories: not meeting estimated needs  Protein: not meeting estimated needs    Patient Education    Not applicable.    Nutrition Diagnosis     PES: Inadequate oral intake related to current condition as evidenced by NPO. (continues)    Interventions/Goals     Intervention(s): modified composition of enteral nutrition, modified rate of enteral nutrition, and collaboration with other providers  Goal: Meet greater than 75% of nutritional needs by follow-up. (goal progressing)    Monitoring & Evaluation     Dietitian will monitor energy intake.  Nutrition Risk/Follow-Up: high (follow-up in 1-4 days)   Please consult if re-assessment needed sooner.

## 2022-11-18 PROBLEM — Z51.5 COMFORT MEASURES ONLY STATUS: Status: ACTIVE | Noted: 2022-01-01

## 2022-11-18 NOTE — NURSING
0830- Dr. Berumen notified that patient has gotten progressively worse overnight, family aware and coming at 0900. He stated he would be available for conversations.     0915- myself and Dr. Berumen updated family of condition and options. They opted for comfort measures, to not increase vasopressors and they will turn off vasopressors at 1300 once other family members come. Asked for preist to come for last rights. Pastoral care notified and last rights performed.    0930- Family asking to give morphine    1010- daughter at bedside, pt began agonally breathing, told her to call rest of family, he may pass soon.    1045- wife, sister, daughter and two other family members at bedside.    1054- pt noted to have stopped breathing, ariadna flat, pacer spikes with no capture noted. Dr. Berumen notified.

## 2022-11-18 NOTE — PROGRESS NOTES
Infectious Disease  Progress Note    Patient Name: Tim Meneses   MRN: [id number]   Admission Date: 11/7/2022   Hospital Length of Stay: 11 days  Attending Physician: Cheo Garcia MD   Primary Care Provider: Primary Doctor No     Isolation Status: No active isolations       Subjective:     Principal Problem: <principal problem not specified>     Interval History:   Feels some abdominal pain today, some tenderness to palpation, otherwise no new complaints, more alert although somewhat more confused    Review of Systems   Review of Systems   All other systems reviewed and are negative.     Objective:     Vital Signs (Most Recent):  Temp: 97.7 °F (36.5 °C) (11/18/22 0000)  Pulse: 62 (11/18/22 0045)  Resp: (!) 22 (11/18/22 0045)  BP: (!) 85/66 (11/18/22 0000)  SpO2: 98 % (11/18/22 0045)    Vital Signs (24h Range):  Temp:  [97.4 °F (36.3 °C)-97.7 °F (36.5 °C)] 97.7 °F (36.5 °C)  Pulse:  [] 62  Resp:  [16-34] 22  SpO2:  [86 %-100 %] 98 %  BP: ()/() 85/66  Arterial Line BP: ()/(35-53) 105/40      Weight:   Wt Readings from Last 1 Encounters:   11/10/22 68 kg (150 lb)      Body mass index is Body mass index is 24.96 kg/m².     Estimated Creatinine Clearance: Estimated Creatinine Clearance: 10.8 mL/min (A) (based on SCr of 5.06 mg/dL (H)).     Lines/Drains/Airways       Peripherally Inserted Central Catheter Line  Duration             PICC Triple Lumen 11/15/22 0018 right basilic 3 days              Drain  Duration                  Urethral Catheter 11/14/22 1745 16 Fr. 3 days              Arterial Line  Duration             Arterial Line 11/14/22 2230 Right Radial 3 days              Peripheral Intravenous Line  Duration                  Peripheral IV - Single Lumen 11/14/22 1745 Anterior;Proximal;Right Forearm 3 days                     Physical Exam  Physical Exam  Constitutional:       Appearance: Normal appearance.   HENT:      Head: Normocephalic and atraumatic.      Mouth/Throat:       Pharynx: No oropharyngeal exudate or posterior oropharyngeal erythema.   Eyes:      Extraocular Movements: Extraocular movements intact.      Pupils: Pupils are equal, round, and reactive to light.   Cardiovascular:      Rate and Rhythm: Normal rate and regular rhythm.      Heart sounds: No murmur heard.  Pulmonary:      Effort: No respiratory distress.      Breath sounds: Rhonchi present. No wheezing or rales.   Abdominal:      General: Bowel sounds are normal. There is no distension.      Palpations: Abdomen is soft.      Tenderness: There is abdominal tenderness.   Musculoskeletal:         General: No swelling or tenderness.      Cervical back: Neck supple. No rigidity or tenderness.   Lymphadenopathy:      Cervical: No cervical adenopathy.   Skin:     Findings: No lesion or rash.   Neurological:      General: No focal deficit present.      Mental Status: He is alert. Mental status is at baseline.      Cranial Nerves: No cranial nerve deficit.      Motor: No weakness.   Psychiatric:         Mood and Affect: Mood normal.         Behavior: Behavior normal.        Significant Labs: CBC:   Recent Labs   Lab 11/17/22  0135   WBC 21.5*   HGB 7.8*   HCT 23.6*        CMP:   Recent Labs   Lab 11/16/22  1007 11/17/22  0135   * 138   K 4.7 5.1   CO2 23 20*   BUN 83.1* 87.1*   CREATININE 4.60* 5.06*   CALCIUM 6.3* 6.5*   ALBUMIN  --  1.4*   BILITOT  --  1.0   ALKPHOS  --  80   AST  --  86*   ALT  --  15       Microbiology Results (last 7 days)       Procedure Component Value Units Date/Time    Blood Culture [[id number]] Collected: 11/17/22 1610    Order Status: Resulted Specimen: Blood from Hand, Left Updated: 11/17/22 1639    Blood Culture [[id number]] Collected: 11/17/22 1617    Order Status: Resulted Specimen: Blood from Arm, Left Updated: 11/17/22 1639    Blood Culture [[id number]]  (Abnormal) Collected: 11/16/22 0510    Order Status: Completed Specimen: Blood Updated: 11/17/22 1148     GRAM STAIN Gram Positive  Cocci, probable Staphylococcus      Seen in gram stain of broth only      2 of 2 bottles positive    Blood Culture [[id number]]  (Abnormal) Collected: 11/16/22 0510    Order Status: Completed Specimen: Blood Updated: 11/17/22 0201     GRAM STAIN Gram Positive Cocci, probable Staphylococcus      1 of 2 Anaerobic bottles positive    Blood Culture [[id number]]  (Abnormal)  (Susceptibility) Collected: 11/14/22 1014    Order Status: Completed Specimen: Blood from Hand, Right Updated: 11/16/22 0640     CULTURE, BLOOD ([facility]) Methicillin Sensitive Staphylococcus aureus     GRAM STAIN Gram Positive Cocci, probable Staphylococcus      Seen in gram stain of broth only      1 of 1 Aerobic bottle positive    Blood Culture [[id number]]  (Abnormal)  (Susceptibility) Collected: 11/14/22 1014    Order Status: Completed Specimen: Blood from Wrist, Right Updated: 11/16/22 0639     CULTURE, BLOOD ([facility]) Methicillin Sensitive Staphylococcus aureus     GRAM STAIN Gram Positive Cocci, probable Staphylococcus      Seen in gram stain of broth only      1 of 1 Aerobic bottle positive    Blood Culture [[id number]]  (Abnormal)  (Susceptibility) Collected: 11/13/22 0037    Order Status: Completed Specimen: Blood, Peripheral Line Updated: 11/15/22 0646     CULTURE, BLOOD ([facility]) Methicillin Sensitive Staphylococcus aureus     GRAM STAIN Gram Positive Cocci, probable Staphylococcus      Seen in gram stain of broth only      2 of 2 bottles positive    Blood Culture [[id number]]  (Abnormal)  (Susceptibility) Collected: 11/13/22 0037    Order Status: Completed Specimen: Blood, Peripheral Line Updated: 11/15/22 0645     CULTURE, BLOOD ([facility]) Methicillin Sensitive Staphylococcus aureus     GRAM STAIN Gram Positive Cocci, probable Staphylococcus      Seen in gram stain of broth only      2 of 2 bottles positive             Significant Imaging: I have reviewed all pertinent imaging results/findings within the past 24 hours.    Assessment/Plan:        He is a  77-year-old male with a past medical history of COPD and pacemaker status who presented with confusion and increased falls for 1 week.  He had an GIUSEPPE o CKD on admit, otherwise was stable.  During course of hospitalization, he underwent esophageal dilation for suspected esophageal stricture.  He then became febrile several days later, and blood cultures are positive for Staphylococcus aureus.  Id has been consulted per [employer] policy for assistance.     MSSA bacteremia, source not clear  Septic shock  GIUSEPPE on CKD  Old c-spine fracture  Vomiting, s/p esophageal dilation - resolved  PPM status  COPD  Constipation  Recent falls     PLAN:  -Mental status somewhat improved however generally condition is not improving, maintaining positive blood cultures, worsening kidney function and poor HD candidate  -Concern about pacemaker being infected, could not be removed given patient's instability and hemodynamics  -PICC line in place, given persistent bacteremia, would be better to remove and place a central line, although this is less likely the cause of the infection as it was placed in   -Needs SHANIQUA when stable  -Continue Oxacillin  -Family considering options for goals of care given patient's critical illness and not improving       ID will continue following. Please contact us with any questions or concerns.    Zak Goldsmith MD  Infectious Disease  [facility]

## 2022-11-18 NOTE — DISCHARGE SUMMARY
Death Note    Patient Name: Tim Meneses  Patient : 1945  Patient MRN: [id number]    Called by nursing staff at 10:50am to see the patient. Upon my arrival, patient was noted to be apneic and unresponsive. ACLS was not initiated due to DNR status. Heart sounds and breath sounds are absent to auscultation. Bilateral carotid, radial, and femoral pulses were absent to palpation. Pupils fixed and dilated, corneal reflex absent bilaterally. Patient was pronounced dead at 10:54am on 2022. Family was present at bedside. Attending was notified.    Time of Death: 10:54am    Cause of Death: Septic Shock    Hospital Course:  This is a 77-year-old male with history of CVA, seizure disorder on lamotrigine and Vimpat, recurrent bradycardia status post pacemaker placement and hepatitis who was upgraded to the ICU after a RRT on the floor for altered mental status and hypotension.  The patient was originally admitted on  status post multiple falls at home with right-sided drift, dizziness and weakness.  Both Neurology and Neurosurgery were consulted on admission.  MRI brain and cervical spine were done.  MRI brain showed no acute changes but the C-spine imaging showed a C4 fracture; NS was consulted recommend rigid collar then transition to a soft Collar for comfort.  Neurology initially started the patient on Sinemet and a complete neuro workup was negative.  Blood cultures were then positive 2/2 bottles for Gram-positive cocci probable staph; final with MSSA; sensitive to oxacillin.  Infectious Disease was consulted.  Upon admit to the ICU he required vasopressors. The patient was made a DNR. Patient was pronounced dead at 10:54am on 22.     Rony Pina,   [employer] IM PGY-3

## 2022-11-18 NOTE — PROGRESS NOTES
"[facility] - [facility] ICU  Pulmonary Critical Care Note    Patient Name: Tim Meneses  MRN: [id number]  Admission Date: 11/7/2022  Hospital Length of Stay: 11 days  Code Status: DNR  Attending Provider: Cheo Garcia MD  Primary Care Provider: Primary Doctor No     Subjective:     HPI:   This is a 77-year-old male with history of CVA, seizure disorder on lamotrigine and Vimpat, recurrent bradycardia status post pacemaker placement and hepatitis who was upgraded to the ICU after a RRT on the floor for altered mental status and hypotension.  The patient was originally admitted on 11/07 status post multiple falls at home with right-sided drift, dizziness and weakness.  Both Neurology and Neurosurgery were consulted on admission.  MRI brain and cervical spine were done.  MRI brain showed no acute changes but the C-spine imaging showed a C4 fracture; NS was consulted recommend rigid collar then transition to a soft Collar for comfort.  Neurology initially started the patient on Sinemet and a complete neuro workup was negative.  Blood cultures were then positive 2/2 bottles for Gram-positive cocci probable staph; final with MSSA; sensitive to oxacillin.  Infectious Disease was consulted/following.  Upon admit to the ICU he has been requiring vasopressors. The patient is a DNR.     Hospital Course/Significant events:  11/15 transferred to ICU after episode of hypotension, started on Levophed and placed on BiPAP  11/17/2022- per renal "RRT seems to be imminent. Will need to determine goals of care with family"    24 Hour Interval History:  Patient remains on BiPAP overnight, FiO2 40%.  Kidney function continues to worsen, decision about RRT has not been made at this time.  Patient has made only approximately 20 mL of urine overnight.  Ventricular arrhythmia has become more pronounced overnight despite amiodarone drip.  More lethargic and obtunded, no longer responding as much as previously.  Increased " vasopressor requirements overnight as well    Past Medical History:   Diagnosis Date    COPD (chronic obstructive pulmonary disease)     Hepatitis     Hypercholesterolemia     Hypertension     Malnutrition related to chronic disease     Seizure     Subacromial impingement of right shoulder     Ventricular tachycardia      Past Surgical History:   Procedure Laterality Date    APPENDECTOMY      CARDIAC PACEMAKER PLACEMENT       Social History     Socioeconomic History    Marital status:    Tobacco Use    Smoking status: Every Day     Packs/day: 1.00     Types: Cigarettes     Start date: 1961    Smokeless tobacco: Never   Substance and Sexual Activity    Alcohol use: Yes     Comment: 1-2 times per year    Drug use: Never    Sexual activity: Not Currently     Current Outpatient Medications   Medication Instructions    albuterol (PROVENTIL/VENTOLIN HFA) 90 mcg/actuation inhaler 2 puffs, Inhalation, Every 4 hours PRN    aspirin (ECOTRIN) 81 mg, Oral, Daily    atorvastatin (LIPITOR) 40 MG tablet 1 tablet, Oral, Daily    carvediloL (COREG) 25 MG tablet 1 tablet, Oral, 2 times daily    cetirizine (ZYRTEC) 10 mg, Oral, Daily    cloNIDine (CATAPRES) 0.1 MG tablet 1 tablet, Oral, Every 8 hours PRN, Take 1 tablet orally every 8hrs as needed for SBP greater than 175 or DBP greater than 100    EScitalopram oxalate (LEXAPRO) 5 MG Tab 1 tablet, Oral, Daily    fluticasone propionate (FLONASE) 50 mcg/actuation nasal spray 1 spray, Each Nostril, Daily    furosemide (LASIX) 40 MG tablet 1 tablet, Oral, Daily    lacosamide (VIMPAT) 100 mg, Oral, Every 12 hours    lamoTRIgine (LAMICTAL) 200 MG tablet 1 po q am; 1.5 po qhs    NIFEdipine (PROCARDIA-XL) 60 mg, Oral, Daily   Current Inpatient Medications   albuterol-ipratropium  3 mL Nebulization Q6H    aspirin  81 mg Oral Daily    atorvastatin  40 mg Oral Daily    bisacodyL  10 mg Rectal Q12H    enoxaparin  30 mg Subcutaneous Q12H    EScitalopram oxalate  5 mg Oral Daily     hydrocortisone sodium succinate  100 mg Intravenous Q8H    lacosamide (VIMPAT) IVPB  100 mg Intravenous Q12H    lamoTRIgine  200 mg Oral BID    mupirocin   Nasal BID    oxacillin IVPB  2 g Intravenous Q4H    pantoprazole  40 mg Intravenous Daily    sodium chloride 0.9%  10 mL Intravenous Q6H    zinc oxide-cod liver oil   Topical (Top) BID   Current Intravenous Infusions   sodium chloride 0.9% 75 mL/hr at 11/16/22 2036    amiodarone in dextrose 5% 0.5 mg/min (11/16/22 0924)    dexmedetomidine (PRECEDEX) infusion 0.4 mcg/kg/hr (11/16/22 1043)    NORepinephrine bitartrate-D5W 0.1 mcg/kg/min (11/17/22 1600)    vasopressin 0.04 Units/min (11/18/22 0511)     Review of Systems   Unable to perform ROS: Critical illness    Objective:       Intake/Output Summary (Last 24 hours) at 11/18/2022 0553  Last data filed at 11/18/2022 0000  Gross per 24 hour   Intake 3587.55 ml   Output 150 ml   Net 3437.55 ml     Vital Signs (Most Recent):  Temp: 97.5 °F (36.4 °C) (11/18/22 0400)  Pulse: 67 (11/18/22 0515)  Resp: (!) 24 (11/18/22 0515)  BP: (!) 109/48 (11/18/22 0500)  SpO2: 99 % (11/18/22 0515)    Body mass index is 24.96 kg/m².  Weight: 68 kg (150 lb) Vital Signs (24h Range):  Temp:  [97.4 °F (36.3 °C)-97.7 °F (36.5 °C)] 97.5 °F (36.4 °C)  Pulse:  [] 67  Resp:  [16-34] 24  SpO2:  [86 %-100 %] 99 %  BP: ()/() 109/48  Arterial Line BP: ()/(35-53) 95/38     Physical Exam  Constitutional:       General: He is not in acute distress.     Appearance: He is ill-appearing.   HENT:      Head: Normocephalic and atraumatic.   Eyes:      Pupils: Pupils are equal, round, and reactive to light.   Cardiovascular:      Rate and Rhythm: Normal rate. Rhythm irregular.      Heart sounds: Murmur heard.   Pulmonary:      Effort: Respiratory distress present.      Breath sounds: Rhonchi present.      Comments: BiPAP in place  Skin:     Capillary Refill: Capillary refill takes 2 to 3 seconds.   Neurological:      Mental Status: He  is lethargic.      Comments: Reflexes intact, will respond to questions with yes or no.  Continues to follow commands.  Not fully oriented at this time.  Slightly worsening neuro status     Lines/Drains/Airways       Peripherally Inserted Central Catheter Line  Duration             PICC Triple Lumen 11/15/22 0018 right basilic 3 days              Drain  Duration                  Urethral Catheter 11/14/22 1745 16 Fr. 3 days              Arterial Line  Duration             Arterial Line 11/14/22 2230 Right Radial 3 days              Peripheral Intravenous Line  Duration                  Peripheral IV - Single Lumen 11/14/22 1745 Anterior;Proximal;Right Forearm 3 days                Significant Labs:  Lab Results   Component Value Date    WBC 25.0 (H) 11/18/2022    HGB 7.4 (L) 11/18/2022    HCT 22.8 (L) 11/18/2022    MCV 85.7 11/18/2022     11/18/2022   BMP  Lab Results   Component Value Date     11/18/2022    K 5.9 (H) 11/18/2022    CO2 16 (L) 11/18/2022    BUN 96.0 (H) 11/18/2022    CREATININE 5.22 (H) 11/18/2022    CALCIUM 6.4 (LL) 11/18/2022   ABG  Recent Labs   Lab 11/16/22  0338   PH 7.35   PO2 98   PCO2 43   HCO3 23.7   Mechanical Ventilation Support:  Oxygen Concentration (%): 40 (11/18/22 0420)  Significant Imaging:  I have reviewed the pertinent imaging within the past 24 hours.  Assessment/Plan:   Assessment  Shock, likely septic  MSSA bacteremia  Acute Respiratory Failure requiring Non re-breather  Right ventricular systolic dysfunction with severe right ventricular enlargement (echocardiogram on 11/14/2022)  Esophageal stricture s/p esophageal dilation   Anxiety  Previous CVA  Seizure Disorder  GIUSEPPE on CKD  Sick-Sinus syndrome s/p pacemaker placement  COPD    Plan  -continue BiPAP for oxygenation support, currently requiring 40% FiO2.    -continued discussions with family about goals of care.  Per renal, patient will need RRT at some point but decision has not been made about this yet.  Kidney  function continuing to worsen, no urine made overnight   -requiring vasopressor support with Levophed and vasopressin, increased requirements overnight.  Continue to wean as tolerated   -blood cultures remain positive, continuing oxacillin at this time.  Patient will eventually need SHANIQUA when stable  -remains on amiodarone infusion, continues to have very frequent ventricular tachycardia, seems to be increasing in frequency   -shifted potassium with insulin this morning, episode of low sugar.  Remains on D10 at this time  -prognosis very guarded at this time     Magdy Vanegas DO  Pulmonary Critical Care Medicine  [facility] - [address] [place] [address]

## 2022-11-18 NOTE — PROGRESS NOTES
NEPHROLOGY PROGRESS NOTE  [facility]     Patient Seen Date: 11/18/2022  Patient Seen Time: 10:16 AM     Reason for consult: GIUSEPPE on ?CKD Stage IV       HPI: 77 year-old male with COPD, HTN, HLD, and apparent chronic kidney disease without clear etiology, presented to the hospital with complaints of dizziness. The pt had multiple falls at home, and an additional fall here during his hospital stay. He was found to have a severely decreased BP and was transferred to the ICU for further care on 11/14/22. Scr on admission (11/7/22) was increased to 2.71, however was found to decreased to 1.32 on 11/10/22, and has since increased to 4.23 today. The patient has decreased urine output. He has developed MSSA bacteremia without clear source. Nephrology service is consulted for GIUSEPPE on ?CKD.     Interval History: Pt seen and examined at bedside in ICU setting. Pt is now obtunded and unable to communicate. Scr worsened, and hyperkalemia has now developed. UOP minimal. Discussed these findings with patient's daughter over phone. She stated that her and family have agreed that the situation is ultimately futile, and that they do not desire heroic measures to care for the patient.     Review of Systems:  Unable to obtain 2/2 pt condition.        Current Facility-Administered Medications:     0.9%  NaCl infusion, , Intravenous, Continuous, Misha Thorne DO, Last Rate: 75 mL/hr at 11/18/22 0712, New Bag at 11/18/22 0712    acetaminophen tablet 650 mg, 650 mg, Oral, Q4H PRN, Jama Sarmiento MD, 650 mg at 11/13/22 0316    albuterol inhaler 2 puff, 2 puff, Inhalation, Q4H PRN, Jama Sarmiento MD    albuterol-ipratropium 2.5 mg-0.5 mg/3 mL nebulizer solution 3 mL, 3 mL, Nebulization, Q6H, JOSE Noland, 3 mL at 11/18/22 0805    amiodarone 360 mg/200 mL (1.8 mg/mL) infusion, 0.5 mg/min, Intravenous, Continuous, Magdy Vanegas DO, Last Rate: 16.7 mL/hr at 11/16/22 0924, 0.5 mg/min at 11/16/22 0924    aspirin EC  tablet 81 mg, 81 mg, Oral, Daily, Jama Sarmiento MD, 81 mg at 11/14/22 0859    atorvastatin tablet 40 mg, 40 mg, Oral, Daily, Jama Sarmiento MD, 40 mg at 11/14/22 0859    bisacodyL suppository 10 mg, 10 mg, Rectal, Q12H, Heidy Moe, FNP, 10 mg at 11/17/22 2031    dexmedetomidine (PRECEDEX) 400mcg/100mL 0.9% NaCL infusion, 0-1.4 mcg/kg/hr, Intravenous, Continuous, Zheng Guerra MD, Last Rate: 6.8 mL/hr at 11/16/22 1043, 0.4 mcg/kg/hr at 11/16/22 1043    enoxaparin injection 30 mg, 30 mg, Subcutaneous, Q12H, Cheo Garcia MD, 30 mg at 11/18/22 0359    EScitalopram oxalate tablet 5 mg, 5 mg, Oral, Daily, Lore Hernández MD, 5 mg at 11/14/22 0859    glycopyrrolate injection 0.1 mg, 0.1 mg, Intravenous, Once, Eric Berumen MD    HYDROcodone-acetaminophen  mg per tablet 1 tablet, 1 tablet, Oral, Q4H PRN, Jama Sarmiento MD, 1 tablet at 11/13/22 1620    hydrocortisone sodium succinate injection 100 mg, 100 mg, Intravenous, Q8H, Cheo Garcia MD, 100 mg at 11/18/22 0510    lacosamide (VIMPAT) 100 mg in sodium chloride 0.9% 100 mL IVPB, 100 mg, Intravenous, Q12H, Magdy Vanegas DO, Stopped at 11/18/22 0855    lamoTRIgine tablet 200 mg, 200 mg, Oral, BID, Jama Sarmiento MD, 200 mg at 11/15/22 2008    morphine injection 2 mg, 2 mg, Intravenous, Q15 Min PRN, Eric Berumen MD    mupirocin 2 % ointment, , Nasal, BID, Moreno Ceja MD, Given at 11/18/22 0819    NORepinephrine 32 mg in dextrose 5 % 250 mL infusion, 0-3 mcg/kg/min, Intravenous, Continuous, Zheng Guerra MD, Last Rate: 8.3 mL/hr at 11/18/22 0600, 0.26 mcg/kg/min at 11/18/22 0600    oxacillin 2 g in sodium chloride 0.9 % 100 mL IVPB (MB+), 2 g, Intravenous, Q4H, Heidy Moe, SHAYYP, Stopped at 11/18/22 0849    pantoprazole injection 40 mg, 40 mg, Intravenous, Daily, Magdy Vanegas DO, 40 mg at 11/18/22 0819    Flushing PICC Protocol, , , Until Discontinued **AND** sodium chloride 0.9% flush 10 mL, 10 mL,  Intravenous, Q6H, 10 mL at 11/18/22 0600 **AND** sodium chloride 0.9% flush 10 mL, 10 mL, Intravenous, PRN, Zheng Guerra MD    vasopressin (PITRESSIN) 0.2 Units/mL in dextrose 5 % 100 mL infusion, 0.04 Units/min, Intravenous, Continuous, Zheng Guerra MD, Last Rate: 12 mL/hr at 11/18/22 0511, 0.04 Units/min at 11/18/22 0511    zinc oxide-cod liver oil 40 % paste, , Topical (Top), BID, Moreno Ceja MD, Given at 11/18/22 0820    Vital Signs (24 h):  Temp:  [97.4 °F (36.3 °C)-97.7 °F (36.5 °C)] 97.5 °F (36.4 °C)  Pulse:  [] 67  Resp:  [16-34] 20  SpO2:  [86 %-100 %] 96 %  BP: ()/() 114/57  Arterial Line BP: ()/(34-53) 97/34   I/O last 3 completed shifts:  In: 4586.7 [I.V.:4386.7; IV Piggyback:200]  Out: 366 [Urine:365; Stool:1]  No intake/output data recorded.        Physical Exam:  General: NAD, ill-appearance   HEENT: + facemask O2  CVS: S1/S2 present and normal. No murmur/rubs/gallop.      RS: decreased bibasilar breath sounds  Abdominal: Soft, NT/ND  Extremities: No edema b/l LE  Skin: No rash, no lesions.  Neurological: obtunded  Dialysis Access: None    Results:    Lab Results   Component Value Date     (L) 11/18/2022    K 5.5 (H) 11/18/2022    CO2 14 (L) 11/18/2022    BUN 94.3 (H) 11/18/2022    CREATININE 5.21 (H) 11/18/2022    CALCIUM 5.9 (LL) 11/18/2022    PHOS 5.4 (H) 11/16/2022    WBC 25.0 (H) 11/18/2022    HGB 7.4 (L) 11/18/2022    HCT 22.8 (L) 11/18/2022     11/18/2022       Assessment and Plan:      GIUSEPPE on ?CKD Stage Unknown (likely 3-4).  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 Pt with known kidney disease in the past, however has not been evaluated by a nephrologist as an outpatient. Upon lab review of Epic, Scr was 1.99 on 11/20/21. Scr on admission (11/7/22) was increased to 2.71, however was found to decreased to 1.32 on 11/10/22, and has since increased to 5.21 today. Serum potassium also  elevated to 5.5. UA with 1+ protein and trace ketones, otherwise with inactive sediment. No renal ultrasound completed during this hospitalization. Pt is now anuric.  - FENa is > 5%, suggesting likely ATN. Spot urine TP/CR is elevated at 1.4, however in non-nephrotic range.  - Continue BP support. Continue IVFs. Monitor for signs of fluid overload.  - Hepatitis and HIV are negative. C3 and C4 are not low. BHARATI and ANCA are negative.  - Discussed care with patient's daughter (see HPI). Unfortunately, the patient has many comorbid conditions and further treatment seems to be futile. Additionally, I do not feel that RRT will be tolerated well in this patient. The family has elected to continue conservative management and do not desire pursuing dialytic therapy.  - Monitor labs. Avoid potential nephrotoxins.    Thank you for your consult. Please feel free to reach me with any questions.  Plan for follow-up tomorrow.    Misha Thorne DO  Interventional Nephrology  [facility]  Cell: [phone]  Office: [phone]

## 2022-11-19 LAB
BACTERIA BLD CULT: ABNORMAL
BACTERIA BLD CULT: ABNORMAL
GRAM STN SPEC: ABNORMAL

## 2022-11-20 LAB
BACTERIA BLD CULT: ABNORMAL
BACTERIA BLD CULT: ABNORMAL
GRAM STN SPEC: ABNORMAL

## 2022-11-28 LAB — GBM AB (OHS): NEGATIVE

## (undated) DEVICE — FORCEP BX LG CAP 2.8MMX240CM

## (undated) DEVICE — SOL IRRI STRL WATER 1000ML

## (undated) DEVICE — BITE BLOCK ADULT LATEX FREE

## (undated) DEVICE — TUBING O2 FEMALE CONN 13FT

## (undated) DEVICE — CONTAINER SPECIMEN SCREW 4OZ

## (undated) DEVICE — KIT CANIST SUCTION 1200CC

## (undated) DEVICE — BAG LABGUARD BIOHAZARD 6X9IN

## (undated) DEVICE — KIT SURGICAL COLON .25 1.1OZ

## (undated) DEVICE — TIP SUCTION YANKAUER

## (undated) DEVICE — COLLECTION SPECIMEN NEPTUNE

## (undated) DEVICE — FORCEP ALLIGATOR 2.8MM W/NDL

## (undated) DEVICE — Device